# Patient Record
Sex: FEMALE | Race: WHITE | NOT HISPANIC OR LATINO | Employment: OTHER | URBAN - METROPOLITAN AREA
[De-identification: names, ages, dates, MRNs, and addresses within clinical notes are randomized per-mention and may not be internally consistent; named-entity substitution may affect disease eponyms.]

---

## 2018-10-11 DIAGNOSIS — Z12.11 SCREENING FOR COLON CANCER: Primary | ICD-10-CM

## 2018-10-23 ENCOUNTER — OUTSIDE FACILITY SERVICE (OUTPATIENT)
Dept: GASTROENTEROLOGY | Facility: CLINIC | Age: 53
End: 2018-10-23

## 2018-10-23 PROCEDURE — G0500 MOD SEDAT ENDO SERVICE >5YRS: HCPCS | Performed by: INTERNAL MEDICINE

## 2018-10-23 PROCEDURE — 43239 EGD BIOPSY SINGLE/MULTIPLE: CPT | Performed by: INTERNAL MEDICINE

## 2018-10-23 PROCEDURE — 45378 DIAGNOSTIC COLONOSCOPY: CPT | Performed by: INTERNAL MEDICINE

## 2021-08-25 ENCOUNTER — OFFICE VISIT (OUTPATIENT)
Dept: FAMILY MEDICINE CLINIC | Age: 56
End: 2021-08-25

## 2021-08-25 ENCOUNTER — LAB (OUTPATIENT)
Dept: LAB | Facility: HOSPITAL | Age: 56
End: 2021-08-25

## 2021-08-25 VITALS
SYSTOLIC BLOOD PRESSURE: 116 MMHG | BODY MASS INDEX: 39.39 KG/M2 | HEIGHT: 65 IN | DIASTOLIC BLOOD PRESSURE: 64 MMHG | WEIGHT: 236.4 LBS | HEART RATE: 84 BPM

## 2021-08-25 DIAGNOSIS — F90.9 ATTENTION DEFICIT HYPERACTIVITY DISORDER (ADHD), UNSPECIFIED ADHD TYPE: ICD-10-CM

## 2021-08-25 DIAGNOSIS — E11.9 TYPE 2 DIABETES MELLITUS WITHOUT COMPLICATION, WITHOUT LONG-TERM CURRENT USE OF INSULIN (HCC): ICD-10-CM

## 2021-08-25 DIAGNOSIS — F51.01 PRIMARY INSOMNIA: ICD-10-CM

## 2021-08-25 DIAGNOSIS — F43.10 PTSD (POST-TRAUMATIC STRESS DISORDER): ICD-10-CM

## 2021-08-25 DIAGNOSIS — G47.33 OSA (OBSTRUCTIVE SLEEP APNEA): Primary | ICD-10-CM

## 2021-08-25 DIAGNOSIS — L03.90 CELLULITIS, UNSPECIFIED CELLULITIS SITE: ICD-10-CM

## 2021-08-25 DIAGNOSIS — I10 ESSENTIAL HYPERTENSION: ICD-10-CM

## 2021-08-25 DIAGNOSIS — E78.5 HYPERLIPIDEMIA, UNSPECIFIED HYPERLIPIDEMIA TYPE: ICD-10-CM

## 2021-08-25 DIAGNOSIS — F41.9 ANXIETY: ICD-10-CM

## 2021-08-25 LAB
ALBUMIN SERPL-MCNC: 4.8 G/DL (ref 3.5–5.2)
ALBUMIN/GLOB SERPL: 1.5 G/DL
ALP SERPL-CCNC: 87 U/L (ref 39–117)
ALT SERPL W P-5'-P-CCNC: 53 U/L (ref 1–33)
ANION GAP SERPL CALCULATED.3IONS-SCNC: 11.6 MMOL/L (ref 5–15)
AST SERPL-CCNC: 48 U/L (ref 1–32)
BASOPHILS # BLD AUTO: 0.12 10*3/MM3 (ref 0–0.2)
BASOPHILS NFR BLD AUTO: 1.2 % (ref 0–1.5)
BILIRUB SERPL-MCNC: <0.2 MG/DL (ref 0–1.2)
BUN SERPL-MCNC: 24 MG/DL (ref 6–20)
BUN/CREAT SERPL: 22.2 (ref 7–25)
CALCIUM SPEC-SCNC: 9.5 MG/DL (ref 8.6–10.5)
CHLORIDE SERPL-SCNC: 98 MMOL/L (ref 98–107)
CHOLEST SERPL-MCNC: 130 MG/DL (ref 0–200)
CO2 SERPL-SCNC: 26.4 MMOL/L (ref 22–29)
CREAT SERPL-MCNC: 1.08 MG/DL (ref 0.57–1)
DEPRECATED RDW RBC AUTO: 41.3 FL (ref 37–54)
EOSINOPHIL # BLD AUTO: 0.24 10*3/MM3 (ref 0–0.4)
EOSINOPHIL NFR BLD AUTO: 2.4 % (ref 0.3–6.2)
ERYTHROCYTE [DISTWIDTH] IN BLOOD BY AUTOMATED COUNT: 14.2 % (ref 12.3–15.4)
GFR SERPL CREATININE-BSD FRML MDRD: 53 ML/MIN/1.73
GLOBULIN UR ELPH-MCNC: 3.2 GM/DL
GLUCOSE SERPL-MCNC: 105 MG/DL (ref 65–99)
HBA1C MFR BLD: 6.2 % (ref 4.8–5.6)
HCT VFR BLD AUTO: 43 % (ref 34–46.6)
HDLC SERPL-MCNC: 37 MG/DL (ref 40–60)
HGB BLD-MCNC: 13.7 G/DL (ref 12–15.9)
IMM GRANULOCYTES # BLD AUTO: 0.11 10*3/MM3 (ref 0–0.05)
IMM GRANULOCYTES NFR BLD AUTO: 1.1 % (ref 0–0.5)
LDLC SERPL CALC-MCNC: 46 MG/DL (ref 0–100)
LDLC/HDLC SERPL: 0.83 {RATIO}
LYMPHOCYTES # BLD AUTO: 2.91 10*3/MM3 (ref 0.7–3.1)
LYMPHOCYTES NFR BLD AUTO: 29.2 % (ref 19.6–45.3)
MCH RBC QN AUTO: 25.9 PG (ref 26.6–33)
MCHC RBC AUTO-ENTMCNC: 31.9 G/DL (ref 31.5–35.7)
MCV RBC AUTO: 81.3 FL (ref 79–97)
MONOCYTES # BLD AUTO: 0.47 10*3/MM3 (ref 0.1–0.9)
MONOCYTES NFR BLD AUTO: 4.7 % (ref 5–12)
NEUTROPHILS NFR BLD AUTO: 6.12 10*3/MM3 (ref 1.7–7)
NEUTROPHILS NFR BLD AUTO: 61.4 % (ref 42.7–76)
NRBC BLD AUTO-RTO: 0 /100 WBC (ref 0–0.2)
PLATELET # BLD AUTO: 322 10*3/MM3 (ref 140–450)
PMV BLD AUTO: 12.5 FL (ref 6–12)
POTASSIUM SERPL-SCNC: 4.4 MMOL/L (ref 3.5–5.2)
PROT SERPL-MCNC: 8 G/DL (ref 6–8.5)
RBC # BLD AUTO: 5.29 10*6/MM3 (ref 3.77–5.28)
SODIUM SERPL-SCNC: 136 MMOL/L (ref 136–145)
TRIGL SERPL-MCNC: 311 MG/DL (ref 0–150)
TSH SERPL DL<=0.05 MIU/L-ACNC: 1.6 UIU/ML (ref 0.27–4.2)
VLDLC SERPL-MCNC: 47 MG/DL (ref 5–40)
WBC # BLD AUTO: 9.97 10*3/MM3 (ref 3.4–10.8)

## 2021-08-25 PROCEDURE — 99204 OFFICE O/P NEW MOD 45 MIN: CPT | Performed by: FAMILY MEDICINE

## 2021-08-25 PROCEDURE — 80061 LIPID PANEL: CPT

## 2021-08-25 PROCEDURE — 84443 ASSAY THYROID STIM HORMONE: CPT

## 2021-08-25 PROCEDURE — 83036 HEMOGLOBIN GLYCOSYLATED A1C: CPT

## 2021-08-25 PROCEDURE — 85025 COMPLETE CBC W/AUTO DIFF WBC: CPT

## 2021-08-25 PROCEDURE — 80053 COMPREHEN METABOLIC PANEL: CPT

## 2021-08-25 PROCEDURE — 36415 COLL VENOUS BLD VENIPUNCTURE: CPT

## 2021-08-25 RX ORDER — LEVOCETIRIZINE DIHYDROCHLORIDE 5 MG/1
5 TABLET, FILM COATED ORAL DAILY
COMMUNITY
Start: 2021-06-01 | End: 2021-09-07 | Stop reason: SDUPTHER

## 2021-08-25 RX ORDER — FENOFIBRATE 134 MG/1
134 CAPSULE ORAL DAILY
COMMUNITY
Start: 2021-06-01 | End: 2021-09-07 | Stop reason: SDUPTHER

## 2021-08-25 RX ORDER — CEPHALEXIN 500 MG/1
CAPSULE ORAL
Status: ON HOLD | COMMUNITY
Start: 2021-08-24 | End: 2021-10-13

## 2021-08-25 RX ORDER — FUROSEMIDE 40 MG/1
40 TABLET ORAL DAILY
COMMUNITY
Start: 2021-05-24 | End: 2021-09-07 | Stop reason: SDUPTHER

## 2021-08-25 RX ORDER — PANTOPRAZOLE SODIUM 40 MG/1
40 TABLET, DELAYED RELEASE ORAL DAILY
COMMUNITY
Start: 2021-05-25 | End: 2021-09-07 | Stop reason: SDUPTHER

## 2021-08-25 RX ORDER — TEMAZEPAM 30 MG/1
1 CAPSULE ORAL NIGHTLY
COMMUNITY
Start: 2021-08-24

## 2021-08-25 RX ORDER — ONDANSETRON 4 MG/1
4 TABLET, FILM COATED ORAL EVERY 8 HOURS PRN
COMMUNITY
Start: 2021-08-24 | End: 2021-10-22

## 2021-08-25 RX ORDER — DEXTROAMPHETAMINE SACCHARATE, AMPHETAMINE ASPARTATE, DEXTROAMPHETAMINE SULFATE AND AMPHETAMINE SULFATE 7.5; 7.5; 7.5; 7.5 MG/1; MG/1; MG/1; MG/1
1 TABLET ORAL 2 TIMES DAILY
COMMUNITY
Start: 2021-08-23

## 2021-08-25 RX ORDER — GABAPENTIN 600 MG/1
1 TABLET ORAL 3 TIMES DAILY
COMMUNITY
Start: 2021-08-23

## 2021-08-25 RX ORDER — POTASSIUM CHLORIDE 750 MG/1
10 CAPSULE, EXTENDED RELEASE ORAL DAILY
COMMUNITY
Start: 2021-05-24 | End: 2021-08-25 | Stop reason: SDUPTHER

## 2021-08-25 RX ORDER — VILAZODONE HYDROCHLORIDE 40 MG/1
1 TABLET ORAL DAILY
COMMUNITY
Start: 2021-08-23 | End: 2022-06-28 | Stop reason: HOSPADM

## 2021-08-25 RX ORDER — SULFAMETHOXAZOLE AND TRIMETHOPRIM 800; 160 MG/1; MG/1
TABLET ORAL 2 TIMES DAILY
Status: ON HOLD | COMMUNITY
Start: 2021-08-24 | End: 2021-10-13

## 2021-08-25 RX ORDER — TRAZODONE HYDROCHLORIDE 50 MG/1
1 TABLET ORAL NIGHTLY PRN
COMMUNITY
Start: 2021-08-23

## 2021-08-25 RX ORDER — CLONAZEPAM 1 MG/1
1 TABLET ORAL 3 TIMES DAILY
COMMUNITY
Start: 2021-08-24

## 2021-08-25 RX ORDER — VALACYCLOVIR HYDROCHLORIDE 1 G/1
TABLET, FILM COATED ORAL
Status: ON HOLD | COMMUNITY
Start: 2021-07-26 | End: 2021-10-13

## 2021-08-25 RX ORDER — POTASSIUM CHLORIDE 750 MG/1
10 CAPSULE, EXTENDED RELEASE ORAL DAILY
Qty: 90 CAPSULE | Refills: 1 | Status: SHIPPED | OUTPATIENT
Start: 2021-08-25 | End: 2022-02-17 | Stop reason: SDUPTHER

## 2021-09-07 RX ORDER — PANTOPRAZOLE SODIUM 40 MG/1
40 TABLET, DELAYED RELEASE ORAL DAILY
Qty: 90 TABLET | Refills: 1 | Status: SHIPPED | OUTPATIENT
Start: 2021-09-07 | End: 2022-02-17 | Stop reason: SDUPTHER

## 2021-09-07 RX ORDER — LEVOCETIRIZINE DIHYDROCHLORIDE 5 MG/1
2.5 TABLET, FILM COATED ORAL DAILY
Qty: 90 TABLET | Refills: 1 | Status: SHIPPED | OUTPATIENT
Start: 2021-09-07 | End: 2022-02-17 | Stop reason: SDUPTHER

## 2021-09-07 RX ORDER — FUROSEMIDE 40 MG/1
40 TABLET ORAL DAILY
Qty: 90 TABLET | Refills: 0 | Status: SHIPPED | OUTPATIENT
Start: 2021-09-07 | End: 2021-12-09

## 2021-09-07 RX ORDER — FENOFIBRATE 134 MG/1
134 CAPSULE ORAL DAILY
Qty: 90 CAPSULE | Refills: 1 | Status: SHIPPED | OUTPATIENT
Start: 2021-09-07 | End: 2022-02-17 | Stop reason: SDUPTHER

## 2021-09-07 NOTE — TELEPHONE ENCOUNTER
Caller: Bc Travisselena Giraldo    Relationship: Self    Best call back number: [phone]     Medication needed:   Requested Prescriptions     Pending Prescriptions Disp Refills   • furosemide (LASIX) 40 MG tablet       Sig: Take 1 tablet by mouth Daily.   • metoprolol tartrate (LOPRESSOR) 25 MG tablet       Sig: Take 1 tablet by mouth 2 (Two) Times a Day.   • pantoprazole (PROTONIX) 40 MG EC tablet       Sig: Take 1 tablet by mouth Daily.   • fenofibrate micronized (LOFIBRA) 134 MG capsule       Sig: Take 1 capsule by mouth Daily.   • levocetirizine (XYZAL) 5 MG tablet       Sig: Take 1 tablet by mouth Daily.       When do you need the refill by: ASAP    What additional details did the patient provide when requesting the medication: PATIENT STATES SHE IS COMPLETELY OUT OF THESE MEDICATIONS    Does the patient have less than a 3 day supply:  [x] Yes  [] No    What is the patient's preferred pharmacy: [id number] - [place], [place] - [address] AT [facility] [address] [facility] - [phone] [facility] [phone] FX

## 2021-09-13 ENCOUNTER — TELEPHONE (OUTPATIENT)
Dept: FAMILY MEDICINE CLINIC | Age: 56
End: 2021-09-13

## 2021-09-13 NOTE — TELEPHONE ENCOUNTER
Caller: Travis Yancey    Relationship: Self    Best call back number: [phone]    What is the best time to reach you: ANYTIME    Who are you requesting to speak with (clinical staff, provider,  specific staff member): NURSE/DR. JOY     What was the call regarding: PATIENT IS WANTING TO MAKE SURE THAT DR. JOY HAS ORDERED A REFERRAL FOR THE SLEEP STUDY THAT SHE NEEDS DONE.     Do you require a callback: YES            Warm/Dry

## 2021-09-21 ENCOUNTER — TELEPHONE (OUTPATIENT)
Dept: FAMILY MEDICINE CLINIC | Age: 56
End: 2021-09-21

## 2021-09-21 NOTE — TELEPHONE ENCOUNTER
HUB TO READ    Pt wants to switch care to dr. Harmon her and her dad. She states that her sister is a pt with him and wants to transfer care from [facility] to [facility].

## 2021-09-21 NOTE — TELEPHONE ENCOUNTER
I would be willing to see the patient.  However, she should know that I do not routinely prescribe medications like clonazepam or temazepam.  If she is receiving those from this practice she would likely be referred to psychiatry to assess their ongoing needed use.  Thanks.

## 2021-09-30 ENCOUNTER — TELEPHONE (OUTPATIENT)
Dept: FAMILY MEDICINE CLINIC | Age: 56
End: 2021-09-30

## 2021-09-30 NOTE — TELEPHONE ENCOUNTER
Caller: Travis Yancey    Relationship to patient: Self    Best call back number: [phone] (H)    Patient is needing: PATIENT RETURNING OFFICE PHONE CALL

## 2021-10-05 ENCOUNTER — OFFICE VISIT (OUTPATIENT)
Dept: FAMILY MEDICINE CLINIC | Age: 56
End: 2021-10-05

## 2021-10-05 VITALS — DIASTOLIC BLOOD PRESSURE: 84 MMHG | OXYGEN SATURATION: 98 % | SYSTOLIC BLOOD PRESSURE: 126 MMHG | HEART RATE: 122 BPM

## 2021-10-05 DIAGNOSIS — R07.9 CHEST PAIN, UNSPECIFIED TYPE: Primary | ICD-10-CM

## 2021-10-05 PROCEDURE — 99214 OFFICE O/P EST MOD 30 MIN: CPT | Performed by: FAMILY MEDICINE

## 2021-10-05 NOTE — PROGRESS NOTES
Travis Yancey presents to [facility].    Chief Complaint:  Chest pain    Subjective       History of Present Illness:  Travis presented to the office with acute chest pain.  She says that she has been having some shortness of breath at night for the last bit.  She was scheduled for Medicare wellness exam, and I have not seen the patient previously.  She does give some history of cardiac disease indicating that she has narrow coronary arteries.  At any rate, on presentation to the office, she said she was having chest pain with radiating pain to the neck and left arm.  She was short of breath.  She described the pain as being a pressure like someone was sitting on her chest.      Review of Systems:  Review of Systems   Constitutional: Negative for chills and fever.   HENT: Negative for congestion and sore throat.    Respiratory: Positive for chest tightness and shortness of breath. Negative for cough.    Cardiovascular: Positive for chest pain. Negative for palpitations.   Gastrointestinal: Negative for abdominal pain, nausea and vomiting.   Musculoskeletal: Positive for neck pain.        Objective   Medical History:  Past Medical History:   • ADHD (attention deficit hyperactivity disorder)   • Anxiety   • Depression   • Diabetes mellitus (CMS/HCC)   • Hypertension     Past Surgical History:   • APPENDECTOMY   • CERVICAL FUSION    c5-6   •  SECTION   • CHOLECYSTECTOMY   • DISCECTOMY POSTERIOR THORACIC TRANSPEDICULAR APPROACH   • PARTIAL HIP ARTHROPLASTY      No family history on file.  Social History     Tobacco Use   • Smoking status: Former Smoker     Types: Cigarettes     Quit date: 2019     Years since quittin.1   • Smokeless tobacco: Never Used   • Tobacco comment: Intermittent from age 22 on-- 2-3 pack/ week   Substance Use Topics   • Alcohol use: Not Currently       Health Maintenance Due   Topic Date Due   • URINE MICROALBUMIN  Never done   • Pneumococcal  Vaccine 0-64 (1 of 2 - PPSV23) Never done   • Hepatitis B (1 of 3 - Risk 3-dose series) Never done   • TDAP/TD VACCINES (1 - Tdap) Never done   • ZOSTER VACCINE (1 of 2) Never done   • HEPATITIS C SCREENING  Never done   • ANNUAL WELLNESS VISIT  Never done   • DIABETIC FOOT EXAM  Never done   • PAP SMEAR  Never done   • DIABETIC EYE EXAM  Never done   • MAMMOGRAM  04/09/2020   • INFLUENZA VACCINE  09/01/2021        Immunization History   Administered Date(s) Administered   • COVID-19 (OOTU) 06/15/2021   • Influenza, Unspecified 11/18/2020       No Known Allergies     Medications:  Current Outpatient Medications on File Prior to Visit   Medication Sig   • amphetamine-dextroamphetamine (ADDERALL) 30 MG tablet Take 1 tablet by mouth 2 (two) times a day.   • cephalexin (KEFLEX) 500 MG capsule    • clonazePAM (KlonoPIN) 1 MG tablet Take 1 tablet by mouth 3 (Three) Times a Day.   • fenofibrate micronized (LOFIBRA) 134 MG capsule Take 1 capsule by mouth Daily.   • furosemide (LASIX) 40 MG tablet Take 1 tablet by mouth Daily.   • gabapentin (NEURONTIN) 600 MG tablet Take 1 tablet by mouth 3 (Three) Times a Day.   • levocetirizine (XYZAL) 5 MG tablet Take 0.5 tablets by mouth Daily.   • metFORMIN (GLUCOPHAGE) 500 MG tablet Take 2 tablets by mouth 2 (two) times a day.   • metoprolol tartrate (LOPRESSOR) 25 MG tablet Take 1 tablet by mouth 2 (Two) Times a Day.   • ondansetron (ZOFRAN) 4 MG tablet    • pantoprazole (PROTONIX) 40 MG EC tablet Take 1 tablet by mouth Daily.   • potassium chloride (MICRO-K) 10 MEQ CR capsule Take 1 capsule by mouth Daily.   • sulfamethoxazole-trimethoprim (BACTRIM DS,SEPTRA DS) 800-160 MG per tablet 2 (two) times a day.   • temazepam (RESTORIL) 30 MG capsule Take 1 capsule by mouth Every Night.   • traZODone (DESYREL) 50 MG tablet Take 1 tablet by mouth At Night As Needed for Sleep.   • valACYclovir (VALTREX) 1000 MG tablet TAKE 1 TABLET BY MOUTH THREE TIMES DAILY FOR 5 DAYS   • Viibryd 40 MG  tablet tablet Take 1 tablet by mouth Daily.     No current facility-administered medications on file prior to visit.       Vital Signs:   /84 (BP Location: Right arm, Patient Position: Sitting)   Pulse (!) 122   SpO2 98% Comment: RA      Physical Exam:  Physical Exam  Constitutional:       Appearance: She is obese. She is not toxic-appearing.   HENT:      Head: Normocephalic and atraumatic.   Eyes:      Extraocular Movements: Extraocular movements intact.      Pupils: Pupils are equal, round, and reactive to light.   Cardiovascular:      Rate and Rhythm: Regular rhythm. Tachycardia present.      Heart sounds: No murmur heard.     Pulmonary:      Effort: Pulmonary effort is normal. No respiratory distress.      Breath sounds: Normal breath sounds.   Abdominal:      Palpations: There is no mass.   Musculoskeletal:      Cervical back: Neck supple.   Lymphadenopathy:      Cervical: No cervical adenopathy.   Psychiatric:      Comments: anxious         Result Review      The following data was reviewed by Jeferson Harmon MD on 10/05/2021.  Lab Results   Component Value Date    WBC 9.97 08/25/2021    HGB 13.7 08/25/2021    HCT 43.0 08/25/2021    MCV 81.3 08/25/2021     08/25/2021     Lab Results   Component Value Date    GLUCOSE 105 (H) 08/25/2021    BUN 24 (H) 08/25/2021    CREATININE 1.08 (H) 08/25/2021    EGFRIFNONA 53 (L) 08/25/2021    BCR 22.2 08/25/2021    K 4.4 08/25/2021    CO2 26.4 08/25/2021    CALCIUM 9.5 08/25/2021    ALBUMIN 4.80 08/25/2021    AST 48 (H) 08/25/2021    ALT 53 (H) 08/25/2021     Lab Results   Component Value Date    CHOL 130 08/25/2021    TRIG 311 (H) 08/25/2021    HDL 37 (L) 08/25/2021    LDL 46 08/25/2021     Lab Results   Component Value Date    TSH 1.600 08/25/2021     Lab Results   Component Value Date    HGBA1C 6.20 (H) 08/25/2021            Assessment and Plan:   As above, Travis presented with acute complaint.  We did not move forward with Medicare wellness exam.   After assessing her briefly, we contacted EMS to come.  She was given supplemental oxygen at 2 L/min and chewed 324 mg of aspirin.  It was my intention to have her moved to the examination bed, but she became lightheaded upon standing.  Therefore, we did not give aspirin.  She left the office awake and alert and without obvious distress.  History was given to EMS, and I spoke to the staff at [facility].  Nursing also informed Travis's father of the transfer to the emergency room.       There are no diagnoses linked to this encounter.      Follow Up   No follow-ups on file.  Patient was given instructions and counseling regarding her condition or for health maintenance advice. Please see specific information pulled into the AVS if appropriate.

## 2021-10-06 ENCOUNTER — READMISSION MANAGEMENT (OUTPATIENT)
Dept: CALL CENTER | Facility: HOSPITAL | Age: 56
End: 2021-10-06

## 2021-10-06 ENCOUNTER — TELEPHONE (OUTPATIENT)
Dept: FAMILY MEDICINE CLINIC | Age: 56
End: 2021-10-06

## 2021-10-06 NOTE — TELEPHONE ENCOUNTER
Caller: Travis Yancey    Relationship to patient: Self    Best call back number: [phone]    Chief complaint: CHEST PAIN    Type of visit: HOSPITAL FOLLOW UP    Requested date: WITHIN 7 DAYS    Additional notes: PATIENT IS GETTING DISCHARGED FROM [facility] 10/6/21 FOR CHEST PAIN. THERE IS NO AVAILABILITY WITH PCP WITHIN 7 DAYS PER HUB PROTOCOL. ATTEMPTED TO WARM TRANSFER. PLEASE CALL PATIENT TO SCHEDULE.

## 2021-10-07 ENCOUNTER — TRANSITIONAL CARE MANAGEMENT TELEPHONE ENCOUNTER (OUTPATIENT)
Dept: CALL CENTER | Facility: HOSPITAL | Age: 56
End: 2021-10-07

## 2021-10-07 NOTE — OUTREACH NOTE
Prep Survey      Responses   [facility] facility patient discharged from?  Non-BH   Is LACE score < 7 ?  Non-BH Discharge   Emergency Room discharge w/ pulse ox?  No   Eligibility  [facility]   Date of Admission  10/05/21   Date of Discharge  10/06/21   Discharge diagnosis  Acute Chest Pain, EMS called from PCP office   Does the patient have one of the following disease processes/diagnoses(primary or secondary)?  Non-BH Discharge   Prep survey completed?  Yes          Siomara Stewart RN

## 2021-10-07 NOTE — OUTREACH NOTE
Call Center TCM Note      Responses   [facility] patient discharged from?  Non-   Does the patient have one of the following disease processes/diagnoses(primary or secondary)?  Non- Discharge   TCM attempt successful?  Yes   Call start time  1157   Call end time  1203   Discharge diagnosis  Acute Chest Pain, EMS called from PCP office   Does the patient have all medications ordered at discharge?  Yes   Is the patient taking all medications as directed (includes completed medication regime)?  Yes   Does the patient have a primary care provider?   Yes   Does the patient have an appointment with their PCP within 7 days of discharge?  Greater than 7 days   Comments regarding PCP  appt with Dr. Harmon on 10/15   Nursing Interventions  Verified appointment date/time/provider   Has the patient kept scheduled appointments due by today?  N/A   Psychosocial issues?  No   Did the patient receive a copy of their discharge instructions?  Yes   What is the patient's perception of their health status since discharge?  Same   Is the patient/caregiver able to teach back the hierarchy of who to call/visit for symptoms/problems? PCP, Specialist, Home health nurse, Urgent Care, ED, 911  Yes   TCM call completed?  Yes   Wrap up additional comments  Says she is still having the chest pains, advised her if pains get worse to seek care at a different hospital because she states she will not go back to [facility], confirmed appt with Dr. Harmon for 10/15.          Magali Andujar RN    10/7/2021, 12:03 EDT

## 2021-10-13 ENCOUNTER — APPOINTMENT (OUTPATIENT)
Dept: GENERAL RADIOLOGY | Facility: HOSPITAL | Age: 56
End: 2021-10-13

## 2021-10-13 ENCOUNTER — APPOINTMENT (OUTPATIENT)
Dept: CARDIOLOGY | Facility: HOSPITAL | Age: 56
End: 2021-10-13

## 2021-10-13 ENCOUNTER — HOSPITAL ENCOUNTER (OUTPATIENT)
Facility: HOSPITAL | Age: 56
LOS: 1 days | Discharge: HOME | End: 2021-10-17
Attending: EMERGENCY MEDICINE | Admitting: STUDENT IN AN ORGANIZED HEALTH CARE EDUCATION/TRAINING PROGRAM

## 2021-10-13 DIAGNOSIS — R07.2 PRECORDIAL CHEST PAIN: Primary | ICD-10-CM

## 2021-10-13 DIAGNOSIS — E66.2 OBESITY HYPOVENTILATION SYNDROME (HCC): ICD-10-CM

## 2021-10-13 DIAGNOSIS — R09.02 HYPOXIA: ICD-10-CM

## 2021-10-13 DIAGNOSIS — R06.09 DYSPNEA ON EXERTION: ICD-10-CM

## 2021-10-13 DIAGNOSIS — J96.01 ACUTE RESPIRATORY FAILURE WITH HYPOXIA (HCC): ICD-10-CM

## 2021-10-13 DIAGNOSIS — M75.42 SHOULDER IMPINGEMENT SYNDROME, LEFT: ICD-10-CM

## 2021-10-13 DIAGNOSIS — G47.33 OSA (OBSTRUCTIVE SLEEP APNEA): ICD-10-CM

## 2021-10-13 PROBLEM — J42 CHRONIC BRONCHITIS: Status: ACTIVE | Noted: 2021-10-13

## 2021-10-13 PROBLEM — K21.9 ESOPHAGEAL REFLUX: Status: ACTIVE | Noted: 2021-10-13

## 2021-10-13 PROBLEM — I95.1 ORTHOSTATIC HYPOTENSION: Status: ACTIVE | Noted: 2017-01-24

## 2021-10-13 PROBLEM — E78.5 HYPERLIPIDEMIA: Status: ACTIVE | Noted: 2020-08-05

## 2021-10-13 PROBLEM — M79.7 FIBROMYALGIA: Status: ACTIVE | Noted: 2021-10-13

## 2021-10-13 PROBLEM — R15.9 BOWEL AND BLADDER INCONTINENCE: Status: ACTIVE | Noted: 2017-01-24

## 2021-10-13 PROBLEM — G43.909 MIGRAINE: Status: ACTIVE | Noted: 2021-10-13

## 2021-10-13 PROBLEM — F32.A DEPRESSION: Status: ACTIVE | Noted: 2021-10-13

## 2021-10-13 PROBLEM — R32 BOWEL AND BLADDER INCONTINENCE: Status: ACTIVE | Noted: 2017-01-24

## 2021-10-13 PROBLEM — D64.9 ANEMIA: Status: ACTIVE | Noted: 2021-10-13

## 2021-10-13 PROBLEM — F31.9 BIPOLAR DISORDER: Status: ACTIVE | Noted: 2021-10-13

## 2021-10-13 PROBLEM — R07.9 CHEST PAIN: Status: ACTIVE | Noted: 2021-10-13

## 2021-10-13 LAB
ALBUMIN SERPL-MCNC: 4.7 G/DL (ref 3.5–5.2)
ALBUMIN/GLOB SERPL: 1.6 G/DL
ALP SERPL-CCNC: 61 U/L (ref 39–117)
ALT SERPL W P-5'-P-CCNC: 44 U/L (ref 1–33)
ANION GAP SERPL CALCULATED.3IONS-SCNC: 12.5 MMOL/L (ref 5–15)
AST SERPL-CCNC: 47 U/L (ref 1–32)
BASOPHILS # BLD AUTO: 0.11 10*3/MM3 (ref 0–0.2)
BASOPHILS NFR BLD AUTO: 1.2 % (ref 0–1.5)
BH CV ECHO MEAS - ACS: 2.1 CM
BH CV ECHO MEAS - AO MAX PG (FULL): 2.7 MMHG
BH CV ECHO MEAS - AO MAX PG: 8 MMHG
BH CV ECHO MEAS - AO MEAN PG (FULL): 2 MMHG
BH CV ECHO MEAS - AO MEAN PG: 5 MMHG
BH CV ECHO MEAS - AO ROOT AREA (BSA CORRECTED): 1.2
BH CV ECHO MEAS - AO ROOT AREA: 4.9 CM^2
BH CV ECHO MEAS - AO ROOT DIAM: 2.5 CM
BH CV ECHO MEAS - AO V2 MAX: 141 CM/SEC
BH CV ECHO MEAS - AO V2 MEAN: 100 CM/SEC
BH CV ECHO MEAS - AO V2 VTI: 21 CM
BH CV ECHO MEAS - AVA(I,A): 2.7 CM^2
BH CV ECHO MEAS - AVA(I,D): 2.7 CM^2
BH CV ECHO MEAS - AVA(V,A): 2.3 CM^2
BH CV ECHO MEAS - AVA(V,D): 2.3 CM^2
BH CV ECHO MEAS - BSA(HAYCOCK): 2.3 M^2
BH CV ECHO MEAS - BSA: 2.1 M^2
BH CV ECHO MEAS - BZI_BMI: 39.9 KILOGRAMS/M^2
BH CV ECHO MEAS - BZI_METRIC_HEIGHT: 165.1 CM
BH CV ECHO MEAS - BZI_METRIC_WEIGHT: 108.9 KG
BH CV ECHO MEAS - EDV(CUBED): 64 ML
BH CV ECHO MEAS - EDV(MOD-SP2): 44 ML
BH CV ECHO MEAS - EDV(MOD-SP4): 43 ML
BH CV ECHO MEAS - EDV(TEICH): 70 ML
BH CV ECHO MEAS - EF(CUBED): 57.8 %
BH CV ECHO MEAS - EF(MOD-BP): 60.2 %
BH CV ECHO MEAS - EF(MOD-SP2): 61.4 %
BH CV ECHO MEAS - EF(MOD-SP4): 53.5 %
BH CV ECHO MEAS - EF(TEICH): 50 %
BH CV ECHO MEAS - ESV(CUBED): 27 ML
BH CV ECHO MEAS - ESV(MOD-SP2): 17 ML
BH CV ECHO MEAS - ESV(MOD-SP4): 20 ML
BH CV ECHO MEAS - ESV(TEICH): 35 ML
BH CV ECHO MEAS - FS: 25 %
BH CV ECHO MEAS - IVS/LVPW: 1.2
BH CV ECHO MEAS - IVSD: 1.3 CM
BH CV ECHO MEAS - LA DIMENSION: 3 CM
BH CV ECHO MEAS - LA/AO: 1.2
BH CV ECHO MEAS - LV DIASTOLIC VOL/BSA (35-75): 20.1 ML/M^2
BH CV ECHO MEAS - LV MASS(C)D: 165.5 GRAMS
BH CV ECHO MEAS - LV MASS(C)DI: 77.4 GRAMS/M^2
BH CV ECHO MEAS - LV MAX PG: 5.3 MMHG
BH CV ECHO MEAS - LV MEAN PG: 3 MMHG
BH CV ECHO MEAS - LV SYSTOLIC VOL/BSA (12-30): 9.4 ML/M^2
BH CV ECHO MEAS - LV V1 MAX: 115 CM/SEC
BH CV ECHO MEAS - LV V1 MEAN: 83.8 CM/SEC
BH CV ECHO MEAS - LV V1 VTI: 19.7 CM
BH CV ECHO MEAS - LVIDD: 4 CM
BH CV ECHO MEAS - LVIDS: 3 CM
BH CV ECHO MEAS - LVLD AP2: 6.9 CM
BH CV ECHO MEAS - LVLD AP4: 7.7 CM
BH CV ECHO MEAS - LVLS AP2: 6.3 CM
BH CV ECHO MEAS - LVLS AP4: 6.3 CM
BH CV ECHO MEAS - LVOT AREA (M): 2.8 CM^2
BH CV ECHO MEAS - LVOT AREA: 2.8 CM^2
BH CV ECHO MEAS - LVOT DIAM: 1.9 CM
BH CV ECHO MEAS - LVPWD: 1.1 CM
BH CV ECHO MEAS - MED PEAK E' VEL: 8.2 CM/SEC
BH CV ECHO MEAS - MV A MAX VEL: 114 CM/SEC
BH CV ECHO MEAS - MV DEC SLOPE: 670 CM/SEC^2
BH CV ECHO MEAS - MV DEC TIME: 0.19 SEC
BH CV ECHO MEAS - MV E MAX VEL: 66.8 CM/SEC
BH CV ECHO MEAS - MV E/A: 0.59
BH CV ECHO MEAS - MV MAX PG: 5.1 MMHG
BH CV ECHO MEAS - MV MEAN PG: 2 MMHG
BH CV ECHO MEAS - MV P1/2T MAX VEL: 111 CM/SEC
BH CV ECHO MEAS - MV P1/2T: 48.5 MSEC
BH CV ECHO MEAS - MV V2 MAX: 113 CM/SEC
BH CV ECHO MEAS - MV V2 MEAN: 69.8 CM/SEC
BH CV ECHO MEAS - MV V2 VTI: 20.9 CM
BH CV ECHO MEAS - MVA P1/2T LCG: 2 CM^2
BH CV ECHO MEAS - MVA(P1/2T): 4.5 CM^2
BH CV ECHO MEAS - MVA(VTI): 2.7 CM^2
BH CV ECHO MEAS - PA ACC TIME: 0.12 SEC
BH CV ECHO MEAS - PA MAX PG (FULL): 1.6 MMHG
BH CV ECHO MEAS - PA MAX PG: 4.2 MMHG
BH CV ECHO MEAS - PA PR(ACCEL): 23.2 MMHG
BH CV ECHO MEAS - PA V2 MAX: 103 CM/SEC
BH CV ECHO MEAS - PVA(V,A): 4.9 CM^2
BH CV ECHO MEAS - PVA(V,D): 4.9 CM^2
BH CV ECHO MEAS - QP/QS: 1.6
BH CV ECHO MEAS - RV MAX PG: 2.7 MMHG
BH CV ECHO MEAS - RV MEAN PG: 1 MMHG
BH CV ECHO MEAS - RV V1 MAX: 81.6 CM/SEC
BH CV ECHO MEAS - RV V1 MEAN: 51.9 CM/SEC
BH CV ECHO MEAS - RV V1 VTI: 14.5 CM
BH CV ECHO MEAS - RVOT AREA: 6.2 CM^2
BH CV ECHO MEAS - RVOT DIAM: 2.8 CM
BH CV ECHO MEAS - SI(AO): 48.2 ML/M^2
BH CV ECHO MEAS - SI(CUBED): 17.3 ML/M^2
BH CV ECHO MEAS - SI(LVOT): 26.1 ML/M^2
BH CV ECHO MEAS - SI(MOD-SP2): 12.6 ML/M^2
BH CV ECHO MEAS - SI(MOD-SP4): 10.8 ML/M^2
BH CV ECHO MEAS - SI(TEICH): 16.4 ML/M^2
BH CV ECHO MEAS - SV(AO): 103.1 ML
BH CV ECHO MEAS - SV(CUBED): 37 ML
BH CV ECHO MEAS - SV(LVOT): 55.9 ML
BH CV ECHO MEAS - SV(MOD-SP2): 27 ML
BH CV ECHO MEAS - SV(MOD-SP4): 23 ML
BH CV ECHO MEAS - SV(RVOT): 89.3 ML
BH CV ECHO MEAS - SV(TEICH): 35 ML
BILIRUB SERPL-MCNC: 0.2 MG/DL (ref 0–1.2)
BUN SERPL-MCNC: 26 MG/DL (ref 6–20)
BUN/CREAT SERPL: 22 (ref 7–25)
CALCIUM SPEC-SCNC: 9.5 MG/DL (ref 8.6–10.5)
CHLORIDE SERPL-SCNC: 102 MMOL/L (ref 98–107)
CO2 SERPL-SCNC: 25.5 MMOL/L (ref 22–29)
CREAT SERPL-MCNC: 1.18 MG/DL (ref 0.57–1)
CRP SERPL-MCNC: 1.16 MG/DL (ref 0–0.5)
D DIMER PPP FEU-MCNC: <0.27 MCGFEU/ML (ref 0–0.49)
DEPRECATED RDW RBC AUTO: 47.1 FL (ref 37–54)
EOSINOPHIL # BLD AUTO: 0.21 10*3/MM3 (ref 0–0.4)
EOSINOPHIL NFR BLD AUTO: 2.2 % (ref 0.3–6.2)
ERYTHROCYTE [DISTWIDTH] IN BLOOD BY AUTOMATED COUNT: 15.4 % (ref 12.3–15.4)
ERYTHROCYTE [SEDIMENTATION RATE] IN BLOOD: 35 MM/HR (ref 0–30)
GFR SERPL CREATININE-BSD FRML MDRD: 48 ML/MIN/1.73
GLOBULIN UR ELPH-MCNC: 2.9 GM/DL
GLUCOSE BLDC GLUCOMTR-MCNC: 103 MG/DL (ref 70–130)
GLUCOSE BLDC GLUCOMTR-MCNC: 112 MG/DL (ref 70–130)
GLUCOSE SERPL-MCNC: 119 MG/DL (ref 65–99)
HCT VFR BLD AUTO: 37.1 % (ref 34–46.6)
HCT VFR BLDA CALC: 35 % (ref 38–51)
HCT VFR BLDA CALC: 36 % (ref 38–51)
HGB BLD-MCNC: 11.9 G/DL (ref 12–15.9)
HGB BLDA-MCNC: 11.9 G/DL (ref 12–17)
HGB BLDA-MCNC: 12.2 G/DL (ref 12–17)
IMM GRANULOCYTES # BLD AUTO: 0.14 10*3/MM3 (ref 0–0.05)
IMM GRANULOCYTES NFR BLD AUTO: 1.5 % (ref 0–0.5)
LV EF 2D ECHO EST: 60 %
LYMPHOCYTES # BLD AUTO: 2.33 10*3/MM3 (ref 0.7–3.1)
LYMPHOCYTES NFR BLD AUTO: 24.7 % (ref 19.6–45.3)
MCH RBC QN AUTO: 26.6 PG (ref 26.6–33)
MCHC RBC AUTO-ENTMCNC: 32.1 G/DL (ref 31.5–35.7)
MCV RBC AUTO: 82.8 FL (ref 79–97)
MONOCYTES # BLD AUTO: 0.53 10*3/MM3 (ref 0.1–0.9)
MONOCYTES NFR BLD AUTO: 5.6 % (ref 5–12)
NEUTROPHILS NFR BLD AUTO: 6.12 10*3/MM3 (ref 1.7–7)
NEUTROPHILS NFR BLD AUTO: 64.8 % (ref 42.7–76)
NRBC BLD AUTO-RTO: 0 /100 WBC (ref 0–0.2)
NT-PROBNP SERPL-MCNC: 32.4 PG/ML (ref 0–900)
PLATELET # BLD AUTO: 239 10*3/MM3 (ref 140–450)
PMV BLD AUTO: 12.2 FL (ref 6–12)
POTASSIUM SERPL-SCNC: 4.3 MMOL/L (ref 3.5–5.2)
PROT SERPL-MCNC: 7.6 G/DL (ref 6–8.5)
QT INTERVAL: 333 MS
QT INTERVAL: 356 MS
RBC # BLD AUTO: 4.48 10*6/MM3 (ref 3.77–5.28)
SAO2 % BLDA: 67 % (ref 95–98)
SAO2 % BLDA: 97 % (ref 95–98)
SARS-COV-2 RNA RESP QL NAA+PROBE: NOT DETECTED
SINUS: 2.5 CM
SODIUM SERPL-SCNC: 140 MMOL/L (ref 136–145)
STJ: 2.6 CM
TROPONIN T SERPL-MCNC: <0.01 NG/ML (ref 0–0.03)
WBC # BLD AUTO: 9.44 10*3/MM3 (ref 3.4–10.8)

## 2021-10-13 PROCEDURE — 93005 ELECTROCARDIOGRAM TRACING: CPT | Performed by: NURSE PRACTITIONER

## 2021-10-13 PROCEDURE — 25010000002 HEPARIN (PORCINE) PER 1000 UNITS: Performed by: INTERNAL MEDICINE

## 2021-10-13 PROCEDURE — 93005 ELECTROCARDIOGRAM TRACING: CPT

## 2021-10-13 PROCEDURE — 25010000002 PERFLUTREN (DEFINITY) 8.476 MG IN SODIUM CHLORIDE (PF) 0.9 % 10 ML INJECTION: Performed by: NURSE PRACTITIONER

## 2021-10-13 PROCEDURE — 99284 EMERGENCY DEPT VISIT MOD MDM: CPT

## 2021-10-13 PROCEDURE — 85025 COMPLETE CBC W/AUTO DIFF WBC: CPT | Performed by: EMERGENCY MEDICINE

## 2021-10-13 PROCEDURE — 94660 CPAP INITIATION&MGMT: CPT

## 2021-10-13 PROCEDURE — 99204 OFFICE O/P NEW MOD 45 MIN: CPT | Performed by: INTERNAL MEDICINE

## 2021-10-13 PROCEDURE — 96374 THER/PROPH/DIAG INJ IV PUSH: CPT

## 2021-10-13 PROCEDURE — C1894 INTRO/SHEATH, NON-LASER: HCPCS | Performed by: INTERNAL MEDICINE

## 2021-10-13 PROCEDURE — 93460 R&L HRT ART/VENTRICLE ANGIO: CPT | Performed by: INTERNAL MEDICINE

## 2021-10-13 PROCEDURE — 0 IOPAMIDOL PER 1 ML: Performed by: INTERNAL MEDICINE

## 2021-10-13 PROCEDURE — 83880 ASSAY OF NATRIURETIC PEPTIDE: CPT | Performed by: EMERGENCY MEDICINE

## 2021-10-13 PROCEDURE — 25010000002 MORPHINE PER 10 MG: Performed by: NURSE PRACTITIONER

## 2021-10-13 PROCEDURE — U0003 INFECTIOUS AGENT DETECTION BY NUCLEIC ACID (DNA OR RNA); SEVERE ACUTE RESPIRATORY SYNDROME CORONAVIRUS 2 (SARS-COV-2) (CORONAVIRUS DISEASE [COVID-19]), AMPLIFIED PROBE TECHNIQUE, MAKING USE OF HIGH THROUGHPUT TECHNOLOGIES AS DESCRIBED BY CMS-2020-01-R: HCPCS | Performed by: EMERGENCY MEDICINE

## 2021-10-13 PROCEDURE — 71045 X-RAY EXAM CHEST 1 VIEW: CPT

## 2021-10-13 PROCEDURE — 96375 TX/PRO/DX INJ NEW DRUG ADDON: CPT

## 2021-10-13 PROCEDURE — C9803 HOPD COVID-19 SPEC COLLECT: HCPCS

## 2021-10-13 PROCEDURE — 93010 ELECTROCARDIOGRAM REPORT: CPT | Performed by: INTERNAL MEDICINE

## 2021-10-13 PROCEDURE — 25010000002 ONDANSETRON PER 1 MG: Performed by: EMERGENCY MEDICINE

## 2021-10-13 PROCEDURE — 93306 TTE W/DOPPLER COMPLETE: CPT | Performed by: INTERNAL MEDICINE

## 2021-10-13 PROCEDURE — 85379 FIBRIN DEGRADATION QUANT: CPT | Performed by: INTERNAL MEDICINE

## 2021-10-13 PROCEDURE — C1769 GUIDE WIRE: HCPCS | Performed by: INTERNAL MEDICINE

## 2021-10-13 PROCEDURE — 84484 ASSAY OF TROPONIN QUANT: CPT | Performed by: NURSE PRACTITIONER

## 2021-10-13 PROCEDURE — 25010000002 MORPHINE PER 10 MG: Performed by: EMERGENCY MEDICINE

## 2021-10-13 PROCEDURE — 96376 TX/PRO/DX INJ SAME DRUG ADON: CPT

## 2021-10-13 PROCEDURE — 25010000002 FENTANYL CITRATE (PF) 50 MCG/ML SOLUTION: Performed by: INTERNAL MEDICINE

## 2021-10-13 PROCEDURE — 85652 RBC SED RATE AUTOMATED: CPT | Performed by: INTERNAL MEDICINE

## 2021-10-13 PROCEDURE — 80053 COMPREHEN METABOLIC PANEL: CPT | Performed by: EMERGENCY MEDICINE

## 2021-10-13 PROCEDURE — 86140 C-REACTIVE PROTEIN: CPT | Performed by: INTERNAL MEDICINE

## 2021-10-13 PROCEDURE — 25010000002 MIDAZOLAM PER 1 MG: Performed by: INTERNAL MEDICINE

## 2021-10-13 PROCEDURE — 85014 HEMATOCRIT: CPT

## 2021-10-13 PROCEDURE — 25010000002 ONDANSETRON PER 1 MG: Performed by: NURSE PRACTITIONER

## 2021-10-13 PROCEDURE — 84484 ASSAY OF TROPONIN QUANT: CPT | Performed by: EMERGENCY MEDICINE

## 2021-10-13 PROCEDURE — 82962 GLUCOSE BLOOD TEST: CPT

## 2021-10-13 PROCEDURE — 94664 DEMO&/EVAL PT USE INHALER: CPT

## 2021-10-13 PROCEDURE — 85018 HEMOGLOBIN: CPT

## 2021-10-13 PROCEDURE — 94799 UNLISTED PULMONARY SVC/PX: CPT

## 2021-10-13 PROCEDURE — 93306 TTE W/DOPPLER COMPLETE: CPT

## 2021-10-13 RX ORDER — CLONAZEPAM 1 MG/1
1 TABLET ORAL 3 TIMES DAILY
Status: DISCONTINUED | OUTPATIENT
Start: 2021-10-13 | End: 2021-10-17 | Stop reason: HOSPADM

## 2021-10-13 RX ORDER — TRAZODONE HYDROCHLORIDE 50 MG/1
50 TABLET ORAL NIGHTLY PRN
Status: DISCONTINUED | OUTPATIENT
Start: 2021-10-13 | End: 2021-10-17 | Stop reason: HOSPADM

## 2021-10-13 RX ORDER — FENOFIBRATE 145 MG/1
145 TABLET, COATED ORAL DAILY
Status: DISCONTINUED | OUTPATIENT
Start: 2021-10-13 | End: 2021-10-14 | Stop reason: SDUPTHER

## 2021-10-13 RX ORDER — NICOTINE POLACRILEX 4 MG
15 LOZENGE BUCCAL
Status: DISCONTINUED | OUTPATIENT
Start: 2021-10-13 | End: 2021-10-17 | Stop reason: HOSPADM

## 2021-10-13 RX ORDER — SODIUM CHLORIDE 9 MG/ML
250 INJECTION, SOLUTION INTRAVENOUS ONCE AS NEEDED
Status: DISCONTINUED | OUTPATIENT
Start: 2021-10-13 | End: 2021-10-17 | Stop reason: HOSPADM

## 2021-10-13 RX ORDER — ONDANSETRON 4 MG/1
4 TABLET, FILM COATED ORAL EVERY 6 HOURS PRN
Status: ACTIVE | OUTPATIENT
Start: 2021-10-13 | End: 2021-10-15

## 2021-10-13 RX ORDER — ACETAMINOPHEN 160 MG/5ML
650 SOLUTION ORAL EVERY 4 HOURS PRN
Status: DISCONTINUED | OUTPATIENT
Start: 2021-10-13 | End: 2021-10-17 | Stop reason: HOSPADM

## 2021-10-13 RX ORDER — CALCIUM CARBONATE 200(500)MG
2 TABLET,CHEWABLE ORAL 2 TIMES DAILY PRN
Status: DISCONTINUED | OUTPATIENT
Start: 2021-10-13 | End: 2021-10-17 | Stop reason: HOSPADM

## 2021-10-13 RX ORDER — MORPHINE SULFATE 2 MG/ML
1 INJECTION, SOLUTION INTRAMUSCULAR; INTRAVENOUS EVERY 4 HOURS PRN
Status: DISCONTINUED | OUTPATIENT
Start: 2021-10-13 | End: 2021-10-17 | Stop reason: HOSPADM

## 2021-10-13 RX ORDER — MORPHINE SULFATE 2 MG/ML
4 INJECTION, SOLUTION INTRAMUSCULAR; INTRAVENOUS EVERY 4 HOURS PRN
Status: DISCONTINUED | OUTPATIENT
Start: 2021-10-13 | End: 2021-10-17 | Stop reason: HOSPADM

## 2021-10-13 RX ORDER — GABAPENTIN 300 MG/1
600 CAPSULE ORAL 3 TIMES DAILY
Status: DISCONTINUED | OUTPATIENT
Start: 2021-10-13 | End: 2021-10-13

## 2021-10-13 RX ORDER — LIDOCAINE HYDROCHLORIDE 20 MG/ML
INJECTION, SOLUTION INFILTRATION; PERINEURAL AS NEEDED
Status: DISCONTINUED | OUTPATIENT
Start: 2021-10-13 | End: 2021-10-13 | Stop reason: HOSPADM

## 2021-10-13 RX ORDER — SODIUM CHLORIDE 9 MG/ML
75 INJECTION, SOLUTION INTRAVENOUS CONTINUOUS
Status: DISCONTINUED | OUTPATIENT
Start: 2021-10-13 | End: 2021-10-13

## 2021-10-13 RX ORDER — POLYETHYLENE GLYCOL 3350 17 G/17G
17 POWDER, FOR SOLUTION ORAL DAILY PRN
Status: DISCONTINUED | OUTPATIENT
Start: 2021-10-13 | End: 2021-10-17 | Stop reason: HOSPADM

## 2021-10-13 RX ORDER — GABAPENTIN 300 MG/1
600 CAPSULE ORAL EVERY 8 HOURS SCHEDULED
Status: DISCONTINUED | OUTPATIENT
Start: 2021-10-14 | End: 2021-10-17 | Stop reason: HOSPADM

## 2021-10-13 RX ORDER — FAMOTIDINE 20 MG/1
20 TABLET, FILM COATED ORAL
Status: DISCONTINUED | OUTPATIENT
Start: 2021-10-14 | End: 2021-10-17

## 2021-10-13 RX ORDER — PANTOPRAZOLE SODIUM 40 MG/1
40 TABLET, DELAYED RELEASE ORAL DAILY
Status: DISCONTINUED | OUTPATIENT
Start: 2021-10-13 | End: 2021-10-13

## 2021-10-13 RX ORDER — ASPIRIN 325 MG
325 TABLET ORAL ONCE
Status: COMPLETED | OUTPATIENT
Start: 2021-10-13 | End: 2021-10-13

## 2021-10-13 RX ORDER — SODIUM CHLORIDE 0.9 % (FLUSH) 0.9 %
10 SYRINGE (ML) INJECTION EVERY 12 HOURS SCHEDULED
Status: DISCONTINUED | OUTPATIENT
Start: 2021-10-13 | End: 2021-10-17 | Stop reason: HOSPADM

## 2021-10-13 RX ORDER — TEMAZEPAM 15 MG/1
30 CAPSULE ORAL NIGHTLY
Status: DISCONTINUED | OUTPATIENT
Start: 2021-10-13 | End: 2021-10-17 | Stop reason: HOSPADM

## 2021-10-13 RX ORDER — NALOXONE HCL 0.4 MG/ML
0.4 VIAL (ML) INJECTION
Status: DISCONTINUED | OUTPATIENT
Start: 2021-10-13 | End: 2021-10-17 | Stop reason: HOSPADM

## 2021-10-13 RX ORDER — ATORVASTATIN CALCIUM 20 MG/1
20 TABLET, FILM COATED ORAL NIGHTLY
Status: DISCONTINUED | OUTPATIENT
Start: 2021-10-14 | End: 2021-10-14

## 2021-10-13 RX ORDER — OXYCODONE AND ACETAMINOPHEN 7.5; 325 MG/1; MG/1
1 TABLET ORAL ONCE AS NEEDED
Status: COMPLETED | OUTPATIENT
Start: 2021-10-13 | End: 2021-10-13

## 2021-10-13 RX ORDER — NITROGLYCERIN 0.4 MG/1
0.4 TABLET SUBLINGUAL
Status: DISCONTINUED | OUTPATIENT
Start: 2021-10-13 | End: 2021-10-17 | Stop reason: HOSPADM

## 2021-10-13 RX ORDER — FLUTICASONE PROPIONATE 50 MCG
2 SPRAY, SUSPENSION (ML) NASAL DAILY
Status: ON HOLD | COMMUNITY
End: 2021-10-14 | Stop reason: SDUPTHER

## 2021-10-13 RX ORDER — FENTANYL CITRATE 50 UG/ML
INJECTION, SOLUTION INTRAMUSCULAR; INTRAVENOUS AS NEEDED
Status: DISCONTINUED | OUTPATIENT
Start: 2021-10-13 | End: 2021-10-13 | Stop reason: HOSPADM

## 2021-10-13 RX ORDER — BISACODYL 5 MG/1
5 TABLET, DELAYED RELEASE ORAL DAILY PRN
Status: DISCONTINUED | OUTPATIENT
Start: 2021-10-13 | End: 2021-10-17 | Stop reason: HOSPADM

## 2021-10-13 RX ORDER — INSULIN LISPRO 100 [IU]/ML
0-9 INJECTION, SOLUTION INTRAVENOUS; SUBCUTANEOUS
Status: DISCONTINUED | OUTPATIENT
Start: 2021-10-14 | End: 2021-10-17 | Stop reason: HOSPADM

## 2021-10-13 RX ORDER — UREA 10 %
1 LOTION (ML) TOPICAL NIGHTLY PRN
Status: DISCONTINUED | OUTPATIENT
Start: 2021-10-13 | End: 2021-10-17 | Stop reason: HOSPADM

## 2021-10-13 RX ORDER — HYDROCODONE BITARTRATE AND ACETAMINOPHEN 5; 325 MG/1; MG/1
1 TABLET ORAL EVERY 4 HOURS PRN
Status: DISCONTINUED | OUTPATIENT
Start: 2021-10-13 | End: 2021-10-17 | Stop reason: HOSPADM

## 2021-10-13 RX ORDER — SODIUM CHLORIDE 0.9 % (FLUSH) 0.9 %
10 SYRINGE (ML) INJECTION AS NEEDED
Status: DISCONTINUED | OUTPATIENT
Start: 2021-10-13 | End: 2021-10-17 | Stop reason: HOSPADM

## 2021-10-13 RX ORDER — MORPHINE SULFATE 2 MG/ML
2 INJECTION, SOLUTION INTRAMUSCULAR; INTRAVENOUS ONCE
Status: COMPLETED | OUTPATIENT
Start: 2021-10-13 | End: 2021-10-13

## 2021-10-13 RX ORDER — CETIRIZINE HYDROCHLORIDE 10 MG/1
10 TABLET ORAL DAILY
Refills: 1 | Status: DISCONTINUED | OUTPATIENT
Start: 2021-10-13 | End: 2021-10-17 | Stop reason: HOSPADM

## 2021-10-13 RX ORDER — BISACODYL 10 MG
10 SUPPOSITORY, RECTAL RECTAL DAILY PRN
Status: DISCONTINUED | OUTPATIENT
Start: 2021-10-13 | End: 2021-10-17 | Stop reason: HOSPADM

## 2021-10-13 RX ORDER — ACETAMINOPHEN 650 MG/1
650 SUPPOSITORY RECTAL EVERY 4 HOURS PRN
Status: DISCONTINUED | OUTPATIENT
Start: 2021-10-13 | End: 2021-10-17 | Stop reason: HOSPADM

## 2021-10-13 RX ORDER — DEXTROSE MONOHYDRATE 25 G/50ML
25 INJECTION, SOLUTION INTRAVENOUS
Status: DISCONTINUED | OUTPATIENT
Start: 2021-10-13 | End: 2021-10-17 | Stop reason: HOSPADM

## 2021-10-13 RX ORDER — POTASSIUM CHLORIDE 750 MG/1
10 TABLET, FILM COATED, EXTENDED RELEASE ORAL DAILY
Status: DISCONTINUED | OUTPATIENT
Start: 2021-10-13 | End: 2021-10-17 | Stop reason: HOSPADM

## 2021-10-13 RX ORDER — SODIUM CHLORIDE 9 MG/ML
125 INJECTION, SOLUTION INTRAVENOUS CONTINUOUS
Status: DISCONTINUED | OUTPATIENT
Start: 2021-10-14 | End: 2021-10-13

## 2021-10-13 RX ORDER — MIDAZOLAM HYDROCHLORIDE 1 MG/ML
INJECTION INTRAMUSCULAR; INTRAVENOUS AS NEEDED
Status: DISCONTINUED | OUTPATIENT
Start: 2021-10-13 | End: 2021-10-13 | Stop reason: HOSPADM

## 2021-10-13 RX ORDER — ONDANSETRON 2 MG/ML
4 INJECTION INTRAMUSCULAR; INTRAVENOUS EVERY 6 HOURS PRN
Status: DISCONTINUED | OUTPATIENT
Start: 2021-10-13 | End: 2021-10-17 | Stop reason: HOSPADM

## 2021-10-13 RX ORDER — ONDANSETRON 2 MG/ML
4 INJECTION INTRAMUSCULAR; INTRAVENOUS ONCE
Status: COMPLETED | OUTPATIENT
Start: 2021-10-13 | End: 2021-10-13

## 2021-10-13 RX ORDER — FUROSEMIDE 40 MG/1
40 TABLET ORAL DAILY
Status: DISCONTINUED | OUTPATIENT
Start: 2021-10-13 | End: 2021-10-13

## 2021-10-13 RX ORDER — VILAZODONE HYDROCHLORIDE 40 MG/1
40 TABLET ORAL DAILY
Status: DISCONTINUED | OUTPATIENT
Start: 2021-10-14 | End: 2021-10-17 | Stop reason: HOSPADM

## 2021-10-13 RX ORDER — ASPIRIN 81 MG/1
81 TABLET ORAL DAILY
Status: DISCONTINUED | OUTPATIENT
Start: 2021-10-14 | End: 2021-10-14

## 2021-10-13 RX ORDER — ACETAMINOPHEN 325 MG/1
650 TABLET ORAL EVERY 4 HOURS PRN
Status: DISCONTINUED | OUTPATIENT
Start: 2021-10-13 | End: 2021-10-17 | Stop reason: HOSPADM

## 2021-10-13 RX ORDER — FUROSEMIDE 40 MG/1
40 TABLET ORAL DAILY
Status: DISCONTINUED | OUTPATIENT
Start: 2021-10-14 | End: 2021-10-17 | Stop reason: HOSPADM

## 2021-10-13 RX ORDER — AMOXICILLIN 250 MG
2 CAPSULE ORAL 2 TIMES DAILY
Status: DISCONTINUED | OUTPATIENT
Start: 2021-10-13 | End: 2021-10-17 | Stop reason: HOSPADM

## 2021-10-13 RX ADMIN — PERFLUTREN 3 ML: 6.52 INJECTION, SUSPENSION INTRAVENOUS at 10:51

## 2021-10-13 RX ADMIN — MORPHINE SULFATE 2 MG: 2 INJECTION, SOLUTION INTRAMUSCULAR; INTRAVENOUS at 06:15

## 2021-10-13 RX ADMIN — SODIUM CHLORIDE, PRESERVATIVE FREE 10 ML: 5 INJECTION INTRAVENOUS at 12:14

## 2021-10-13 RX ADMIN — HYDROCODONE BITARTRATE AND ACETAMINOPHEN 1 TABLET: 5; 325 TABLET ORAL at 20:03

## 2021-10-13 RX ADMIN — NITROGLYCERIN 0.4 MG: 0.4 TABLET SUBLINGUAL at 05:49

## 2021-10-13 RX ADMIN — ASPIRIN 325 MG: 325 TABLET ORAL at 01:13

## 2021-10-13 RX ADMIN — NITROGLYCERIN 0.4 MG: 0.4 TABLET SUBLINGUAL at 05:42

## 2021-10-13 RX ADMIN — GABAPENTIN 600 MG: 300 CAPSULE ORAL at 20:03

## 2021-10-13 RX ADMIN — TEMAZEPAM 30 MG: 15 CAPSULE ORAL at 20:02

## 2021-10-13 RX ADMIN — CLONAZEPAM 1 MG: 1 TABLET ORAL at 20:03

## 2021-10-13 RX ADMIN — MORPHINE SULFATE 4 MG: 2 INJECTION, SOLUTION INTRAMUSCULAR; INTRAVENOUS at 10:13

## 2021-10-13 RX ADMIN — OXYCODONE AND ACETAMINOPHEN 1 TABLET: 7.5; 325 TABLET ORAL at 14:41

## 2021-10-13 RX ADMIN — SODIUM CHLORIDE, PRESERVATIVE FREE 10 ML: 5 INJECTION INTRAVENOUS at 20:05

## 2021-10-13 RX ADMIN — METOPROLOL TARTRATE 25 MG: 25 TABLET, FILM COATED ORAL at 22:53

## 2021-10-13 RX ADMIN — ONDANSETRON 4 MG: 2 INJECTION INTRAMUSCULAR; INTRAVENOUS at 10:14

## 2021-10-13 RX ADMIN — ONDANSETRON 4 MG: 2 INJECTION INTRAMUSCULAR; INTRAVENOUS at 01:12

## 2021-10-13 RX ADMIN — NITROGLYCERIN 0.4 MG: 0.4 TABLET SUBLINGUAL at 09:37

## 2021-10-13 RX ADMIN — NITROGLYCERIN 0.4 MG: 0.4 TABLET SUBLINGUAL at 05:36

## 2021-10-13 RX ADMIN — NITROGLYCERIN 0.4 MG: 0.4 TABLET SUBLINGUAL at 09:20

## 2021-10-13 RX ADMIN — NITROGLYCERIN 0.4 MG: 0.4 TABLET SUBLINGUAL at 09:25

## 2021-10-13 NOTE — DISCHARGE INSTRUCTIONS
[facility]  [address] [place] [address]    Coronary Angiogram (Radial/Ulnar Approach) After Care    Refer to this sheet in the next few weeks. These instructions provide you with information on caring for yourself after your procedure. Your caregiver may also give you more specific instructions. Your treatment has been planned according to current medical practices, but problems sometimes occur. Call your caregiver if you have any problems or questions after your procedure.    Home Care Instructions:  · You may shower the day after the procedure. Remove the bandage (dressing) and gently wash the site with plain soap and water. Gently pat the site dry. You may apply a band aid daily for 2 days if desired.    · Do not apply powder or lotion to the site.  · Do not submerge the affected site in water for 3 to 5 days or until the site is completely healed.   · Do not lift, push or pull anything over 5 pounds for 5 days after your procedure or as directed by your physician.  As a reference, a gallon of milk weighs 8 pounds.   · Inspect the site at least twice daily. You may notice some bruising at the site and it may be tender for 1 to 2 weeks.     · Increase your fluid intake for the next 2 days.    · Keep arm elevated for 24 hours. For the remainder of the day, keep your arm in “Pledge of Allegiance” position when up and about.     · You may drive 24 hours after the procedure unless otherwise instructed by your caregiver.  · Do not operate machinery or power tools for 24 hours.  · A responsible adult should be with you for the first 24 hours after you arrive home. Do not make any important legal decisions or sign legal papers for 24 hours.  Do not drink alcohol for 24 hours.    · Metformin or any medications containing Metformin should not be taken for 48 hours after your procedure.      Call Your Doctor if:   · You have unusual pain at the radial/ulnar (wrist) site.  · You have redness, warmth,  swelling, or pain at the radial/ulnar (wrist) site.  · You have drainage (other than a small amount of blood on the dressing).  · `You have chills or a fever > 101.  · Your arm becomes pale or dark, cool, tingly, or numb.  · You develop chest pain, shortness of breath, feel faint or pass out.    · You have any symptoms of a stroke.  Remember BE FAST  · B-balance. Sudden trouble walking or loss of balance.  · E-eyes.  Sudden changes in how you see or a sudden onset of a very bad headache.   · F-face. Sudden weakness or loss of feeling of the face or facial droop on one side.   · A-arms Sudden weakness or numbness in one arm.  One arm drifts down if they are both held out in front of you.   · S-speech.  Sudden trouble speaking, slurred speech or trouble understanding what are saying.   · T-time  Time to call emergency services.  Write down the symptoms and the time they started.   of loss of feeling in an arm.  This happens suddenly and usually on one side of the body.  You have heavy bleeding from the site, hold pressure on the site for 20 minutes.  If the bleeding stops, apply a fresh bandage and call your cardiologist.  However, if you continue to have bleeding, call 911.         [facility]  [address] [place] [address]      Coronary Angiogram (Femoral Approach) After Care     Refer to this sheet in the next few weeks. These instructions provide you with information on caring for yourself after your procedure. Your health care provider may also give you more specific instructions. Your treatment has been planned according to current medical practices, but problems sometimes occur. Call your health care provider if you have any problems or questions after your procedure.      What to Expect After the Procedure:  After your procedure, it is typical to have the following sensations:  · Minor discomfort or tenderness and a small bump at the catheter insertion site. The bump should usually  decrease in size and tenderness within 1 to 2 weeks.  · Any bruising will usually fade within 2 to 4 weeks.  Home Care Instructions:  · Do not apply powder or lotion to the site.  · Do not take baths, swim, or use a hot tub until your health care provider approves and the site is completely healed.  · Do not bend, squat, or lift anything over 20 lb (9 kg) or as directed by your health care provider. However, we recommend lifting nothing heavier than a gallon of milk.    · You may shower 24 hours after the procedure. Remove the bandage (dressing) and gently wash the site with plain soap and water. Gently pat the site dry. You may apply a band aid daily for 2 days if desired.    · Inspect the site at least twice daily.  · Increase your fluid intake for the next 2 days.    · Limit your activity for the first 48 hours. .    · Avoid strenuous activity for 1 week or as advised by your physician.    · Follow instructions about when you can drive or return to work as directed by your physician.    · Hold direct pressure over the site when you cough, sneeze, laugh or change positions.  Do this for the next 2 days.    · Do not operate machinery or power tools for 24 hours.  · A responsible adult should be with you for the first 24 hours after you arrive home. Do not make any important legal decisions or sign legal papers for 24 hours.  Do not drink alcohol for 24 hours.  · Metformin or any medications containing Metformin should not be taken for 48 hours after your procedure.    Call Your Doctor If:  · You have drainage (other than a small amount of blood on the dressing).  · You have chills or a fever > 101.  · You have redness, warmth, swelling(larger than a walnut), or pain at the insertion site  · .You have heavy bleeding from the site. If this happens, hold pressure on the site and call 911.  · You develop chest pain or shortness of breath, feel faint, or pass out.  · You develop pain, discoloration, coldness, numbness,  tingling, or severe bruising in the leg that held the catheter.  · You develop bleeding from any other place, such as the bowels.  · You have heavy bleeding from the site, hold pressure on the site for 20 minutes.  If the bleeding stops, apply a fresh bandage and call your cardiologist.  However, if you continue to have bleeding, call 911>    · You have any symptoms of a stroke.  Remember BE FAST  · B-balance. Sudden trouble walking or loss of balance.  · E-eyes.  Sudden changes in how you see or a sudden onset of a very bad headache.   · F-face. Sudden weakness or loss of feeling of the face or facial droop on one side.   · A-arms Sudden weakness or numbness in one arm.  One arm drifts down if they are both held out in front of you.   · S-speech.  Sudden trouble speaking, slurred speech or trouble understanding what are saying.   · T-time  Time to call emergency services.  Write down the symptoms and the time they started.   of loss of feeling in an arm.  This happens suddenly and usually on one side of the body.       Make Sure You:  · Understand these instructions.  · Will watch your condition.  · Will get help right away if you are not doing well or get worse.
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
## 2021-10-13 NOTE — H&P
.   [facility]   HISTORY AND PHYSICAL    Patient Name: Travis Yancey  : 1965  MRN: [id number]  Primary Care Physician:  Jeferson Harmon MD  Date of admission: 10/13/2021    Subjective   Subjective     Chief Complaint: Chest pain and dyspnea    HPI:    Travis Yancey is a 55 y.o. female history of diabetes, hypertension, depression, anxiety, and ADHD presented [facility] complaining of chest pain and dyspnea.  States her symptoms are chronic in nature but for the past week she been having some dyspnea with exertion and chest pain.  Patient describes her chest pain as squeezing and pressure that originate in the center of her chest and radiates to her neck, jaw, and shoulders.  Patient reports pain while deep breathing.   patient reports that she was hospitalized last week at [facility] for chest pain and shortness of breath.  States she was discharged the next day and told that her symptoms were due to COPD.  States she had a cardiac cath in  but denies having any stents placed.  Denies headache, dizziness nausea, vomiting, diarrhea, abdominal pain, urinary symptoms, or edema to lower extremities.     Review of Systems   All systems were reviewed and negative except for: Chest pain and dyspnea    Personal History     Past Medical History:   Diagnosis Date   • ADHD (attention deficit hyperactivity disorder)    • Anxiety    • Depression    • Diabetes mellitus (HCC)    • Hypertension        Past Surgical History:   Procedure Laterality Date   • APPENDECTOMY     • CERVICAL FUSION      c5-6   •  SECTION     • CHOLECYSTECTOMY     • DISCECTOMY POSTERIOR THORACIC TRANSPEDICULAR APPROACH     • PARTIAL HIP ARTHROPLASTY Right        Family History: family history is not on file. Otherwise pertinent FHx was reviewed and not pertinent to current issue.    Social History:  reports that she quit smoking about 2 years ago. Her smoking use included cigarettes. She has  never used smokeless tobacco. She reports previous alcohol use. She reports that she does not use drugs.    Home Medications:  amphetamine-dextroamphetamine, cephalexin, clonazePAM, fenofibrate micronized, furosemide, gabapentin, levocetirizine, metFORMIN, metoprolol tartrate, ondansetron, pantoprazole, potassium chloride, sulfamethoxazole-trimethoprim, temazepam, traZODone, valACYclovir, and vilazodone    Allergies:  No Known Allergies    Objective   Objective     Vitals:   Temp:  [97.4 °F (36.3 °C)] 97.4 °F (36.3 °C)  Heart Rate:  [] 114  Resp:  [22] 22  BP: (118-153)/() 153/83  Physical Exam    Constitutional: Awake, alert   Eyes: PERRLA, sclerae anicteric, no conjunctival injection   HENT: NCAT, mucous membranes moist   Neck: Supple, no thyromegaly, no lymphadenopathy, trachea midline   Respiratory: Clear to auscultation bilaterally, nonlabored respirations    Cardiovascular: RRR, no murmurs, rubs, or gallops, palpable pedal pulses bilaterally.  Reproducible chest pain   Gastrointestinal: Positive bowel sounds, soft, obese, nontender, nondistended   Musculoskeletal: No bilateral ankle edema, no clubbing or cyanosis to extremities   Psychiatric: Hyperactive, anxious, cooperative   Neurologic: Oriented x 3, strength symmetric in all extremities, Cranial Nerves grossly intact to confrontation, speech clear   Skin: No rashes     Result Review    Result Review:  I have personally reviewed the results from the time of this admission to 10/13/2021 06:34 EDT and agree with these findings:  [x]  Laboratory  []  Microbiology  [x]  Radiology  [x]  EKG/Telemetry   []  Cardiology/Vascular   []  Pathology  []  Old records  []  Other:  Most notable findings include:   Creatinine 1.18 BUN 26      Assessment/Plan   Assessment / Plan     Brief Patient Summary:  Travis Yancey is a 55 y.o. female who is here for evaluation of chest pain and dyspnea.  Patient has been seen and hospitalized at [facility]  for chest pain dyspnea.      Active Hospital Problems:  Active Hospital Problems    Diagnosis    • Chest pain      Plan:   Chest pain  EKG negative for ischemic changes  Troponin negative at the ER  Chest x-ray negative for infiltration  Repeat troponin negative  Cardiology consult  Sublingual nitroglycerin as needed    Hypertension  Continue metoprolol    ADHD  Continue Adderall     Distolic CHF  Continue Lasix 40 mg  Potassium 10 mEq    Type II diabetes  Metformin 500 mg 2 tabs twice daily  Glucose level for meals and at bedtime          DVT prophylaxis:  Mechanical DVT prophylaxis orders are present.    CODE STATUS:    Level Of Support Discussed With: Patient  Code Status: CPR  Medical Interventions (Level of Support Prior to Arrest): Full    Admission Status:  I believe this patient meets sedation status.    Electronically signed by JUNO Reed, 10/13/21, 6:34 AM EDT.         Dr. Elmore's team has requested patient to be admitted to [facility] hospitalist team for further evaluation of her shortness of breath and chest pain and he plans to proceed with left and right heart cath as per his documentation.   Call placed to [facility] and Dr. Brooke Espinoza graciously accepts to her service.    This serves as a transfer note.

## 2021-10-13 NOTE — ED PROVIDER NOTES
EMERGENCY DEPARTMENT ENCOUNTER    CHIEF COMPLAINT  Chief Complaint: Chest pain/shortness of breath  History given by: Patient  History limited by: None  Room Number:   PMD: Jeferson Harmon MD      HPI:  Pt is a 55 y.o. female who presents complaining of gradual in onset of chest discomfort that has been present and progressively worsening for the past 1 week.  She describes the chest pain as a sharp sensation as well as a squeezing sensation to the mid and anterior portion of her chest.  She states that the pain is moderate in intensity and radiates in through to her back as well as into bilateral upper extremities.  She states that the pain is made worse with exertion and not made better by any known factor thus far.  She states that she has had these symptoms previously and was actually evaluated at [facility] 1 week ago but states that her evaluation was unremarkable and she was discharged home.  She denies a recent cardiac catheterization or stress test.  Associated symptoms include shortness of breath as well as diaphoresis.  She denies nausea and vomiting, abdominal pain, or fevers and chills.  There is been no treatment rendered prior to ED arrival.      PAST MEDICAL HISTORY  Active Ambulatory Problems     Diagnosis Date Noted   • No Active Ambulatory Problems     Resolved Ambulatory Problems     Diagnosis Date Noted   • No Resolved Ambulatory Problems     Past Medical History:   Diagnosis Date   • ADHD (attention deficit hyperactivity disorder)    • Anxiety    • Depression    • Diabetes mellitus (HCC)    • Hypertension        PAST SURGICAL HISTORY  Past Surgical History:   Procedure Laterality Date   • APPENDECTOMY     • CERVICAL FUSION      c5-6   •  SECTION     • CHOLECYSTECTOMY     • DISCECTOMY POSTERIOR THORACIC TRANSPEDICULAR APPROACH     • PARTIAL HIP ARTHROPLASTY Right        FAMILY HISTORY  History reviewed. No pertinent family history.    SOCIAL HISTORY  Social  History     Socioeconomic History   • Marital status:    Tobacco Use   • Smoking status: Former Smoker     Types: Cigarettes     Quit date: 2019     Years since quittin.1   • Smokeless tobacco: Never Used   • Tobacco comment: Intermittent from age 22 on-- 2-3 pack/ week   Substance and Sexual Activity   • Alcohol use: Not Currently   • Drug use: Never       ALLERGIES  Patient has no known allergies.    REVIEW OF SYSTEMS  Review of Systems   Constitutional: Positive for diaphoresis. Negative for fever.   HENT: Negative for sore throat.    Eyes: Negative.    Respiratory: Positive for shortness of breath. Negative for cough.    Cardiovascular: Positive for chest pain.   Gastrointestinal: Negative for abdominal pain, diarrhea and vomiting.   Genitourinary: Negative for dysuria.   Musculoskeletal: Negative for neck pain.   Skin: Negative for rash.   Allergic/Immunologic: Negative.    Neurological: Negative for weakness, numbness and headaches.   Hematological: Negative.    Psychiatric/Behavioral: Negative.    All other systems reviewed and are negative.      PHYSICAL EXAM  ED Triage Vitals   Temp Heart Rate Resp BP SpO2   10/13/21 0005 10/13/21 0004 10/13/21 0004 10/13/21 0006 10/13/21 0004   97.4 °F (36.3 °C) 104 22 123/90 98 %      Temp src Heart Rate Source Patient Position BP Location FiO2 (%)   10/13/21 0005 10/13/21 0004 -- 10/13/21 0006 --   Oral Monitor  Right arm        Physical Exam  Vitals and nursing note reviewed.   Constitutional:       General: She is not in acute distress.  HENT:      Head: Normocephalic and atraumatic.   Eyes:      Pupils: Pupils are equal, round, and reactive to light.   Cardiovascular:      Rate and Rhythm: Normal rate and regular rhythm.      Heart sounds: Normal heart sounds.   Pulmonary:      Effort: Pulmonary effort is normal. No respiratory distress.      Breath sounds: Normal breath sounds.   Abdominal:      Palpations: Abdomen is soft.      Tenderness: There is  no abdominal tenderness. There is no guarding or rebound.   Musculoskeletal:         General: Normal range of motion.      Cervical back: Normal range of motion and neck supple.   Skin:     General: Skin is warm and dry.      Findings: No rash.   Neurological:      Mental Status: She is alert and oriented to person, place, and time.      Sensory: Sensation is intact.   Psychiatric:         Mood and Affect: Mood and affect normal.         LAB RESULTS  Lab Results (last 24 hours)     Procedure Component Value Units Date/Time    CBC & Differential [[id number]]  (Abnormal) Collected: 10/13/21 0107    Specimen: Blood Updated: 10/13/21 0117    Narrative:      The following orders were created for panel order CBC & Differential.  Procedure                               Abnormality         Status                     ---------                               -----------         ------                     CBC Auto Differential[[id number]]        Abnormal            Final result                 Please view results for these tests on the individual orders.    Comprehensive Metabolic Panel [[id number]]  (Abnormal) Collected: 10/13/21 0107    Specimen: Blood Updated: 10/13/21 0147     Glucose 119 mg/dL      BUN 26 mg/dL      Creatinine 1.18 mg/dL      Sodium 140 mmol/L      Potassium 4.3 mmol/L      Comment: Slight hemolysis detected by analyzer. Results may be affected.        Chloride 102 mmol/L      CO2 25.5 mmol/L      Calcium 9.5 mg/dL      Total Protein 7.6 g/dL      Albumin 4.70 g/dL      ALT (SGPT) 44 U/L      AST (SGOT) 47 U/L      Alkaline Phosphatase 61 U/L      Total Bilirubin 0.2 mg/dL      eGFR Non African Amer 48 mL/min/1.73      Globulin 2.9 gm/dL      A/G Ratio 1.6 g/dL      BUN/Creatinine Ratio 22.0     Anion Gap 12.5 mmol/L     Narrative:      GFR Normal >60  Chronic Kidney Disease <60  Kidney Failure <15      BNP [[id number]]  (Normal) Collected: 10/13/21 0107    Specimen: Blood Updated: 10/13/21 0145     proBNP  32.4 pg/mL     Narrative:      Among patients with dyspnea, NT-proBNP is highly sensitive for the detection of acute congestive heart failure. In addition NT-proBNP of <300 pg/ml effectively rules out acute congestive heart failure with 99% negative predictive value.    Results may be falsely decreased if patient taking Biotin.      Troponin [[id number]]  (Normal) Collected: 10/13/21 0107    Specimen: Blood Updated: 10/13/21 0147     Troponin T <0.010 ng/mL     Narrative:      Troponin T Reference Range:  <= 0.03 ng/mL-   Negative for AMI  >0.03 ng/mL-     Abnormal for myocardial necrosis.  Clinicians would have to utilize clinical acumen, EKG, Troponin and serial changes to determine if it is an Acute Myocardial Infarction or myocardial injury due to an underlying chronic condition.       Results may be falsely decreased if patient taking Biotin.      CBC Auto Differential [[id number]]  (Abnormal) Collected: 10/13/21 0107    Specimen: Blood Updated: 10/13/21 0117     WBC 9.44 10*3/mm3      RBC 4.48 10*6/mm3      Hemoglobin 11.9 g/dL      Hematocrit 37.1 %      MCV 82.8 fL      MCH 26.6 pg      MCHC 32.1 g/dL      RDW 15.4 %      RDW-SD 47.1 fl      MPV 12.2 fL      Platelets 239 10*3/mm3      Neutrophil % 64.8 %      Lymphocyte % 24.7 %      Monocyte % 5.6 %      Eosinophil % 2.2 %      Basophil % 1.2 %      Immature Grans % 1.5 %      Neutrophils, Absolute 6.12 10*3/mm3      Lymphocytes, Absolute 2.33 10*3/mm3      Monocytes, Absolute 0.53 10*3/mm3      Eosinophils, Absolute 0.21 10*3/mm3      Basophils, Absolute 0.11 10*3/mm3      Immature Grans, Absolute 0.14 10*3/mm3      nRBC 0.0 /100 WBC     COVID PRE-OP / PRE-PROCEDURE SCREENING ORDER (NO ISOLATION) - Swab, Nasopharynx [[id number]]  (Normal) Collected: 10/13/21 0323    Specimen: Swab from Nasopharynx Updated: 10/13/21 0427    Narrative:      The following orders were created for panel order COVID PRE-OP / PRE-PROCEDURE SCREENING ORDER (NO ISOLATION) - Swab,  Nasopharynx.  Procedure                               Abnormality         Status                     ---------                               -----------         ------                     COVID-19,[facility]...[[id number]]  Normal              Final result                 Please view results for these tests on the individual orders.    COVID-19,[facility] CEPHEID/KYLAH NP SWAB IN TRANSPORT MEDIA 8-12 HR TAT - Swab, Nasopharynx [[id number]]  (Normal) Collected: 10/13/21 0323    Specimen: Swab from Nasopharynx Updated: 10/13/21 0427     COVID19 Not Detected    Narrative:      Fact sheet for providers: https://www.fda.gov/media/043039/download     Fact sheet for patients: https://www.fda.gov/media/432063/download    Troponin [[id number]]  (Normal) Collected: 10/13/21 0529    Specimen: Blood Updated: 10/13/21 0606     Troponin T <0.010 ng/mL     Narrative:      Troponin T Reference Range:  <= 0.03 ng/mL-   Negative for AMI  >0.03 ng/mL-     Abnormal for myocardial necrosis.  Clinicians would have to utilize clinical acumen, EKG, Troponin and serial changes to determine if it is an Acute Myocardial Infarction or myocardial injury due to an underlying chronic condition.       Results may be falsely decreased if patient taking Biotin.            I ordered the above labs and reviewed the results    RADIOLOGY  XR Chest 1 View   Final Result         Electronically signed by Barbra Aj MD on 10-13-21 at 0050           I ordered the above noted radiological studies. Interpreted by radiologist.  Reviewed by me in PACS.       PROCEDURES  Procedures  EKG          EKG time: 0014  Rhythm/Rate: NSR, 96  P waves and TX: nml  QRS, axis: nml, nml   ST and T waves: nml     Interpreted Contemporaneously by me, independently viewed  No previous EKG available for comparison      PROGRESS AND CONSULTS     The patient was wearing a facemask upon entrance into the room and remained in such throughout their visit.  I was wearing  PPE including a facemask, eye protection, as well as gloves at any point entering the room and throughout the visit    0250  On reevaluation, the patient does state that she is feeling somewhat better however continues to complain of some mild chest discomfort with shortness of breath.  I did inform her that the work-up in the emergency room is unremarkable however we would admit her to the observation unit today for monitoring, serial cardiac enzymes, and cardiology evaluation.  The patient is in agreement with the plan and all questions have been answered.    0315  Case discussed with the nurse practitioner in the observation unit who agrees to admit the patient to the unit under the care of Dr. Estes.      MEDICAL DECISION MAKING  Results were reviewed/discussed with the patient and they were also made aware of online access. Pt also made aware that some labs, such as cultures, will not be resulted during ER visit and follow up with PMD is necessary.     MDM  Number of Diagnoses or Management Options     Amount and/or Complexity of Data Reviewed  Clinical lab tests: ordered and reviewed  Tests in the radiology section of CPT®: ordered and reviewed  Tests in the medicine section of CPT®: ordered and reviewed  Review and summarize past medical records: yes (Upon medical records review, the patient was last seen and evaluated on 8/24/2021 for cutaneous abscess)  Discuss the patient with other providers: yes (Nurse practitioner in observation unit who will admit the patient for Dr. Estes)  Independent visualization of images, tracings, or specimens: yes (Unremarkable chest x-ray)           DIAGNOSIS  Final diagnoses:   Precordial chest pain   Dyspnea on exertion       DISPOSITION  ADMISSION    Discussed treatment plan and reason for admission with pt/family and admitting physician.  Pt/family voiced understanding of the plan for admission for further testing/treatment as needed.         Latest Documented Vital  Signs:  As of 07:51 EDT  BP- 124/88 HR- 111 Temp- 97.4 °F (36.3 °C) (Oral) O2 sat- 93%         Jb Rich MD  10/13/21 0757

## 2021-10-13 NOTE — ED TRIAGE NOTES
Pt presents to ED with complaints of increased intermitten SOA and CP x1 week. Pt states she was diagnosed with CHF this past month.

## 2021-10-13 NOTE — CONSULTS
Date of Hospital Visit: 10/13/21  Encounter Provider: Daniel Elmore MD  Place of Service: [facility] CARDIOLOGY  Patient Name: Travis Yancey  :[id number]  [id number]  Referral Provider: Madeleine Estes MD    Chief complaint: Chest pain    History of Present Illness:  This is a 55 year old female with history of anxiety, depression, ADHD, DM, HTN, IBS, spinal stenosis, chronic pain, and fibromyalgia- who came into our ER yesterday for intermittent chest pain and dyspnea. She felt a squeezing sensation in the middle of chest that would radiate into her shoulders, neck, and jaw. She would also feel discomfort with deep inspirations.    She had reported that she was hospitalized last week at [facility] for similar complaints where she stated she had been treated for a COPD exacerbation & CHF, but overall had an unremarkable stay, where she was then sent home.    She reported having a cardiac catheterization in 2020 at [facility] without needing any sort of intervention ( will try to get records). She states that she does not have a cardiologist, and did not follow up with anyone after her catheterization.    Upon reviewing more records- cardiac catheterization report on 20 found normal coronaries, but she did have elevated LVEDP- 32 mmHg, but it was recommended that she needed aggressive risk factor modification with weight loss and diuresis. She also had an echocardiogram on 20 which found EF of 51%..     Per nursing she has not had any more complaints of chest pain after receiving SL x 3, 325 mg of ASA,  2 mg of IV morphine, and 4 mg of zofran. Chest x ray was negative for acute processes.    Negative troponin x 2. Creatine of 1.18. hgb of 11.9 (it was 13.7 on ).     Reviewed her HPI and agree with it.  Is a difficult case she is been having shortness of breath orthopnea PND for the last month it sounds like no real edema no change in her  weight although she is picked up 40 pounds over the last 2 years.  She is also complaining of substernal chest discomfort was going into her left arm with some necessarily with activity but she cannot do much activity she is very hot and does not take much for her to get short of breath.  She does not smoke she did have a heart cath a year ago the only thing about that which she had elevated LVEDP reportedly    XR chest 1 vw on 10/13/21-  FINDINGS:    Lungs:  No significant abnormality.  No consolidation.    Pleural space:  No significant abnormality.  No pneumothorax.    Heart:  No significant abnormality.  No cardiomegaly.    Mediastinum:  No significant abnormality.    Bones joints:  No acute osseous abnormality.     IMPRESSION:         No acute cardiopulmonary process.    Past Medical History:   Diagnosis Date   • ADHD (attention deficit hyperactivity disorder)    • Anxiety    • Depression    • Diabetes mellitus (HCC)    • Hypertension        Past Surgical History:   Procedure Laterality Date   • APPENDECTOMY     • CERVICAL FUSION      c5-6   •  SECTION     • CHOLECYSTECTOMY     • DISCECTOMY POSTERIOR THORACIC TRANSPEDICULAR APPROACH     • PARTIAL HIP ARTHROPLASTY Right        Medications Prior to Admission   Medication Sig Dispense Refill Last Dose   • amphetamine-dextroamphetamine (ADDERALL) 30 MG tablet Take 1 tablet by mouth 2 (two) times a day.      • cephalexin (KEFLEX) 500 MG capsule       • clonazePAM (KlonoPIN) 1 MG tablet Take 1 tablet by mouth 3 (Three) Times a Day.      • fenofibrate micronized (LOFIBRA) 134 MG capsule Take 1 capsule by mouth Daily. 90 capsule 1    • furosemide (LASIX) 40 MG tablet Take 1 tablet by mouth Daily. 90 tablet 0    • gabapentin (NEURONTIN) 600 MG tablet Take 1 tablet by mouth 3 (Three) Times a Day.      • levocetirizine (XYZAL) 5 MG tablet Take 0.5 tablets by mouth Daily. 90 tablet 1    • metFORMIN (GLUCOPHAGE) 500 MG tablet Take 2 tablets by mouth 2 (two) times a  day.      • metoprolol tartrate (LOPRESSOR) 25 MG tablet Take 1 tablet by mouth 2 (Two) Times a Day. 180 tablet 1    • ondansetron (ZOFRAN) 4 MG tablet       • pantoprazole (PROTONIX) 40 MG EC tablet Take 1 tablet by mouth Daily. 90 tablet 1    • potassium chloride (MICRO-K) 10 MEQ CR capsule Take 1 capsule by mouth Daily. 90 capsule 1    • sulfamethoxazole-trimethoprim (BACTRIM DS,SEPTRA DS) 800-160 MG per tablet 2 (two) times a day.      • temazepam (RESTORIL) 30 MG capsule Take 1 capsule by mouth Every Night.      • traZODone (DESYREL) 50 MG tablet Take 1 tablet by mouth At Night As Needed for Sleep.      • valACYclovir (VALTREX) 1000 MG tablet TAKE 1 TABLET BY MOUTH THREE TIMES DAILY FOR 5 DAYS      • Viibryd 40 MG tablet tablet Take 1 tablet by mouth Daily.          Current Meds  Scheduled Meds:sodium chloride, 10 mL, Intravenous, Q12H      Continuous Infusions:   PRN Meds:.•  Morphine  •  nitroglycerin  •  ondansetron  •  [COMPLETED] Insert peripheral IV **AND** sodium chloride  •  sodium chloride    Allergies as of 10/13/2021   • (No Known Allergies)       Social History     Socioeconomic History   • Marital status:    Tobacco Use   • Smoking status: Former Smoker     Types: Cigarettes     Quit date: 2019     Years since quittin.1   • Smokeless tobacco: Never Used   • Tobacco comment: Intermittent from age 22 on-- 2-3 pack/ week   Substance and Sexual Activity   • Alcohol use: Not Currently   • Drug use: Never       History reviewed. No pertinent family history.    REVIEW OF SYSTEMS:   ROS was performed and is negative except as outlined in HPI     REVIEW OF SYSTEMS:   CONSTITUTIONAL: No weight loss, fever, chills, weakness or fatigue.   HEENT: Eyes: No visual loss, blurred vision, double vision or yellow sclerae. Ears, Nose, Throat: No hearing loss, sneezing, congestion, runny nose or sore throat.   SKIN: No rash or itching.     RESPIRATORY: No shortness of breath, hemoptysis, cough or  "sputum.   GASTROINTESTINAL: No anorexia, nausea, vomiting or diarrhea. No abdominal pain, bright red blood per rectum or melena.  GENITOURINARY: No burning on urination, hematuria or increased frequency.  NEUROLOGICAL: No headache, dizziness, syncope, paralysis, ataxia, numbness or tingling in the extremities. No change in bowel or bladder control.   MUSCULOSKELETAL: No muscle, back pain, joint pain or stiffness.   HEMATOLOGIC: No anemia, bleeding or bruising.   LYMPHATICS: No enlarged nodes. No history of splenectomy.   PSYCHIATRIC: No history of depression, anxiety, hallucinations.   ENDOCRINOLOGIC: No reports of sweating, cold or heat intolerance. No polyuria or polydipsia.       Objective:   Temp:  [97.4 °F (36.3 °C)-97.7 °F (36.5 °C)] 97.7 °F (36.5 °C)  Heart Rate:  [] 108  Resp:  [22] 22  BP: (113-159)/() 120/78  Body mass index is 39.27 kg/m².  Flowsheet Rows      First Filed Value   Admission Height 165.1 cm (65\") Documented at 10/13/2021 0053   Admission Weight 107 kg (236 lb 6.4 oz) Documented at 10/13/2021 0053        Vitals:    10/13/21 1009   BP: 120/78   Pulse: 108   Resp: 22   Temp: 97.7 °F (36.5 °C)   SpO2: 96%       Head:    Normocephalic, without obvious abnormality, atraumatic   Eyes:            Lids and lashes normal, conjunctivae and sclerae normal, no   icterus, no pallor   Ears:    Ears appear intact with no abnormalities noted   Throat:   No oral lesions, dentition good   Neck:   No adenopathy, supple, trachea midline, no thyromegaly, no   carotid bruit, no JVD   Lungs:     Breath sounds are equal and clear to auscultation    Heart:    Normal S1 and S2, RRR, No M/G/R   Abdomen:     Normal bowel sounds, no masses, no organomegaly, soft        non-tender, non-distended, no guarding   Extremities:   Moves all extremities well, no edema, no cyanosis, no redness   Pulses:   Pulses palpable and equal bilaterally.    Skin:  Psychiatric:   No bleeding, bruising or rash    Awake, alert and " oriented x 3, normal mood and affect           EKG on 10/13/21 @0526-      EKG on 10/13/21 @ 0014-      I personally viewed and interpreted the patient's EKG/Telemetry data    Assessment:  Active Hospital Problems    Diagnosis  POA   • Chest pain [R07.9]  Yes      Resolved Hospital Problems   No resolved problems to display.       Plan: This is a difficult situation.  She is recently had a cath a year ago that was essentially normal except for elevated LVEDP she comes in here short of breath chest pain troponins are negative proBNP is normal.  She is morbidly obese I think that is playing a big role in what is going on but that should give her chest pain and some of her symptoms sound like heart failure.  Her echo looks pretty good her right side is not enlarged LV function is normal there is no significant valvular disease.  We are in a tough situation noninvasive evaluation for her is going to be difficult challenge from an imaging standpoint and/or from an exercise standpoint I think she is also going to get significantly short of breath.  We may be in a situation where the best option is just to go back and do a left and right heart cath on her.  Check a D-dimer if that is normal then we can move forward with a left and right heart cath    Daniel Elmore MD  10/13/21  11:29 EDT.      Her D-dimer is come back negative and her inflammatory markers are mildly elevated but not specific I feel after long consideration probably we just need to move forward with a left and right heart cath on her we will get that set up today and if that is unrevealing then we will have the pulmonary service see her    Her cath cors are nl and R heart is good.  PCWP is 20 and LVEDP is 20.  Will add diuretic and work on diastolic dysfunction.  Also obesity is playing a roll

## 2021-10-14 LAB
ANION GAP SERPL CALCULATED.3IONS-SCNC: 10.7 MMOL/L (ref 5–15)
BASOPHILS # BLD AUTO: 0.07 10*3/MM3 (ref 0–0.2)
BASOPHILS NFR BLD AUTO: 0.9 % (ref 0–1.5)
BUN SERPL-MCNC: 24 MG/DL (ref 6–20)
BUN/CREAT SERPL: 30.8 (ref 7–25)
CALCIUM SPEC-SCNC: 8.7 MG/DL (ref 8.6–10.5)
CHLORIDE SERPL-SCNC: 101 MMOL/L (ref 98–107)
CO2 SERPL-SCNC: 25.3 MMOL/L (ref 22–29)
CREAT SERPL-MCNC: 0.78 MG/DL (ref 0.57–1)
DEPRECATED RDW RBC AUTO: 44.6 FL (ref 37–54)
EOSINOPHIL # BLD AUTO: 0.16 10*3/MM3 (ref 0–0.4)
EOSINOPHIL NFR BLD AUTO: 2 % (ref 0.3–6.2)
ERYTHROCYTE [DISTWIDTH] IN BLOOD BY AUTOMATED COUNT: 14.9 % (ref 12.3–15.4)
GFR SERPL CREATININE-BSD FRML MDRD: 77 ML/MIN/1.73
GLUCOSE BLDC GLUCOMTR-MCNC: 130 MG/DL (ref 70–130)
GLUCOSE BLDC GLUCOMTR-MCNC: 133 MG/DL (ref 70–130)
GLUCOSE BLDC GLUCOMTR-MCNC: 144 MG/DL (ref 70–130)
GLUCOSE BLDC GLUCOMTR-MCNC: 206 MG/DL (ref 70–130)
GLUCOSE SERPL-MCNC: 132 MG/DL (ref 65–99)
HCT VFR BLD AUTO: 32.5 % (ref 34–46.6)
HGB BLD-MCNC: 10.4 G/DL (ref 12–15.9)
IMM GRANULOCYTES # BLD AUTO: 0.07 10*3/MM3 (ref 0–0.05)
IMM GRANULOCYTES NFR BLD AUTO: 0.9 % (ref 0–0.5)
LYMPHOCYTES # BLD AUTO: 1.58 10*3/MM3 (ref 0.7–3.1)
LYMPHOCYTES NFR BLD AUTO: 19.8 % (ref 19.6–45.3)
MCH RBC QN AUTO: 26.2 PG (ref 26.6–33)
MCHC RBC AUTO-ENTMCNC: 32 G/DL (ref 31.5–35.7)
MCV RBC AUTO: 81.9 FL (ref 79–97)
MONOCYTES # BLD AUTO: 0.55 10*3/MM3 (ref 0.1–0.9)
MONOCYTES NFR BLD AUTO: 6.9 % (ref 5–12)
NEUTROPHILS NFR BLD AUTO: 5.57 10*3/MM3 (ref 1.7–7)
NEUTROPHILS NFR BLD AUTO: 69.5 % (ref 42.7–76)
NRBC BLD AUTO-RTO: 0 /100 WBC (ref 0–0.2)
PLATELET # BLD AUTO: 207 10*3/MM3 (ref 140–450)
PMV BLD AUTO: 11.7 FL (ref 6–12)
POTASSIUM SERPL-SCNC: 4.3 MMOL/L (ref 3.5–5.2)
QT INTERVAL: 351 MS
QT INTERVAL: 370 MS
RBC # BLD AUTO: 3.97 10*6/MM3 (ref 3.77–5.28)
SODIUM SERPL-SCNC: 137 MMOL/L (ref 136–145)
TROPONIN T SERPL-MCNC: <0.01 NG/ML (ref 0–0.03)
WBC # BLD AUTO: 8 10*3/MM3 (ref 3.4–10.8)

## 2021-10-14 PROCEDURE — G0378 HOSPITAL OBSERVATION PER HR: HCPCS

## 2021-10-14 PROCEDURE — 80048 BASIC METABOLIC PNL TOTAL CA: CPT | Performed by: INTERNAL MEDICINE

## 2021-10-14 PROCEDURE — 84484 ASSAY OF TROPONIN QUANT: CPT | Performed by: NURSE PRACTITIONER

## 2021-10-14 PROCEDURE — 94799 UNLISTED PULMONARY SVC/PX: CPT

## 2021-10-14 PROCEDURE — 99213 OFFICE O/P EST LOW 20 MIN: CPT | Performed by: INTERNAL MEDICINE

## 2021-10-14 PROCEDURE — 93005 ELECTROCARDIOGRAM TRACING: CPT | Performed by: INTERNAL MEDICINE

## 2021-10-14 PROCEDURE — 93010 ELECTROCARDIOGRAM REPORT: CPT | Performed by: INTERNAL MEDICINE

## 2021-10-14 PROCEDURE — 82962 GLUCOSE BLOOD TEST: CPT

## 2021-10-14 PROCEDURE — 94660 CPAP INITIATION&MGMT: CPT

## 2021-10-14 PROCEDURE — 25010000002 MORPHINE PER 10 MG: Performed by: INTERNAL MEDICINE

## 2021-10-14 PROCEDURE — 63710000001 INSULIN LISPRO (HUMAN) PER 5 UNITS: Performed by: INTERNAL MEDICINE

## 2021-10-14 PROCEDURE — 90791 PSYCH DIAGNOSTIC EVALUATION: CPT

## 2021-10-14 PROCEDURE — 85025 COMPLETE CBC W/AUTO DIFF WBC: CPT | Performed by: INTERNAL MEDICINE

## 2021-10-14 RX ORDER — SODIUM CHLORIDE 9 MG/ML
75 INJECTION, SOLUTION INTRAVENOUS CONTINUOUS
Status: DISCONTINUED | OUTPATIENT
Start: 2021-10-14 | End: 2021-10-17 | Stop reason: HOSPADM

## 2021-10-14 RX ORDER — FENOFIBRATE 145 MG/1
145 TABLET, COATED ORAL DAILY
Status: DISCONTINUED | OUTPATIENT
Start: 2021-10-15 | End: 2021-10-17 | Stop reason: HOSPADM

## 2021-10-14 RX ADMIN — DOCUSATE SODIUM 50MG AND SENNOSIDES 8.6MG 2 TABLET: 8.6; 5 TABLET, FILM COATED ORAL at 20:36

## 2021-10-14 RX ADMIN — FENOFIBRATE 145 MG: 145 TABLET ORAL at 09:38

## 2021-10-14 RX ADMIN — MORPHINE SULFATE 1 MG: 2 INJECTION, SOLUTION INTRAMUSCULAR; INTRAVENOUS at 20:36

## 2021-10-14 RX ADMIN — NITROGLYCERIN 0.4 MG: 0.4 TABLET SUBLINGUAL at 04:04

## 2021-10-14 RX ADMIN — FUROSEMIDE 40 MG: 40 TABLET ORAL at 09:39

## 2021-10-14 RX ADMIN — GABAPENTIN 600 MG: 300 CAPSULE ORAL at 05:51

## 2021-10-14 RX ADMIN — VILAZODONE HYDROCHLORIDE 40 MG: 40 TABLET ORAL at 09:39

## 2021-10-14 RX ADMIN — MORPHINE SULFATE 1 MG: 2 INJECTION, SOLUTION INTRAMUSCULAR; INTRAVENOUS at 10:26

## 2021-10-14 RX ADMIN — CLONAZEPAM 1 MG: 1 TABLET ORAL at 09:38

## 2021-10-14 RX ADMIN — SODIUM CHLORIDE, PRESERVATIVE FREE 10 ML: 5 INJECTION INTRAVENOUS at 20:36

## 2021-10-14 RX ADMIN — GABAPENTIN 600 MG: 300 CAPSULE ORAL at 22:18

## 2021-10-14 RX ADMIN — CLONAZEPAM 1 MG: 1 TABLET ORAL at 17:02

## 2021-10-14 RX ADMIN — SODIUM CHLORIDE, PRESERVATIVE FREE 10 ML: 5 INJECTION INTRAVENOUS at 09:40

## 2021-10-14 RX ADMIN — POTASSIUM CHLORIDE 10 MEQ: 750 TABLET, EXTENDED RELEASE ORAL at 09:39

## 2021-10-14 RX ADMIN — DOCUSATE SODIUM 50MG AND SENNOSIDES 8.6MG 2 TABLET: 8.6; 5 TABLET, FILM COATED ORAL at 09:38

## 2021-10-14 RX ADMIN — METOPROLOL TARTRATE 25 MG: 25 TABLET, FILM COATED ORAL at 09:38

## 2021-10-14 RX ADMIN — CETIRIZINE HYDROCHLORIDE 10 MG: 10 TABLET ORAL at 09:40

## 2021-10-14 RX ADMIN — HYDROCODONE BITARTRATE AND ACETAMINOPHEN 1 TABLET: 5; 325 TABLET ORAL at 00:20

## 2021-10-14 RX ADMIN — ATORVASTATIN CALCIUM 20 MG: 20 TABLET, FILM COATED ORAL at 00:20

## 2021-10-14 RX ADMIN — METOPROLOL TARTRATE 25 MG: 25 TABLET, FILM COATED ORAL at 20:35

## 2021-10-14 RX ADMIN — INSULIN LISPRO 4 UNITS: 100 INJECTION, SOLUTION INTRAVENOUS; SUBCUTANEOUS at 17:02

## 2021-10-14 RX ADMIN — MORPHINE SULFATE 1 MG: 2 INJECTION, SOLUTION INTRAMUSCULAR; INTRAVENOUS at 14:46

## 2021-10-14 RX ADMIN — CLONAZEPAM 1 MG: 1 TABLET ORAL at 20:35

## 2021-10-14 RX ADMIN — SODIUM CHLORIDE 75 ML/HR: 9 INJECTION, SOLUTION INTRAVENOUS at 15:40

## 2021-10-14 RX ADMIN — MORPHINE SULFATE 1 MG: 2 INJECTION, SOLUTION INTRAMUSCULAR; INTRAVENOUS at 04:43

## 2021-10-14 RX ADMIN — NITROGLYCERIN 0.4 MG: 0.4 TABLET SUBLINGUAL at 04:16

## 2021-10-14 RX ADMIN — ASPIRIN 81 MG: 81 TABLET, COATED ORAL at 09:39

## 2021-10-14 RX ADMIN — GABAPENTIN 600 MG: 300 CAPSULE ORAL at 14:31

## 2021-10-14 RX ADMIN — TEMAZEPAM 30 MG: 15 CAPSULE ORAL at 20:35

## 2021-10-14 NOTE — NURSING NOTE
Pt complaining of left chest pain/shoulder pain. PRN Nitro given x 1 dose with little relief at 0404. Second dose given at 0414. Pain now secluded to left shoulder area. /51. discussed with Lucy FRAIRE. No stroke like symptoms noted. Will obtain EKG, troponin and administer morphine for pain.

## 2021-10-14 NOTE — CASE MANAGEMENT/SOCIAL WORK
Discharge Planning Assessment  [facility]     Patient Name: Travis Yancey  MRN: [id number]  Today's Date: 10/14/2021    Admit Date: 10/13/2021     Discharge Needs Assessment     Row Name 10/14/21 0943       Living Environment    Lives With parent(s)    Current Living Arrangements home/apartment/condo    Potentially Unsafe Housing Conditions other (see comments)  no concerns    Primary Care Provided by self    Provides Primary Care For no one    Family Caregiver if Needed child(chelo), adult    Quality of Family Relationships helpful; involved; supportive    Able to Return to Prior Arrangements yes       Resource/Environmental Concerns    Resource/Environmental Concerns none    Transportation Concerns car, none       Transition Planning    Patient/Family Anticipates Transition to home with family    Patient/Family Anticipated Services at Transition none    Transportation Anticipated family or friend will provide       Discharge Needs Assessment    Readmission Within the Last 30 Days no previous admission in last 30 days    Equipment Currently Used at Home cane, straight; walker, rolling    Concerns to be Addressed no discharge needs identified; denies needs/concerns at this time    Anticipated Changes Related to Illness none    Equipment Needed After Discharge none               Discharge Plan     Row Name 10/14/21 0944       Plan    Plan Home with family; follow for 02 needs    Patient/Family in Agreement with Plan yes    Plan Comments CCP met with patient at bedside. CCP role explained and discharge planning discussed. Face sheet verified and BEJARANO noted. Patient’s PCP is Dr. Harmon. Patient lives with her father, with two steps to the entrance of her single story home. Patient uses a cane for mobility but does have a walker if needed. Patient has been to [facility] for SNF and has had home health through [facility]. Patient does not feel like home health is needed and plans to return home at discharge. Patient states she  needs oxygen at home. CCP discussed patient will need to qualify for home 02 in order for insurance to cover it; patient verbalized understanding and did not have DME preference. CCP left voicemail for [facility] incase 02 is needed at discharge. Will need walking oximetry prior to d/c-RN aware. Patient has transportation home. CCP will follow for 02 needs and assist as needed. Tresa RAMOS              Continued Care and Services - Admitted Since 10/13/2021    Coordination has not been started for this encounter.          Demographic Summary     Row Name 10/14/21 0942       General Information    Admission Type inpatient    Arrived From emergency department    Referral Source admission list    Reason for Consult discharge planning    Preferred Language English     Used During This Interaction no               Functional Status     Row Name 10/14/21 0943       Functional Status    Usual Activity Tolerance good    Current Activity Tolerance good       Functional Status, IADL    Medications assistive equipment    Meal Preparation assistive equipment    Housekeeping assistive equipment    Laundry assistive equipment    Shopping assistive equipment       Mental Status    General Appearance WDL WDL       Mental Status Summary    Recent Changes in Mental Status/Cognitive Functioning no changes               Psychosocial    No documentation.                Abuse/Neglect    No documentation.                Legal    No documentation.                Substance Abuse    No documentation.                Patient Forms    No documentation.                   RACHEL Hopper
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
## 2021-10-14 NOTE — PROGRESS NOTES
"Travis Yancey  1965 55 y.o.  [id number]      Patient Care Team:  Jeferson Harmon MD as PCP - General (Family Medicine)    CC: Obesity, shortness of breath, mild diastolic dysfunction    Interval History: Slept well with CPAP last night      Objective   Vital Signs  Temp:  [97.7 °F (36.5 °C)-98.8 °F (37.1 °C)] 98.5 °F (36.9 °C)  Heart Rate:  [] 96  Resp:  [8-22] 20  BP: ()/(51-93) 112/67    Intake/Output Summary (Last 24 hours) at 10/14/2021 0756  Last data filed at 10/14/2021 0602  Gross per 24 hour   Intake 830 ml   Output --   Net 830 ml     Flowsheet Rows      First Filed Value   Admission Height 165.1 cm (65\") Documented at 10/13/2021 0053   Admission Weight 107 kg (236 lb 6.4 oz) Documented at 10/13/2021 0053          Physical Exam:   General Appearance:    Alert,oriented, in no acute distress   Lungs:     Clear to auscultation,BS are equal    Heart:    Normal S1 and S2, RRR without murmur, gallop or rub   HEENT:    Sclerae are clear, no JVD or adenopathy   Abdomen:     Normal bowel sounds, soft nontender, nondistended, no HSM   Extremities:   Moves all extremities well, no edema, no cyanosis, no             Redness, no rash     Medication Review:      aspirin, 81 mg, Oral, Daily  atorvastatin, 20 mg, Oral, Nightly  cetirizine, 10 mg, Oral, Daily  clonazePAM, 1 mg, Oral, TID  famotidine, 20 mg, Oral, BID AC  fenofibrate, 145 mg, Oral, Daily  furosemide, 40 mg, Oral, Daily  gabapentin, 600 mg, Oral, Q8H  insulin lispro, 0-9 Units, Subcutaneous, TID AC  metoprolol tartrate, 25 mg, Oral, BID  potassium chloride, 10 mEq, Oral, Daily  senna-docusate sodium, 2 tablet, Oral, BID  sodium chloride, 10 mL, Intravenous, Q12H  temazepam, 30 mg, Oral, Nightly  vilazodone, 40 mg, Oral, Daily             I reviewed the patient's new clinical results.  I personally viewed and interpreted the patient's EKG/Telemetry data    Assessment/Plan  Active Hospital Problems    Diagnosis  POA   • Chest pain " [R07.9]  Yes   • Precordial chest pain [R07.2]  Yes   • Bipolar disorder (HCC) [F31.9]  Yes   • Depression [F32.A]  Yes   • Fibromyalgia [M79.7]  Yes   • Esophageal reflux [K21.9]  Yes   • Hyperlipidemia [E78.5]  Yes   • Chronic low back pain [M54.50, G89.29]  Yes   • Anxiety [F41.9]  Yes   • Hypertension [I10]  Yes      Resolved Hospital Problems   No resolved problems to display.       Pretty profound shortness of breath we did a left and right heart cath on her coronaries are normal LV function is normal LVEDP is mildly elevated at 20 and her wedge was about 20 with the rest of her pulmonary pressures were normal.  She may have an element of mild diastolic heart failure she does not have much in the way of blood pressure we have her on a low-dose of tartrate and diuretic will continue with that I think we need to have pulmonary see her get her set up for a sleep study and then she can be discharged today from my standpoint I would like her to see one of my nurse practitioners Malorie or Mary in 1 to 2 weeks and then see me in about 4-6 weeks I do think that weight loss is a big thing for her although it is going to be difficult    Daniel Elmore MD  10/14/21  07:56 EDT

## 2021-10-14 NOTE — PROGRESS NOTES
Name: Travis Yancey ADMIT: 10/13/2021   : 1965  PCP: Jeferson Harmon MD    MRN: [id number] LOS: 1 days   AGE/SEX: 55 y.o. female  ROOM: [facility]     Subjective   Subjective   Slept well with cpap last night. She anxious to get a sleep study/speak with pulmonology. She reports that she slept well with the cpap last night. She is complaining of some pain at her right groin site/right abdomen/panus.      Objective   Objective   Vital Signs  Temp:  [97.7 °F (36.5 °C)-98.8 °F (37.1 °C)] 97.7 °F (36.5 °C)  Heart Rate:  [] 90  Resp:  [8-22] 22  BP: ()/(51-81) 123/64  SpO2:  [92 %-99 %] 99 %  on  Flow (L/min):  [2] 2;   Device (Oxygen Therapy): nasal cannula  Body mass index is 42.17 kg/m².  Physical Exam  Constitutional:       General: She is not in acute distress.     Appearance: She is obese.   Cardiovascular:      Rate and Rhythm: Normal rate and regular rhythm.      Heart sounds: Normal heart sounds.   Pulmonary:      Effort: Pulmonary effort is normal.      Breath sounds: Normal breath sounds.   Abdominal:      General: Bowel sounds are normal. There is no distension.      Palpations: Abdomen is soft. There is no mass.      Tenderness: There is abdominal tenderness. There is no guarding or rebound.      Comments: Some tenderness to palpation under right panus. No swelling, bruising, induration    Genitourinary:     Comments: Cath site is bandaged withg some tenderness but no or bruising or a bruit  Musculoskeletal:         General: No tenderness.      Right lower leg: No edema.      Left lower leg: No edema.   Neurological:      Mental Status: She is alert.   Psychiatric:         Mood and Affect: Mood normal.         Behavior: Behavior normal.         Results Review     I reviewed the patient's new clinical results.  Results from last 7 days   Lab Units 10/14/21  0515 10/13/21  1604 10/13/21  1539 10/13/21  0107   WBC 10*3/mm3 8.00  --   --  9.44   HEMOGLOBIN g/dL 10.4*  --   --  11.9*    HEMOGLOBIN, POC g/dL  --  11.9* 12.2  --    PLATELETS 10*3/mm3 207  --   --  239     Results from last 7 days   Lab Units 10/14/21  0515 10/13/21  0107   SODIUM mmol/L 137 140   POTASSIUM mmol/L 4.3 4.3   CHLORIDE mmol/L 101 102   CO2 mmol/L 25.3 25.5   BUN mg/dL 24* 26*   CREATININE mg/dL 0.78 1.18*   GLUCOSE mg/dL 132* 119*   Estimated Creatinine Clearance: 105.6 mL/min (by C-G formula based on SCr of 0.78 mg/dL).  Results from last 7 days   Lab Units 10/13/21  0107   ALBUMIN g/dL 4.70   BILIRUBIN mg/dL 0.2   ALK PHOS U/L 61   AST (SGOT) U/L 47*   ALT (SGPT) U/L 44*     Results from last 7 days   Lab Units 10/14/21  0515 10/13/21  0107   CALCIUM mg/dL 8.7 9.5   ALBUMIN g/dL  --  4.70       COVID19   Date Value Ref Range Status   10/13/2021 Not Detected Not Detected - Ref. Range Final     Glucose   Date/Time Value Ref Range Status   10/14/2021 1106 133 (H) 70 - 130 mg/dL Final     Comment:     Meter: [id number] : [id number] [place] NA   10/14/2021 0643 144 (H) 70 - 130 mg/dL Final     Comment:     Meter: [id number] : [id number] Garry Elizabeth NA   10/13/2021 1951 112 70 - 130 mg/dL Final     Comment:     Meter: [id number] : [id number] Nima Astorga NA   10/13/2021 1107 103 70 - 130 mg/dL Final     Comment:     Meter: [id number] : [id number] Bo FAIR       Cardiac Catheterization/Vascular Study  1. Normal coronary arteries  2. Normal pulmonary pressures  3. Elevated left ventricular filling pressures  Adult Transthoracic Echo Complete W/ Cont if Necessary Per Protocol  · The study is technically difficult for diagnosis.  · Left ventricular wall thickness is consistent with mild concentric   hypertrophy.  · Estimated left ventricular EF = 60% Left ventricular systolic function   is normal.  · Left ventricular diastolic function was indeterminate.     XR Chest 1 View  Narrative: Patient: DESIRAE MAHONEY  Time Out: 00:50  Exam(s): FILM CXR 1 VIEW     EXAM:    XR Chest, 1 View    CLINICAL  HISTORY:     Reason for exam: soa.    TECHNIQUE:    Frontal view of the chest.    COMPARISON:    No relevant prior studies available.    FINDINGS:    Lungs:  No significant abnormality.  No consolidation.    Pleural space:  No significant abnormality.  No pneumothorax.    Heart:  No significant abnormality.  No cardiomegaly.    Mediastinum:  No significant abnormality.    Bones joints:  No acute osseous abnormality.    IMPRESSION:         No acute cardiopulmonary process.  Impression: Electronically signed by Barbra Aj MD on 10-13-21 at 0050    Scheduled Medications  aspirin, 81 mg, Oral, Daily  atorvastatin, 20 mg, Oral, Nightly  cetirizine, 10 mg, Oral, Daily  clonazePAM, 1 mg, Oral, TID  famotidine, 20 mg, Oral, BID AC  fenofibrate, 145 mg, Oral, Daily  furosemide, 40 mg, Oral, Daily  gabapentin, 600 mg, Oral, Q8H  insulin lispro, 0-9 Units, Subcutaneous, TID AC  metoprolol tartrate, 25 mg, Oral, BID  potassium chloride, 10 mEq, Oral, Daily  senna-docusate sodium, 2 tablet, Oral, BID  sodium chloride, 10 mL, Intravenous, Q12H  temazepam, 30 mg, Oral, Nightly  vilazodone, 40 mg, Oral, Daily    Infusions   Diet  Diet Regular; Consistent Carbohydrate       Assessment/Plan     Active Hospital Problems    Diagnosis  POA   • **Chest pain [R07.9]  Yes   • Precordial chest pain [R07.2]  Yes   • Bipolar disorder (HCC) [F31.9]  Yes   • Depression [F32.A]  Yes   • Fibromyalgia [M79.7]  Yes   • Esophageal reflux [K21.9]  Yes   • Hyperlipidemia [E78.5]  Yes   • Chronic low back pain [M54.50, G89.29]  Yes   • Anxiety [F41.9]  Yes   • Hypertension [I10]  Yes      Resolved Hospital Problems   No resolved problems to display.       55 y.o. female admitted with Chest pain.    Precordial chest pain/ SOA  -cardiac catheterization showed normal coronaries  -chest x-ray was negative  -she is on 1L NC   Groin pain at cath site, right lower panus abdominal pain  -no tenderness, bruising, bruit at cath site  -mild tenderness  "without induration, bruising, mass under right panus  -hemoglobin is 10.4 down from 12 around the time of the catheterization  -discussed with radiology. Will start some IVF for renal protection and check a CT abdomen/pelvis with contrast  -asked RN to alert cardiology  Probable BREE with obesity hypoventilation  -pt having difficulty obtaining outpatient sleep study  -she feels like she is benefiting from CPAP  -pulmonology is consulted.  Fibromyalgia, chronic pain, arthritis pain\"n every joint of her body\"/ Chronic  low back pain  -Patient may benefit from going and seeing an outpatient pain management specialist  -On Neurontin 3 times daily  Hypertension  -Stable, continue medical management  Hyperlipidemia-mostly hypertriglyceridemia  -pt is on fenofibrate micronized (LOFIBRA) 134 as an outpt  Depression/ Bipolar disorder / ADD/ Anxiety  -Patient is on Neurontin, temazepam, trazodone, and Viibryd   -Holding the Adderall  Esophageal reflux  -pepcid  SCDs for DVT prophylaxis.  Full code.  Discussed with patient and nursing staff.  Anticipate discharge home in 1-2 days.       Rudy Wilson MD  [facility]  10/14/21  15:12 EDT    I wore protective equipment throughout this patient encounter including a face mask, gloves and protective eyewear.  Hand hygiene was performed before donning protective equipment and after removal when leaving the room.      "

## 2021-10-14 NOTE — PLAN OF CARE
Goal Outcome Evaluation:              Outcome Summary: VSS. Pt complained of pain most of the day in left shoulder, back, and neck. Treated twice with IV morphine. Pt also complained of abdominal pain around femoral site from heart cath. MD notified. Preapred for CT of abdomen with contrast. IV fluids started for CT. No other compaints, will continue to monitor.

## 2021-10-15 ENCOUNTER — APPOINTMENT (OUTPATIENT)
Dept: CT IMAGING | Facility: HOSPITAL | Age: 56
End: 2021-10-15

## 2021-10-15 ENCOUNTER — APPOINTMENT (OUTPATIENT)
Dept: GENERAL RADIOLOGY | Facility: HOSPITAL | Age: 56
End: 2021-10-15

## 2021-10-15 LAB
ANION GAP SERPL CALCULATED.3IONS-SCNC: 11.5 MMOL/L (ref 5–15)
BASOPHILS # BLD AUTO: 0.06 10*3/MM3 (ref 0–0.2)
BASOPHILS NFR BLD AUTO: 1 % (ref 0–1.5)
BUN SERPL-MCNC: 17 MG/DL (ref 6–20)
BUN/CREAT SERPL: 25.4 (ref 7–25)
CALCIUM SPEC-SCNC: 8.5 MG/DL (ref 8.6–10.5)
CHLORIDE SERPL-SCNC: 102 MMOL/L (ref 98–107)
CO2 SERPL-SCNC: 24.5 MMOL/L (ref 22–29)
CREAT SERPL-MCNC: 0.67 MG/DL (ref 0.57–1)
DEPRECATED RDW RBC AUTO: 42.2 FL (ref 37–54)
EOSINOPHIL # BLD AUTO: 0.14 10*3/MM3 (ref 0–0.4)
EOSINOPHIL NFR BLD AUTO: 2.4 % (ref 0.3–6.2)
ERYTHROCYTE [DISTWIDTH] IN BLOOD BY AUTOMATED COUNT: 14.4 % (ref 12.3–15.4)
GFR SERPL CREATININE-BSD FRML MDRD: 92 ML/MIN/1.73
GLUCOSE BLDC GLUCOMTR-MCNC: 121 MG/DL (ref 70–130)
GLUCOSE BLDC GLUCOMTR-MCNC: 129 MG/DL (ref 70–130)
GLUCOSE BLDC GLUCOMTR-MCNC: 133 MG/DL (ref 70–130)
GLUCOSE BLDC GLUCOMTR-MCNC: 236 MG/DL (ref 70–130)
GLUCOSE SERPL-MCNC: 139 MG/DL (ref 65–99)
HCT VFR BLD AUTO: 30.2 % (ref 34–46.6)
HGB BLD-MCNC: 9.8 G/DL (ref 12–15.9)
IMM GRANULOCYTES # BLD AUTO: 0.07 10*3/MM3 (ref 0–0.05)
IMM GRANULOCYTES NFR BLD AUTO: 1.2 % (ref 0–0.5)
LYMPHOCYTES # BLD AUTO: 1.87 10*3/MM3 (ref 0.7–3.1)
LYMPHOCYTES NFR BLD AUTO: 31.4 % (ref 19.6–45.3)
MCH RBC QN AUTO: 26.1 PG (ref 26.6–33)
MCHC RBC AUTO-ENTMCNC: 32.5 G/DL (ref 31.5–35.7)
MCV RBC AUTO: 80.3 FL (ref 79–97)
MONOCYTES # BLD AUTO: 0.43 10*3/MM3 (ref 0.1–0.9)
MONOCYTES NFR BLD AUTO: 7.2 % (ref 5–12)
NEUTROPHILS NFR BLD AUTO: 3.38 10*3/MM3 (ref 1.7–7)
NEUTROPHILS NFR BLD AUTO: 56.8 % (ref 42.7–76)
NRBC BLD AUTO-RTO: 0 /100 WBC (ref 0–0.2)
PLATELET # BLD AUTO: 190 10*3/MM3 (ref 140–450)
PMV BLD AUTO: 11.6 FL (ref 6–12)
POTASSIUM SERPL-SCNC: 4.7 MMOL/L (ref 3.5–5.2)
RBC # BLD AUTO: 3.76 10*6/MM3 (ref 3.77–5.28)
SODIUM SERPL-SCNC: 138 MMOL/L (ref 136–145)
WBC # BLD AUTO: 5.95 10*3/MM3 (ref 3.4–10.8)

## 2021-10-15 PROCEDURE — 94799 UNLISTED PULMONARY SVC/PX: CPT

## 2021-10-15 PROCEDURE — 99203 OFFICE O/P NEW LOW 30 MIN: CPT | Performed by: NURSE PRACTITIONER

## 2021-10-15 PROCEDURE — 25010000002 MORPHINE PER 10 MG: Performed by: INTERNAL MEDICINE

## 2021-10-15 PROCEDURE — 72020 X-RAY EXAM OF SPINE 1 VIEW: CPT

## 2021-10-15 PROCEDURE — 80048 BASIC METABOLIC PNL TOTAL CA: CPT | Performed by: INTERNAL MEDICINE

## 2021-10-15 PROCEDURE — 25010000002 IOPAMIDOL 61 % SOLUTION: Performed by: STUDENT IN AN ORGANIZED HEALTH CARE EDUCATION/TRAINING PROGRAM

## 2021-10-15 PROCEDURE — G0378 HOSPITAL OBSERVATION PER HR: HCPCS

## 2021-10-15 PROCEDURE — 85025 COMPLETE CBC W/AUTO DIFF WBC: CPT | Performed by: INTERNAL MEDICINE

## 2021-10-15 PROCEDURE — 72040 X-RAY EXAM NECK SPINE 2-3 VW: CPT

## 2021-10-15 PROCEDURE — 94618 PULMONARY STRESS TESTING: CPT

## 2021-10-15 PROCEDURE — 74177 CT ABD & PELVIS W/CONTRAST: CPT

## 2021-10-15 PROCEDURE — 82962 GLUCOSE BLOOD TEST: CPT

## 2021-10-15 PROCEDURE — 73030 X-RAY EXAM OF SHOULDER: CPT

## 2021-10-15 PROCEDURE — 63710000001 INSULIN LISPRO (HUMAN) PER 5 UNITS: Performed by: INTERNAL MEDICINE

## 2021-10-15 RX ORDER — FLUTICASONE PROPIONATE 50 MCG
2 SPRAY, SUSPENSION (ML) NASAL DAILY
Qty: 48 G | Refills: 3 | Status: SHIPPED | OUTPATIENT
Start: 2021-10-15

## 2021-10-15 RX ADMIN — CLONAZEPAM 1 MG: 1 TABLET ORAL at 21:40

## 2021-10-15 RX ADMIN — MORPHINE SULFATE 1 MG: 2 INJECTION, SOLUTION INTRAMUSCULAR; INTRAVENOUS at 21:38

## 2021-10-15 RX ADMIN — DOCUSATE SODIUM 50MG AND SENNOSIDES 8.6MG 2 TABLET: 8.6; 5 TABLET, FILM COATED ORAL at 08:05

## 2021-10-15 RX ADMIN — GABAPENTIN 600 MG: 300 CAPSULE ORAL at 15:32

## 2021-10-15 RX ADMIN — FAMOTIDINE 20 MG: 20 TABLET, FILM COATED ORAL at 16:43

## 2021-10-15 RX ADMIN — SODIUM CHLORIDE, PRESERVATIVE FREE 10 ML: 5 INJECTION INTRAVENOUS at 21:39

## 2021-10-15 RX ADMIN — POTASSIUM CHLORIDE 10 MEQ: 750 TABLET, EXTENDED RELEASE ORAL at 08:06

## 2021-10-15 RX ADMIN — METOPROLOL TARTRATE 25 MG: 25 TABLET, FILM COATED ORAL at 08:06

## 2021-10-15 RX ADMIN — VILAZODONE HYDROCHLORIDE 40 MG: 40 TABLET ORAL at 08:05

## 2021-10-15 RX ADMIN — TEMAZEPAM 30 MG: 15 CAPSULE ORAL at 21:39

## 2021-10-15 RX ADMIN — IOPAMIDOL 100 ML: 612 INJECTION, SOLUTION INTRAVENOUS at 13:44

## 2021-10-15 RX ADMIN — GABAPENTIN 600 MG: 300 CAPSULE ORAL at 05:29

## 2021-10-15 RX ADMIN — METOPROLOL TARTRATE 25 MG: 25 TABLET, FILM COATED ORAL at 21:39

## 2021-10-15 RX ADMIN — SODIUM CHLORIDE 75 ML/HR: 9 INJECTION, SOLUTION INTRAVENOUS at 23:41

## 2021-10-15 RX ADMIN — MORPHINE SULFATE 1 MG: 2 INJECTION, SOLUTION INTRAMUSCULAR; INTRAVENOUS at 15:31

## 2021-10-15 RX ADMIN — HYDROCODONE BITARTRATE AND ACETAMINOPHEN 1 TABLET: 5; 325 TABLET ORAL at 05:29

## 2021-10-15 RX ADMIN — CETIRIZINE HYDROCHLORIDE 10 MG: 10 TABLET ORAL at 08:06

## 2021-10-15 RX ADMIN — CLONAZEPAM 1 MG: 1 TABLET ORAL at 16:43

## 2021-10-15 RX ADMIN — INSULIN LISPRO 4 UNITS: 100 INJECTION, SOLUTION INTRAVENOUS; SUBCUTANEOUS at 12:03

## 2021-10-15 RX ADMIN — FENOFIBRATE 145 MG: 145 TABLET ORAL at 08:05

## 2021-10-15 RX ADMIN — DOCUSATE SODIUM 50MG AND SENNOSIDES 8.6MG 2 TABLET: 8.6; 5 TABLET, FILM COATED ORAL at 21:39

## 2021-10-15 RX ADMIN — FAMOTIDINE 20 MG: 20 TABLET, FILM COATED ORAL at 08:05

## 2021-10-15 RX ADMIN — GABAPENTIN 600 MG: 300 CAPSULE ORAL at 21:39

## 2021-10-15 RX ADMIN — SODIUM CHLORIDE, PRESERVATIVE FREE 10 ML: 5 INJECTION INTRAVENOUS at 08:06

## 2021-10-15 RX ADMIN — CLONAZEPAM 1 MG: 1 TABLET ORAL at 08:05

## 2021-10-15 RX ADMIN — MORPHINE SULFATE 1 MG: 2 INJECTION, SOLUTION INTRAMUSCULAR; INTRAVENOUS at 02:20

## 2021-10-15 RX ADMIN — SODIUM CHLORIDE 75 ML/HR: 9 INJECTION, SOLUTION INTRAVENOUS at 08:05

## 2021-10-15 RX ADMIN — HYDROCODONE BITARTRATE AND ACETAMINOPHEN 1 TABLET: 5; 325 TABLET ORAL at 23:34

## 2021-10-15 RX ADMIN — FUROSEMIDE 40 MG: 40 TABLET ORAL at 08:06

## 2021-10-15 RX ADMIN — MORPHINE SULFATE 1 MG: 2 INJECTION, SOLUTION INTRAMUSCULAR; INTRAVENOUS at 09:55

## 2021-10-15 NOTE — PLAN OF CARE
Goal Outcome Evaluation:           Progress: improving  Outcome Summary: VSS; PRN pain med for R groin and RLQ abd pain, Dr. Wilson is aware, CT is done; Xray for L shoulder, neurosurgery is consulted; walking ox is done, pulm to elvaluate for sleep apnea; continue to monitor.
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
## 2021-10-15 NOTE — CONSULTS
"               Referring Provider: Dr. Wilson  Reason for Consultation: Sleep apnea    Patient Care Team:  Jeferson Harmon MD as PCP - General (Family Medicine)    Chief complaint:   Chest pain    History of present illness:  Subjective   This is a 55-year-old female patient, non-smoker with no prior history of lung disease.  She has chronic pain in her back and takes benzodiazepine for anxiety.    She presented here for chest pain and had negative cardiac work-up.  We are consulted for evaluation of sleep apnea.    Patient reported loud snoring for many years.  Snoring was described as a \"freight train\".  Her snoring is very loud and people could not sleep in the same room or even in a prone close to her.  She stated that her daughter who is a nurse at [employer], has urged her several times to get evaluated for sleep apnea.      She does wake up up to 6 times at night, choking and gasping for breath and also reported waking up to urinate.  She wakes up with a dry mouth and sometimes headache.  She feels sleepy during the day and dozes off frequently.    She stated that she has been gaining weight gradually due to lack of lability secondary to shortness of breath on activities and back pain.  She first gained weight in 2018 when her son committed suicide and she was depressed and stayed at home and gained about 65 pounds in 1 year.    Labs:  Serum bicarb 24; hemoglobin 9.8; WBC normal    Review of Systems  Constitutional: No fever or chills.   ENMT: No sinus congestion  Cardiovascular: No chest pain, palpitation but legs swelling.  Respiratory: She has dyspnea on exertion which he attributed to weight gain.  No cough or sputum production.  Gastrointestinal: No constipation, diarrhea or abdominal pain   Neurology: No headache, weakness, numbness or dizziness.   Musculoskeletal: Pain in back and joints.  Psychiatry: No depression.  Genitourinary: No dysuria or frequent urination  Endo: No weight " changes. No cold or warm intolerance.  Lymphatic: No swollen glands.  Integumentary: No rash.    History  Past Medical History:   Diagnosis Date   • ADHD (attention deficit hyperactivity disorder)    • Anxiety    • Arthritis    • Depression    • Diabetes mellitus (HCC)    • Fibromyalgia    • Hypertension    ,   Past Surgical History:   Procedure Laterality Date   • APPENDECTOMY     • CARDIAC CATHETERIZATION Left 10/13/2021    Procedure: RIGHT AND LEFT HEART CATH;  Surgeon: Jennifer Wilcox MD;  Location:  [facility] CATH INVASIVE LOCATION;  Service: Cardiology;  Laterality: Left;  right and left cardiac catheterization   • CARDIAC CATHETERIZATION N/A 10/13/2021    Procedure: Left ventriculography;  Surgeon: Jennifer Wilcox MD;  Location:  [facility] CATH INVASIVE LOCATION;  Service: Cardiology;  Laterality: N/A;   • CARDIAC CATHETERIZATION N/A 10/13/2021    Procedure: Coronary angiography;  Surgeon: Jennifer Wilcox MD;  Location:  [facility] CATH INVASIVE LOCATION;  Service: Cardiology;  Laterality: N/A;   • CERVICAL FUSION      c5-6   •  SECTION     • CHOLECYSTECTOMY     • DISCECTOMY POSTERIOR THORACIC TRANSPEDICULAR APPROACH     • PARTIAL HIP ARTHROPLASTY Right    , History reviewed. No pertinent family history.,   Social History     Socioeconomic History   • Marital status:    Tobacco Use   • Smoking status: Former Smoker     Types: Cigarettes     Quit date: 2019     Years since quittin.1   • Smokeless tobacco: Never Used   • Tobacco comment: Intermittent from age 22 on-- 2-3 pack/ week   Substance and Sexual Activity   • Alcohol use: Not Currently   • Drug use: Never       ,   Medications Prior to Admission   Medication Sig Dispense Refill Last Dose   • amphetamine-dextroamphetamine (ADDERALL) 30 MG tablet Take 1 tablet by mouth 2 (two) times a day.   10/12/2021 at Unknown time   • clonazePAM (KlonoPIN) 1 MG tablet Take 1 tablet by mouth 3 (Three) Times a Day.   10/12/2021 at Unknown time   •  fenofibrate micronized (LOFIBRA) 134 MG capsule Take 1 capsule by mouth Daily. 90 capsule 1 10/12/2021 at Unknown time   • furosemide (LASIX) 40 MG tablet Take 1 tablet by mouth Daily. 90 tablet 0 10/12/2021 at Unknown time   • gabapentin (NEURONTIN) 600 MG tablet Take 1 tablet by mouth 3 (Three) Times a Day.   10/12/2021 at Unknown time   • levocetirizine (XYZAL) 5 MG tablet Take 0.5 tablets by mouth Daily. 90 tablet 1 10/12/2021 at Unknown time   • metFORMIN (GLUCOPHAGE) 500 MG tablet Take 2 tablets by mouth 2 (two) times a day.   10/12/2021 at Unknown time   • metoprolol tartrate (LOPRESSOR) 25 MG tablet Take 1 tablet by mouth 2 (Two) Times a Day. 180 tablet 1 10/12/2021 at Unknown time   • pantoprazole (PROTONIX) 40 MG EC tablet Take 1 tablet by mouth Daily. 90 tablet 1 10/12/2021 at Unknown time   • potassium chloride (MICRO-K) 10 MEQ CR capsule Take 1 capsule by mouth Daily. (Patient taking differently: Take 10 mEq by mouth Daily. Take with lasix) 90 capsule 1 10/12/2021 at Unknown time   • temazepam (RESTORIL) 30 MG capsule Take 1 capsule by mouth Every Night.   10/11/2021 at Unknown time   • traZODone (DESYREL) 50 MG tablet Take 1 tablet by mouth At Night As Needed for Sleep.   10/11/2021 at Unknown time   • Viibryd 40 MG tablet tablet Take 1 tablet by mouth Daily.   10/12/2021 at Unknown time   • ondansetron (ZOFRAN) 4 MG tablet Take 4 mg by mouth Every 8 (Eight) Hours As Needed for Nausea.   More than a month at Unknown time   , Scheduled Meds:  cetirizine, 10 mg, Oral, Daily  clonazePAM, 1 mg, Oral, TID  famotidine, 20 mg, Oral, BID AC  fenofibrate, 145 mg, Oral, Daily  furosemide, 40 mg, Oral, Daily  gabapentin, 600 mg, Oral, Q8H  insulin lispro, 0-9 Units, Subcutaneous, TID AC  metoprolol tartrate, 25 mg, Oral, BID  potassium chloride, 10 mEq, Oral, Daily  senna-docusate sodium, 2 tablet, Oral, BID  sodium chloride, 10 mL, Intravenous, Q12H  temazepam, 30 mg, Oral, Nightly  vilazodone, 40 mg, Oral,  Daily    , Continuous Infusions:  sodium chloride, 75 mL/hr, Last Rate: 75 mL/hr (10/15/21 1711)     and Allergies:  Patient has no known allergies.    Objective     Vital Signs   Temp:  [98.2 °F (36.8 °C)-98.4 °F (36.9 °C)] 98.4 °F (36.9 °C)  Heart Rate:  [] 100  Resp:  [18-20] 18  BP: (121-134)/(67-74) 134/72    PPE used per hospital policy    Physical Exam:  Constitutional: Not in acute distress.  Eyes: Injected conjunctivae, EOMI. pupils equal reactive to light.  ENMT: Carreon and Mallampati 3. No oral thrush.  Moist tongue  Neck: Large. Trachea midline. No thyromegaly  Heart: RRR, no murmur  Lungs: Equal but diminished air entry throughout.  No wheezing.  Nonlabored breathing..  Non labored breathing.   Abdomen: Obese. Soft. No tenderness or dullness. No HSM.  Extremities: No cyanosis, clubbing or pitting edema.  Warm extremities and well-perfused.  Neuro: Conscious, alert, oriented x3.  Strength 5/5 in arms.  Psych: Appropriate mood and affect.    Integumentary: No rash.  Normal skin turgor  Lymphatic: No palpable cervical or supraclavicular lymph nodes.      Diagnostic imaging:  I personally and independently reviewed the following images:     CXR 10/13/2021: Clear    Assessment     1. Snoring and apnea highly suggestive of obstructive sleep apnea  2. Exertional hypoxemia: Probably secondary to obesity and restrictive lung disease from obesity and hypoventilation.  No parenchymal lung disease on imaging.  3. Morbid obesity, BMI 42    Comorbid conditions:  · Anxiety  · HTN  · DM type II    Recommendations:    Check PSG, split-night study. We discussed the pathophysiology of sleep apnea, testing and therapy which include CPAP and weight loss.  Patient is agreeable with CPAP therapy if needed.    Oxygen 2 L/min on exertion    Counseled for weight loss.  Encouraged to exercise regularly and cut down on carbohydrates.  Discussed that losing weight may decrease the severity of sleep apnea and obviate the  need of CPAP therapy.    Discussed the association between obstructive sleep apnea and cardiovascular disease and diabetes and the beneficial effect of Pap therapy in reducing the risk of major cardiovascular events.    Thank you for the consult.  We will sign off.  We will follow her as outpatient      Rajiv Awan MD  10/15/21  15:43 EDT

## 2021-10-15 NOTE — PLAN OF CARE
Goal Outcome Evaluation:  Plan of Care Reviewed With: patient        Progress: no change  Outcome Summary: VSS. C/o pain in R groin, cx and shoulder - relieved with PRN morphine. CT of abdomen still needed. IVF continued @ 75mL/hr for contrast w/ CT. BiPAP at night. Walking ox still needed. Cardiac cath dsgs C/D/I. No other changes. WCM

## 2021-10-15 NOTE — SIGNIFICANT NOTE
Exercise Oximetry    Patient Name:Travis Yancey   MRN: [id number]   Date: 10/15/21             ROOM AIR BASELINE   SpO2%    96   Heart Rate    95   Blood Pressure      EXERCISE ON ROOM AIR SpO2% EXERCISE ON O2 @   2  LPM SpO2%   1 MINUTE    94 1 MINUTE       2 MINUTES    97 2 MINUTES        3 MINUTES    91 3 MINUTES    4 MINUTES    88 4 MINUTES    5 MINUTES  5 MINUTES     96   6 MINUTES  6 MINUTES     98              Distance Walked   Distance Walked   Dyspnea (Simon Scale)   Dyspnea (Simon Scale)   Fatigue (Simon Scale)   Fatigue (Simon Scale)   SpO2% Post Exercise     98 SpO2% Post Exercise   HR Post Exercise     94 HR Post Exercise   Time to Recovery     2 minutes Time to Recovery     Comments:   Pt began to complain about being tired and dizzy just before sats drop to 88% on room air.  We stopped, pt held on to handrail and I applied 2 liters oxygen therapy. Paused long enough for pt to recover. Asked her if she felt she could make it back to her room because she had broke out in a sweat as well.  Her room was just a minute away and she stated she could.   Recommend home oxygen use of 2 liters while ambulating. At rest pt could remove oxygen.

## 2021-10-15 NOTE — PROGRESS NOTES
Name: Travis Yancey ADMIT: 10/13/2021   : 1965  PCP: Jeferson Harmon MD    MRN: [id number] LOS: 1 days   AGE/SEX: 55 y.o. female  ROOM: [facility]     Subjective   Subjective     Still complaining of right groin pain and right lower abdominal pain. Transport is here to take her for her CT abdomen/pelvis. She is also complaining of left shoulder pain and cervical pain. She thinks that maybe her pain is related to her chronic cervical pain since her cardiac testing has been unremarkable. She has a history of a cervical fusion, and she asks if I can ask neurosurgery to see her since she is no longer established with a neurosurgeon and she doesn't have one where she lives.       Objective   Objective   Vital Signs  Temp:  [97.7 °F (36.5 °C)-98.3 °F (36.8 °C)] 98.3 °F (36.8 °C)  Heart Rate:  [82-90] 82  Resp:  [18-22] 18  BP: (121-129)/(64-74) 129/72  SpO2:  [95 %-98 %] 95 %  on  Flow (L/min):  [2] 2;   Device (Oxygen Therapy): room air  Body mass index is 42.15 kg/m².  Physical Exam  Constitutional:       General: She is not in acute distress.     Appearance: She is obese.   Cardiovascular:      Rate and Rhythm: Normal rate and regular rhythm.      Heart sounds: Normal heart sounds.   Pulmonary:      Effort: Pulmonary effort is normal.      Breath sounds: Normal breath sounds.   Abdominal:      General: Bowel sounds are normal. There is no distension.      Palpations: Abdomen is soft. There is no mass.      Tenderness: There is abdominal tenderness. There is no guarding or rebound.      Comments: Some tenderness to palpation under right panus. No swelling, bruising, induration    Genitourinary:     Comments: Cath site is bandaged with some tenderness but no or bruising or a bruit  Musculoskeletal:         General: No tenderness.      Right lower leg: No edema.      Left lower leg: No edema.   Neurological:      Mental Status: She is alert.   Psychiatric:         Mood and Affect: Mood normal.          Behavior: Behavior normal.         Results Review     I reviewed the patient's new clinical results.  Results from last 7 days   Lab Units 10/15/21  0636 10/14/21  0515 10/13/21  1604 10/13/21  1539 10/13/21  0107   WBC 10*3/mm3 5.95 8.00  --   --  9.44   HEMOGLOBIN g/dL 9.8* 10.4*  --   --  11.9*   HEMOGLOBIN, POC g/dL  --   --  11.9* 12.2  --    PLATELETS 10*3/mm3 190 207  --   --  239     Results from last 7 days   Lab Units 10/15/21  0636 10/14/21  0515 10/13/21  0107   SODIUM mmol/L 138 137 140   POTASSIUM mmol/L 4.7 4.3 4.3   CHLORIDE mmol/L 102 101 102   CO2 mmol/L 24.5 25.3 25.5   BUN mg/dL 17 24* 26*   CREATININE mg/dL 0.67 0.78 1.18*   GLUCOSE mg/dL 139* 132* 119*   Estimated Creatinine Clearance: 122.9 mL/min (by C-G formula based on SCr of 0.67 mg/dL).  Results from last 7 days   Lab Units 10/13/21  0107   ALBUMIN g/dL 4.70   BILIRUBIN mg/dL 0.2   ALK PHOS U/L 61   AST (SGOT) U/L 47*   ALT (SGPT) U/L 44*     Results from last 7 days   Lab Units 10/15/21  0636 10/14/21  0515 10/13/21  0107   CALCIUM mg/dL 8.5* 8.7 9.5   ALBUMIN g/dL  --   --  4.70       COVID19   Date Value Ref Range Status   10/13/2021 Not Detected Not Detected - Ref. Range Final     Glucose   Date/Time Value Ref Range Status   10/15/2021 1112 236 (H) 70 - 130 mg/dL Final     Comment:     Meter: [id number] : [id number] Maríamaria fernanda Quan NA   10/15/2021 0548 121 70 - 130 mg/dL Final     Comment:     Meter: [id number] : [id number] [facility] NA   10/14/2021 2021 130 70 - 130 mg/dL Final     Comment:     Meter: [id number] : [id number] Nima Astorga NA   10/14/2021 1609 206 (H) 70 - 130 mg/dL Final     Comment:     Meter: [id number] : [id number] Tea FAIR   10/14/2021 1106 133 (H) 70 - 130 mg/dL Final     Comment:     Meter: [id number] : [id number] Tea FAIR   10/14/2021 0643 144 (H) 70 - 130 mg/dL Final     Comment:     Meter: [id number] : [id number] Garry Johnson    10/13/2021 1951 112 70 - 130 mg/dL Final      Comment:     Meter: [id number] : [id number] Nima FAIR       Cardiac Catheterization/Vascular Study  1. Normal coronary arteries  2. Normal pulmonary pressures  3. Elevated left ventricular filling pressures    Scheduled Medications  cetirizine, 10 mg, Oral, Daily  clonazePAM, 1 mg, Oral, TID  famotidine, 20 mg, Oral, BID AC  fenofibrate, 145 mg, Oral, Daily  furosemide, 40 mg, Oral, Daily  gabapentin, 600 mg, Oral, Q8H  insulin lispro, 0-9 Units, Subcutaneous, TID AC  metoprolol tartrate, 25 mg, Oral, BID  potassium chloride, 10 mEq, Oral, Daily  senna-docusate sodium, 2 tablet, Oral, BID  sodium chloride, 10 mL, Intravenous, Q12H  temazepam, 30 mg, Oral, Nightly  vilazodone, 40 mg, Oral, Daily    Infusions  sodium chloride, 75 mL/hr, Last Rate: 75 mL/hr (10/15/21 1005)    Diet  Diet Regular; Consistent Carbohydrate       Assessment/Plan     Active Hospital Problems    Diagnosis  POA   • **Chest pain [R07.9]  Yes   • Precordial chest pain [R07.2]  Yes   • Bipolar disorder (HCC) [F31.9]  Yes   • Depression [F32.A]  Yes   • Fibromyalgia [M79.7]  Yes   • Esophageal reflux [K21.9]  Yes   • Hyperlipidemia [E78.5]  Yes   • Chronic low back pain [M54.50, G89.29]  Yes   • Anxiety [F41.9]  Yes   • Hypertension [I10]  Yes      Resolved Hospital Problems   No resolved problems to display.       55 y.o. female admitted with Chest pain.    Chest pain/SOA  -cardiac catheterization showed normal coronaries  -chest x-ray was negative  -she is on RA  Groin pain at cath site, right lower panus abdominal pain  -no tenderness, bruising, bruit at cath site  -mild tenderness without induration, bruising, mass under right panus  -hemoglobin is 9.8 down from 12 around the time of the catheterization  -discussed with radiology. On IVF for renal protection and checking a CT abdomen/pelvis with contrast, which is still pending  Probable BREE with obesity hypoventilation  -pt having difficulty obtaining outpatient sleep study  -she  feels like she is benefiting from CPAP  -pulmonology is consulted.  Fibromyalgia, chronic pain, arthritis pain, chronic  low back pain, cervical pain, left shoulder pain  -Discussed with JUNO Younger. Pt would like to reestablish with CAIT.  -will check left shoulder x-ray. Differ any other potential imaging to CAIT  -On Neurontin 3 times daily  Hypertension  -Stable, continue medical management  Hyperlipidemia-mostly hypertriglyceridemia  -pt is on fenofibrate micronized (LOFIBRA) 134 as an outpt  Depression/ Bipolar disorder/ADD/Anxiety  -Patient is on Neurontin, temazepam, trazodone, and Viibryd   -Holding the Adderall  Esophageal reflux  -pepcid  SCDs for DVT prophylaxis.  Full code.  Discussed with patient, nursing staff and consulting provider.  Anticipate discharge home in 1-2 days.       Rudy Wilson MD  [facility]  10/15/21  13:23 EDT    I wore protective equipment throughout this patient encounter including a face mask, gloves and protective eyewear.  Hand hygiene was performed before donning protective equipment and after removal when leaving the room.

## 2021-10-15 NOTE — DISCHARGE PLACEMENT REQUEST
"Travis Yancey (55 y.o. Female)             Date of Birth Social Security Number Address Home Phone MRN    1965  [address] [facility] [id number] [phone] [id number]    Jew Marital Status             None        Admission Date Admission Type Admitting Provider Attending Provider Department, Room/Bed    10/13/21 Emergency Brooke Espinoza MD Snyder, Perry, MD [address], [address]/1    Discharge Date Discharge Disposition Discharge Destination                         Attending Provider: Rudy Wilson MD    Allergies: No Known Allergies    Isolation: None   Infection: None   Code Status: CPR   Advance Care Planning Activity    Ht: 165.1 cm (65\")   Wt: 115 kg (253 lb 4.8 oz)    Admission Cmt: None   Principal Problem: Chest pain [R07.9]                 Active Insurance as of 10/13/2021     Primary Coverage     Payor Plan Insurance Group Employer/Plan Group    HUMANA MEDICARE REPLACEMENT HUMANA MEDICARE REPLACEMENT [id number]     Payor Plan Address Payor Plan Phone Number Payor Plan Fax Number Effective Dates    [address] [address] [phone]  3/1/2021 - None Entered    [facility] [phone]       Subscriber Name Subscriber Birth Date Member ID       TRAVIS YANCEY 1965 [id number]           Secondary Coverage     Payor Plan Insurance Group Employer/Plan Group    [facility] BY SALAZAR [facility] BY MARTIN [id number]     Payor Plan Address Payor Plan Phone Number Payor Plan Fax Number Effective Dates    [address]   1/1/2021 - None Entered    [facility] [id number]       Subscriber Name Subscriber Birth Date Member ID       TRAVIS AYNCEY 1965 [id number]                 Emergency Contacts      (Rel.) Home Phone Work Phone Mobile Phone    BRITNEY DUTTON (Daughter) [phone] -- [phone]    LISE RAHMAN (Father) [phone] -- [phone]    AKI LOMAX (DAUGHER IN LAW) (Relative) [phone] -- [phone]              "

## 2021-10-16 LAB
ANION GAP SERPL CALCULATED.3IONS-SCNC: 12.7 MMOL/L (ref 5–15)
BASOPHILS # BLD AUTO: 0.09 10*3/MM3 (ref 0–0.2)
BASOPHILS NFR BLD AUTO: 1.2 % (ref 0–1.5)
BUN SERPL-MCNC: 17 MG/DL (ref 6–20)
BUN/CREAT SERPL: 20.2 (ref 7–25)
CALCIUM SPEC-SCNC: 8.5 MG/DL (ref 8.6–10.5)
CHLORIDE SERPL-SCNC: 101 MMOL/L (ref 98–107)
CO2 SERPL-SCNC: 22.3 MMOL/L (ref 22–29)
CREAT SERPL-MCNC: 0.84 MG/DL (ref 0.57–1)
DEPRECATED RDW RBC AUTO: 43.8 FL (ref 37–54)
EOSINOPHIL # BLD AUTO: 0.22 10*3/MM3 (ref 0–0.4)
EOSINOPHIL NFR BLD AUTO: 2.9 % (ref 0.3–6.2)
ERYTHROCYTE [DISTWIDTH] IN BLOOD BY AUTOMATED COUNT: 14.7 % (ref 12.3–15.4)
GFR SERPL CREATININE-BSD FRML MDRD: 71 ML/MIN/1.73
GLUCOSE BLDC GLUCOMTR-MCNC: 157 MG/DL (ref 70–130)
GLUCOSE BLDC GLUCOMTR-MCNC: 158 MG/DL (ref 70–130)
GLUCOSE BLDC GLUCOMTR-MCNC: 185 MG/DL (ref 70–130)
GLUCOSE BLDC GLUCOMTR-MCNC: 195 MG/DL (ref 70–130)
GLUCOSE SERPL-MCNC: 211 MG/DL (ref 65–99)
HCT VFR BLD AUTO: 34.9 % (ref 34–46.6)
HGB BLD-MCNC: 11 G/DL (ref 12–15.9)
IMM GRANULOCYTES # BLD AUTO: 0.14 10*3/MM3 (ref 0–0.05)
IMM GRANULOCYTES NFR BLD AUTO: 1.9 % (ref 0–0.5)
LYMPHOCYTES # BLD AUTO: 2.3 10*3/MM3 (ref 0.7–3.1)
LYMPHOCYTES NFR BLD AUTO: 30.8 % (ref 19.6–45.3)
MCH RBC QN AUTO: 26.3 PG (ref 26.6–33)
MCHC RBC AUTO-ENTMCNC: 31.5 G/DL (ref 31.5–35.7)
MCV RBC AUTO: 83.5 FL (ref 79–97)
MONOCYTES # BLD AUTO: 0.41 10*3/MM3 (ref 0.1–0.9)
MONOCYTES NFR BLD AUTO: 5.5 % (ref 5–12)
NEUTROPHILS NFR BLD AUTO: 4.31 10*3/MM3 (ref 1.7–7)
NEUTROPHILS NFR BLD AUTO: 57.7 % (ref 42.7–76)
NRBC BLD AUTO-RTO: 0 /100 WBC (ref 0–0.2)
PLATELET # BLD AUTO: 228 10*3/MM3 (ref 140–450)
PMV BLD AUTO: 11.8 FL (ref 6–12)
POTASSIUM SERPL-SCNC: 4.7 MMOL/L (ref 3.5–5.2)
RBC # BLD AUTO: 4.18 10*6/MM3 (ref 3.77–5.28)
SODIUM SERPL-SCNC: 136 MMOL/L (ref 136–145)
WBC # BLD AUTO: 7.47 10*3/MM3 (ref 3.4–10.8)

## 2021-10-16 PROCEDURE — 99214 OFFICE O/P EST MOD 30 MIN: CPT | Performed by: NURSE PRACTITIONER

## 2021-10-16 PROCEDURE — 82962 GLUCOSE BLOOD TEST: CPT

## 2021-10-16 PROCEDURE — 80048 BASIC METABOLIC PNL TOTAL CA: CPT | Performed by: INTERNAL MEDICINE

## 2021-10-16 PROCEDURE — G0378 HOSPITAL OBSERVATION PER HR: HCPCS

## 2021-10-16 PROCEDURE — 25010000002 MORPHINE PER 10 MG: Performed by: INTERNAL MEDICINE

## 2021-10-16 PROCEDURE — 63710000001 INSULIN LISPRO (HUMAN) PER 5 UNITS: Performed by: INTERNAL MEDICINE

## 2021-10-16 PROCEDURE — 94799 UNLISTED PULMONARY SVC/PX: CPT

## 2021-10-16 PROCEDURE — 85025 COMPLETE CBC W/AUTO DIFF WBC: CPT | Performed by: INTERNAL MEDICINE

## 2021-10-16 RX ORDER — METHOCARBAMOL 750 MG/1
750 TABLET, FILM COATED ORAL 4 TIMES DAILY
Status: DISCONTINUED | OUTPATIENT
Start: 2021-10-16 | End: 2021-10-17 | Stop reason: HOSPADM

## 2021-10-16 RX ORDER — LIDOCAINE 50 MG/G
1 PATCH TOPICAL
Status: DISCONTINUED | OUTPATIENT
Start: 2021-10-16 | End: 2021-10-17 | Stop reason: HOSPADM

## 2021-10-16 RX ADMIN — DOCUSATE SODIUM 50MG AND SENNOSIDES 8.6MG 2 TABLET: 8.6; 5 TABLET, FILM COATED ORAL at 22:05

## 2021-10-16 RX ADMIN — INSULIN LISPRO 2 UNITS: 100 INJECTION, SOLUTION INTRAVENOUS; SUBCUTANEOUS at 12:04

## 2021-10-16 RX ADMIN — FUROSEMIDE 40 MG: 40 TABLET ORAL at 08:11

## 2021-10-16 RX ADMIN — TEMAZEPAM 30 MG: 15 CAPSULE ORAL at 22:07

## 2021-10-16 RX ADMIN — MORPHINE SULFATE 1 MG: 2 INJECTION, SOLUTION INTRAMUSCULAR; INTRAVENOUS at 01:49

## 2021-10-16 RX ADMIN — MORPHINE SULFATE 1 MG: 2 INJECTION, SOLUTION INTRAMUSCULAR; INTRAVENOUS at 23:54

## 2021-10-16 RX ADMIN — METOPROLOL TARTRATE 25 MG: 25 TABLET, FILM COATED ORAL at 22:06

## 2021-10-16 RX ADMIN — MORPHINE SULFATE 1 MG: 2 INJECTION, SOLUTION INTRAMUSCULAR; INTRAVENOUS at 18:48

## 2021-10-16 RX ADMIN — DICLOFENAC SODIUM 2 G: 10 GEL TOPICAL at 17:18

## 2021-10-16 RX ADMIN — SODIUM CHLORIDE, PRESERVATIVE FREE 10 ML: 5 INJECTION INTRAVENOUS at 08:13

## 2021-10-16 RX ADMIN — POTASSIUM CHLORIDE 10 MEQ: 750 TABLET, EXTENDED RELEASE ORAL at 08:11

## 2021-10-16 RX ADMIN — GABAPENTIN 600 MG: 300 CAPSULE ORAL at 22:07

## 2021-10-16 RX ADMIN — FAMOTIDINE 20 MG: 20 TABLET, FILM COATED ORAL at 17:17

## 2021-10-16 RX ADMIN — METHOCARBAMOL TABLETS 750 MG: 750 TABLET, COATED ORAL at 22:06

## 2021-10-16 RX ADMIN — CETIRIZINE HYDROCHLORIDE 10 MG: 10 TABLET ORAL at 08:11

## 2021-10-16 RX ADMIN — SODIUM CHLORIDE, PRESERVATIVE FREE 10 ML: 5 INJECTION INTRAVENOUS at 22:07

## 2021-10-16 RX ADMIN — HYDROCODONE BITARTRATE AND ACETAMINOPHEN 1 TABLET: 5; 325 TABLET ORAL at 08:12

## 2021-10-16 RX ADMIN — INSULIN LISPRO 2 UNITS: 100 INJECTION, SOLUTION INTRAVENOUS; SUBCUTANEOUS at 17:23

## 2021-10-16 RX ADMIN — METHOCARBAMOL TABLETS 750 MG: 750 TABLET, COATED ORAL at 17:17

## 2021-10-16 RX ADMIN — MORPHINE SULFATE 1 MG: 2 INJECTION, SOLUTION INTRAMUSCULAR; INTRAVENOUS at 06:04

## 2021-10-16 RX ADMIN — GABAPENTIN 600 MG: 300 CAPSULE ORAL at 06:05

## 2021-10-16 RX ADMIN — METOPROLOL TARTRATE 25 MG: 25 TABLET, FILM COATED ORAL at 08:12

## 2021-10-16 RX ADMIN — HYDROCODONE BITARTRATE AND ACETAMINOPHEN 1 TABLET: 5; 325 TABLET ORAL at 14:25

## 2021-10-16 RX ADMIN — VILAZODONE HYDROCHLORIDE 40 MG: 40 TABLET ORAL at 08:11

## 2021-10-16 RX ADMIN — SODIUM CHLORIDE 75 ML/HR: 9 INJECTION, SOLUTION INTRAVENOUS at 17:26

## 2021-10-16 RX ADMIN — HYDROCODONE BITARTRATE AND ACETAMINOPHEN 1 TABLET: 5; 325 TABLET ORAL at 22:05

## 2021-10-16 RX ADMIN — CLONAZEPAM 1 MG: 1 TABLET ORAL at 08:12

## 2021-10-16 RX ADMIN — GABAPENTIN 600 MG: 300 CAPSULE ORAL at 17:18

## 2021-10-16 RX ADMIN — FENOFIBRATE 145 MG: 145 TABLET ORAL at 08:12

## 2021-10-16 RX ADMIN — CLONAZEPAM 1 MG: 1 TABLET ORAL at 22:06

## 2021-10-16 RX ADMIN — INSULIN LISPRO 2 UNITS: 100 INJECTION, SOLUTION INTRAVENOUS; SUBCUTANEOUS at 08:12

## 2021-10-16 RX ADMIN — FAMOTIDINE 20 MG: 20 TABLET, FILM COATED ORAL at 08:11

## 2021-10-16 RX ADMIN — CLONAZEPAM 1 MG: 1 TABLET ORAL at 17:18

## 2021-10-16 RX ADMIN — MORPHINE SULFATE 1 MG: 2 INJECTION, SOLUTION INTRAMUSCULAR; INTRAVENOUS at 12:08

## 2021-10-16 RX ADMIN — DICLOFENAC SODIUM 2 G: 10 GEL TOPICAL at 22:08

## 2021-10-16 NOTE — PROGRESS NOTES
Name: Travis Yancey ADMIT: 10/13/2021   : 1965  PCP: Jeferson Harmon MD    MRN: [id number] LOS: 1 days   AGE/SEX: 55 y.o. female  ROOM: [facility]     Subjective   Subjective     She's doing okay today, but still has a stiff neck and left shoulder pain.        Objective   Objective   Vital Signs  Temp:  [97.8 °F (36.6 °C)-98.7 °F (37.1 °C)] 97.8 °F (36.6 °C)  Heart Rate:  [] 87  Resp:  [16-18] 18  BP: (108-134)/(66-78) 115/66  SpO2:  [96 %-98 %] 97 %  on  Flow (L/min):  [2] 2;   Device (Oxygen Therapy): room air  Body mass index is 42.92 kg/m².  Physical Exam  Constitutional:       General: She is not in acute distress.     Appearance: She is obese.   Cardiovascular:      Rate and Rhythm: Normal rate and regular rhythm.      Heart sounds: Normal heart sounds.   Pulmonary:      Effort: Pulmonary effort is normal.      Breath sounds: Normal breath sounds.   Abdominal:      General: Bowel sounds are normal. There is no distension.      Palpations: Abdomen is soft. There is no mass.      Tenderness: There is no abdominal tenderness. There is no guarding or rebound.   Musculoskeletal:         General: No tenderness.      Right lower leg: No edema.      Left lower leg: No edema.   Neurological:      Mental Status: She is alert.   Psychiatric:         Mood and Affect: Mood normal.         Behavior: Behavior normal.         Results Review     I reviewed the patient's new clinical results.  Results from last 7 days   Lab Units 10/16/21  0908 10/15/21  0636 10/14/21  0515 10/13/21  1604 10/13/21  1539 10/13/21  0107   WBC 10*3/mm3 7.47 5.95 8.00  --   --  9.44   HEMOGLOBIN g/dL 11.0* 9.8* 10.4*  --   --  11.9*   HEMOGLOBIN, POC g/dL  --   --   --  11.9*   < >  --    PLATELETS 10*3/mm3 228 190 207  --   --  239    < > = values in this interval not displayed.     Results from last 7 days   Lab Units 10/16/21  0908 10/15/21  0636 10/14/21  0515 10/13/21  0107   SODIUM mmol/L 136 138 137 140   POTASSIUM  mmol/L 4.7 4.7 4.3 4.3   CHLORIDE mmol/L 101 102 101 102   CO2 mmol/L 22.3 24.5 25.3 25.5   BUN mg/dL 17 17 24* 26*   CREATININE mg/dL 0.84 0.67 0.78 1.18*   GLUCOSE mg/dL 211* 139* 132* 119*   Estimated Creatinine Clearance: 99 mL/min (by C-G formula based on SCr of 0.84 mg/dL).  Results from last 7 days   Lab Units 10/13/21  0107   ALBUMIN g/dL 4.70   BILIRUBIN mg/dL 0.2   ALK PHOS U/L 61   AST (SGOT) U/L 47*   ALT (SGPT) U/L 44*     Results from last 7 days   Lab Units 10/16/21  0908 10/15/21  0636 10/14/21  0515 10/13/21  0107   CALCIUM mg/dL 8.5* 8.5* 8.7 9.5   ALBUMIN g/dL  --   --   --  4.70       COVID19   Date Value Ref Range Status   10/13/2021 Not Detected Not Detected - Ref. Range Final     Glucose   Date/Time Value Ref Range Status   10/16/2021 1122 195 (H) 70 - 130 mg/dL Final     Comment:     Meter: [id number] : [id number] María FAIR   10/16/2021 0629 157 (H) 70 - 130 mg/dL Final     Comment:     Meter: [id number] : [id number] Mita Lion NA   10/15/2021 2059 129 70 - 130 mg/dL Final     Comment:     Meter: [id number] : [id number] Mita Lion NA   10/15/2021 1633 133 (H) 70 - 130 mg/dL Final     Comment:     Meter: [id number] : [id number] María Quan NA   10/15/2021 1112 236 (H) 70 - 130 mg/dL Final     Comment:     Meter: [id number] : [id number] María Quan NA   10/15/2021 0548 121 70 - 130 mg/dL Final     Comment:     Meter: [id number] : [id number] [facility] NA   10/14/2021 2021 130 70 - 130 mg/dL Final     Comment:     Meter: [id number] : [id number] Nima Astorga NA       XR Spine Cervical 2 or 3 View, XR Spine Thoracic 1 View  Narrative: CERVICAL SPINE AND THORACIC SPINE X-RAYS     HISTORY: Neck and back pain.     TECHNIQUE: Two images of the cervical spine and three images of the  thoracic spine are provided.     FINDINGS: There appears to be solid ankylosis across the C5-6 disc  space. There is advanced degenerative disc change at C3-4, C4-5, and  C6-7.  Vertebral height and alignment are normal. The AP view shows  multilevel facet arthropathy.     Thoracic spine images show clips from previous cholecystectomy. There is  degenerative disc change throughout the mid to lower thoracic spine.  Vertebral height and alignment are normal. There is no widening of the  paravertebral stripes.     Impression: Degenerative change in the cervical and the thoracic spine  as described. No subluxation, compression deformity, or x-ray evidence  of tumor is present.     This report was finalized on 10/15/2021 8:38 PM by Dr. Jaxon Mijares M.D.     CT Abdomen Pelvis With Contrast  Narrative: CT ABDOMEN PELVIS W CONTRAST-     CLINICAL HISTORY: Abdomen pain and right groin pain following cardiac  catheterization     TECHNIQUE: Spiral CT images were acquired through the abdomen and pelvis  with IV contrast and were reconstructed in 3 mm thick slices.     Radiation dose reduction techniques were utilized, including automated  exposure control and exposure modulation based on body size.     COMPARISON: None     FINDINGS: There is diffuse slightly diminished attenuation throughout  the liver consistent with mild hepatic steatosis. No focal hepatic  lesions are identified. The gallbladder is absent. There is no bile duct  dilatation. The spleen and pancreas and kidneys and adrenal glands  appear within normal limits. The stomach and small and large bowel are  unremarkable. There is no bowel distention. No abnormal masses or fluid  collections are identified in the abdomen or pelvis. There is minimal  injection of the subcutaneous fat in the right inguinal region  consistent with typical changes following angiography. There is no  evidence of a significant hematoma within the right inguinal region or  in the retroperitoneum. Artifact from a right hip arthroplasty is noted.     Impression: Mild hepatic steatosis. Status post cholecystectomy. Typical  post angiography changes in the right  inguinal region as described.  Otherwise unremarkable CT scan of the abdomen and pelvis.     This report was finalized on 10/15/2021 4:37 PM by Dr. Sundar Amaya M.D.     XR Shoulder 2+ View Left  TWO-VIEW LEFT SHOULDER     HISTORY: Shoulder pain. No recent trauma.     FINDINGS:  There is slight degenerative change at the AC joint and there  also appears to be slight impingement of the superior margin of the  humeral head with the inferior margin of the acromion.     This report was finalized on 10/15/2021 3:49 PM by Dr. Kavon Villegas M.D.     Cardiac Catheterization/Vascular Study  1. Normal coronary arteries  2. Normal pulmonary pressures  3. Elevated left ventricular filling pressures    Scheduled Medications  cetirizine, 10 mg, Oral, Daily  clonazePAM, 1 mg, Oral, TID  famotidine, 20 mg, Oral, BID AC  fenofibrate, 145 mg, Oral, Daily  furosemide, 40 mg, Oral, Daily  gabapentin, 600 mg, Oral, Q8H  insulin lispro, 0-9 Units, Subcutaneous, TID AC  metoprolol tartrate, 25 mg, Oral, BID  potassium chloride, 10 mEq, Oral, Daily  senna-docusate sodium, 2 tablet, Oral, BID  sodium chloride, 10 mL, Intravenous, Q12H  temazepam, 30 mg, Oral, Nightly  vilazodone, 40 mg, Oral, Daily    Infusions  sodium chloride, 75 mL/hr, Last Rate: 75 mL/hr (10/16/21 1025)    Diet  Diet Regular; Consistent Carbohydrate       Assessment/Plan     Active Hospital Problems    Diagnosis  POA   • **Chest pain [R07.9]  Yes   • Precordial chest pain [R07.2]  Yes   • Bipolar disorder (HCC) [F31.9]  Yes   • Depression [F32.A]  Yes   • Fibromyalgia [M79.7]  Yes   • Esophageal reflux [K21.9]  Yes   • Hyperlipidemia [E78.5]  Yes   • Chronic low back pain [M54.50, G89.29]  Yes   • Anxiety [F41.9]  Yes   • Hypertension [I10]  Yes      Resolved Hospital Problems   No resolved problems to display.       55 y.o. female admitted with Chest pain.    Chest pain/SOA  -cardiac catheterization showed normal coronaries  -chest x-ray was negative  -she is on  RA  Groin pain at cath site, right lower panus abdominal pain  -resolved  -ct abdomen/pelvis was negative  Probable BREE with obesity hypoventilation  -pt having difficulty obtaining outpatient sleep study  -pulmonology evaluated  -she will need outpatient follow up with them  Fibromyalgia, chronic pain, arthritis pain, chronic  low back pain, cervical pain, left shoulder pain  -left shoulder x-ray with degenerative changes and slight impingement of the superior margin of the humeral head with the inferior margin of the acromion. Will add ice packs, Voltaren cream, and ask PT to see  -cervical and thoracic x-rays with degenerative changes  -MRI cervical spine pending  -appreciate CAIT assistance  -On Neurontin 3 times daily  Hypertension  -well controlled  Hyperlipidemia-mostly hypertriglyceridemia  -pt is on fenofibrate micronized (LOFIBRA) 134 as an outpt  Depression/ Bipolar disorder/ADD/Anxiety  -Patient is on Neurontin, temazepam, trazodone, and Viibryd   -Holding the Adderall  Esophageal reflux  -pepcid  SCDs for DVT prophylaxis.  Full code.  Discussed with patient.  Anticipate discharge home in 1-2 days.       Rudy Wilson MD  [facility]  10/16/21  11:40 EDT    I wore protective equipment throughout this patient encounter including a face mask, gloves and protective eyewear.  Hand hygiene was performed before donning protective equipment and after removal when leaving the room.

## 2021-10-16 NOTE — PROGRESS NOTES
[facility] PROGRESS NOTE      CC: Neck and left arm pain      Subjective     Interval History: c/o stiff neck and left shoulder pain. No acute events reported overnight.     ROS:  Constitutional: No fever, chills  GI: No nausea, vomiting  MS: + neck pain  Neuro: No numbness, tingling, or weakness,  no balance difficulties  : No difficulty voiding, no incontinence    Objective     Vital signs in last 24 hours:  Temp:  [97.8 °F (36.6 °C)-98.7 °F (37.1 °C)] 97.9 °F (36.6 °C)  Heart Rate:  [81-92] 92  Resp:  [16-18] 18  BP: (108-133)/(66-78) 133/74    Intake/Output this shift:  I/O this shift:  In: 480 [P.O.:480]  Out: -     LABS:  Results from last 7 days   Lab Units 10/16/21  0908 10/15/21  0636 10/14/21  0515   WBC 10*3/mm3 7.47 5.95 8.00   HEMOGLOBIN g/dL 11.0* 9.8* 10.4*   HEMATOCRIT % 34.9 30.2* 32.5*   PLATELETS 10*3/mm3 228 190 207     Results from last 7 days   Lab Units 10/16/21  0908 10/15/21  0636 10/14/21  0515 10/13/21  0107 10/13/21  0107   SODIUM mmol/L 136 138 137   < > 140   POTASSIUM mmol/L 4.7 4.7 4.3   < > 4.3   CHLORIDE mmol/L 101 102 101   < > 102   CO2 mmol/L 22.3 24.5 25.3   < > 25.5   BUN mg/dL 17 17 24*   < > 26*   CREATININE mg/dL 0.84 0.67 0.78   < > 1.18*   CALCIUM mg/dL 8.5* 8.5* 8.7   < > 9.5   BILIRUBIN mg/dL  --   --   --   --  0.2   ALK PHOS U/L  --   --   --   --  61   ALT (SGPT) U/L  --   --   --   --  44*   AST (SGOT) U/L  --   --   --   --  47*   GLUCOSE mg/dL 211* 139* 132*   < > 119*    < > = values in this interval not displayed.         IMAGING STUDIES:  CERVICAL SPINE XR 10/15-  There appears to be solid ankylosis across the C5-6 disc space. There is advanced degenerative disc change at C3-4, C4-5, and C6-7. Vertebral height and alignment are normal. The AP view shows multilevel facet arthropathy.    THORACIC SPINE XR 10/15-  There is degenerative disc change throughout the mid to lower thoracic spine.  Vertebral height and alignment are normal. There is no  widening of the paravertebral stripes.    CERVICAL SPINE MRI- PENDING    I personally viewed and interpreted the patient's clinical data and spine imaging and discussed these results with Dr. Carbajal.    Meds reviewed/changed: Yes      Physical Exam:    General:   Awake, alert, oriented x3. Speech clear with no aphasia.   Back:    Cervical spine pain with palpation     Motor: Normal muscle strength, bulk and tone in upper and lower extremities.  Strong and equal bilaterally.  Sensation: Normal to light touch  Station and Gait:             RN reports ambulating ad gina  Extremities:   Wearing SCDs      Assessment/Plan     ASSESSMENT:      Chest pain    Precordial chest pain    Anxiety    Chronic low back pain    Bipolar disorder (HCC)    Depression    Esophageal reflux    Fibromyalgia    Hyperlipidemia    Hypertension    Ms. Yancey is a 56 y/o patient with a long standing history of neck and back pain that date back to 2002. She has had multiple spine surgeries with Dr. Rodriguez as well as facet and lumbar injections. She initially presented to here with chest pain but has had a negative cardiac work-up.     Cervical and thoracic spine XR are negative for any subluxation or compression deformity. There is advanced degenerative disc change at C3-4, C4-5, and C6-7, however vertebral height and alignment appear to be normal. There is also degenerative disc change throughout the mid to lower thoracic spine, however vertebral height and alignment appear to be normal here as well. Cervical spine MRI is pending. If the patient continues to complain of pain, recommend OP pain management through her PCP to help assist with this. We will add transdermal  lidocaine patches Q12H and 750 mg Robaxin Q6H. She requested to begin seeing us on an OP basis as she thinks she has some lumbar spinal stenosis. Advised her that she can call our office to set up a new patient appointment.     PLAN:   We will await cervical spine MRI- if it is  "stable we are ok with her being discharged once attending feels it's appropriate  Transdermal lidocaine patch Q12H to either posterior neck or left shoulder based on patient complain.  Robaxin 750 mg PO Q6H.              I discussed the patient's findings and my recommendations with patient, nursing staff and Dr. Carbajal       LOS: 1 day       Eufemia Good, APRN  10/16/2021  15:43 EDT    \"Dictated utilizing Dragon dictation\".    "

## 2021-10-16 NOTE — PLAN OF CARE
Problem: Adult Inpatient Plan of Care  Goal: Plan of Care Review  Outcome: Ongoing, Progressing  Flowsheets (Taken 10/16/2021 3559)  Plan of Care Reviewed With: patient  Outcome Summary: VSS. Pt complains of neck and back pain-chronic pain. PRN Norco and Morphine given SEE MAR. Pt ambulating multiple times in the hallway and in room without any issues or complaints. MRI screening completed but due to high volume patients with STAT orders MRI will be completed today. Pt did have XRAY completed. Neurosurgery following. Cath sites to right wrist and groin clean dry intact without redness pain or swelling. Will continue to monitor.   Goal Outcome Evaluation:  Plan of Care Reviewed With: patient           Outcome Summary: VSS. Pt complains of neck and back pain-chronic pain. PRN Norco and Morphine given SEE MAR. Pt ambulating multiple times in the hallway and in room without any issues or complaints. MRI screening completed but due to high volume patients with STAT orders MRI will be completed today. Pt did have XRAY completed. Neurosurgery following. Cath sites to right wrist and groin clean dry intact without redness pain or swelling. Will continue to monitor.

## 2021-10-17 ENCOUNTER — READMISSION MANAGEMENT (OUTPATIENT)
Dept: CALL CENTER | Facility: HOSPITAL | Age: 56
End: 2021-10-17

## 2021-10-17 ENCOUNTER — APPOINTMENT (OUTPATIENT)
Dept: MRI IMAGING | Facility: HOSPITAL | Age: 56
End: 2021-10-17

## 2021-10-17 VITALS
DIASTOLIC BLOOD PRESSURE: 63 MMHG | HEIGHT: 65 IN | WEIGHT: 260.5 LBS | SYSTOLIC BLOOD PRESSURE: 117 MMHG | BODY MASS INDEX: 43.4 KG/M2 | RESPIRATION RATE: 18 BRPM | OXYGEN SATURATION: 95 % | TEMPERATURE: 98.9 F | HEART RATE: 84 BPM

## 2021-10-17 PROBLEM — R07.2 PRECORDIAL CHEST PAIN: Status: RESOLVED | Noted: 2021-10-13 | Resolved: 2021-10-17

## 2021-10-17 PROBLEM — R07.9 CHEST PAIN: Status: RESOLVED | Noted: 2021-10-13 | Resolved: 2021-10-17

## 2021-10-17 LAB
GLUCOSE BLDC GLUCOMTR-MCNC: 138 MG/DL (ref 70–130)
GLUCOSE BLDC GLUCOMTR-MCNC: 151 MG/DL (ref 70–130)
GLUCOSE BLDC GLUCOMTR-MCNC: 177 MG/DL (ref 70–130)

## 2021-10-17 PROCEDURE — G0378 HOSPITAL OBSERVATION PER HR: HCPCS

## 2021-10-17 PROCEDURE — 25010000002 MORPHINE PER 10 MG: Performed by: INTERNAL MEDICINE

## 2021-10-17 PROCEDURE — 72156 MRI NECK SPINE W/O & W/DYE: CPT

## 2021-10-17 PROCEDURE — 82962 GLUCOSE BLOOD TEST: CPT

## 2021-10-17 PROCEDURE — A9577 INJ MULTIHANCE: HCPCS | Performed by: STUDENT IN AN ORGANIZED HEALTH CARE EDUCATION/TRAINING PROGRAM

## 2021-10-17 PROCEDURE — 63710000001 INSULIN LISPRO (HUMAN) PER 5 UNITS: Performed by: INTERNAL MEDICINE

## 2021-10-17 PROCEDURE — 99213 OFFICE O/P EST LOW 20 MIN: CPT | Performed by: NURSE PRACTITIONER

## 2021-10-17 PROCEDURE — 25010000002 ONDANSETRON PER 1 MG: Performed by: INTERNAL MEDICINE

## 2021-10-17 PROCEDURE — 0 GADOBENATE DIMEGLUMINE 529 MG/ML SOLUTION: Performed by: STUDENT IN AN ORGANIZED HEALTH CARE EDUCATION/TRAINING PROGRAM

## 2021-10-17 RX ORDER — FLUTICASONE PROPIONATE 50 MCG
2 SPRAY, SUSPENSION (ML) NASAL DAILY
Status: DISCONTINUED | OUTPATIENT
Start: 2021-10-17 | End: 2021-10-17 | Stop reason: HOSPADM

## 2021-10-17 RX ORDER — PANTOPRAZOLE SODIUM 40 MG/1
40 TABLET, DELAYED RELEASE ORAL DAILY
Status: DISCONTINUED | OUTPATIENT
Start: 2021-10-17 | End: 2021-10-17 | Stop reason: HOSPADM

## 2021-10-17 RX ORDER — METHOCARBAMOL 750 MG/1
750 TABLET, FILM COATED ORAL 4 TIMES DAILY
Qty: 56 TABLET | Refills: 0 | Status: SHIPPED | OUTPATIENT
Start: 2021-10-17 | End: 2021-10-17

## 2021-10-17 RX ORDER — LIDOCAINE 50 MG/G
1 PATCH TOPICAL
Qty: 30 PATCH | Refills: 0 | Status: SHIPPED | OUTPATIENT
Start: 2021-10-18 | End: 2021-10-17

## 2021-10-17 RX ORDER — METHOCARBAMOL 750 MG/1
750 TABLET, FILM COATED ORAL 4 TIMES DAILY
Qty: 56 TABLET | Refills: 0 | Status: SHIPPED | OUTPATIENT
Start: 2021-10-17 | End: 2021-10-31

## 2021-10-17 RX ORDER — LIDOCAINE 50 MG/G
1 PATCH TOPICAL
Qty: 30 PATCH | Refills: 0 | Status: SHIPPED | OUTPATIENT
Start: 2021-10-18 | End: 2021-11-12 | Stop reason: SDUPTHER

## 2021-10-17 RX ADMIN — SODIUM CHLORIDE 75 ML/HR: 9 INJECTION, SOLUTION INTRAVENOUS at 06:53

## 2021-10-17 RX ADMIN — DICLOFENAC SODIUM 2 G: 10 GEL TOPICAL at 09:45

## 2021-10-17 RX ADMIN — MORPHINE SULFATE 4 MG: 2 INJECTION, SOLUTION INTRAMUSCULAR; INTRAVENOUS at 18:13

## 2021-10-17 RX ADMIN — METHOCARBAMOL TABLETS 750 MG: 750 TABLET, COATED ORAL at 12:04

## 2021-10-17 RX ADMIN — LIDOCAINE 1 PATCH: 50 PATCH TOPICAL at 09:42

## 2021-10-17 RX ADMIN — GADOBENATE DIMEGLUMINE 20 ML: 529 INJECTION, SOLUTION INTRAVENOUS at 13:58

## 2021-10-17 RX ADMIN — CLONAZEPAM 1 MG: 1 TABLET ORAL at 16:27

## 2021-10-17 RX ADMIN — POTASSIUM CHLORIDE 10 MEQ: 750 TABLET, EXTENDED RELEASE ORAL at 09:44

## 2021-10-17 RX ADMIN — MORPHINE SULFATE 1 MG: 2 INJECTION, SOLUTION INTRAMUSCULAR; INTRAVENOUS at 09:03

## 2021-10-17 RX ADMIN — FUROSEMIDE 40 MG: 40 TABLET ORAL at 09:43

## 2021-10-17 RX ADMIN — HYDROCODONE BITARTRATE AND ACETAMINOPHEN 1 TABLET: 5; 325 TABLET ORAL at 16:27

## 2021-10-17 RX ADMIN — HYDROCODONE BITARTRATE AND ACETAMINOPHEN 1 TABLET: 5; 325 TABLET ORAL at 11:13

## 2021-10-17 RX ADMIN — INSULIN LISPRO 2 UNITS: 100 INJECTION, SOLUTION INTRAVENOUS; SUBCUTANEOUS at 12:04

## 2021-10-17 RX ADMIN — FENOFIBRATE 145 MG: 145 TABLET ORAL at 09:43

## 2021-10-17 RX ADMIN — METOPROLOL TARTRATE 25 MG: 25 TABLET, FILM COATED ORAL at 09:44

## 2021-10-17 RX ADMIN — HYDROCODONE BITARTRATE AND ACETAMINOPHEN 1 TABLET: 5; 325 TABLET ORAL at 06:53

## 2021-10-17 RX ADMIN — MORPHINE SULFATE 4 MG: 2 INJECTION, SOLUTION INTRAMUSCULAR; INTRAVENOUS at 12:55

## 2021-10-17 RX ADMIN — GABAPENTIN 600 MG: 300 CAPSULE ORAL at 14:30

## 2021-10-17 RX ADMIN — CETIRIZINE HYDROCHLORIDE 10 MG: 10 TABLET ORAL at 09:45

## 2021-10-17 RX ADMIN — CLONAZEPAM 1 MG: 1 TABLET ORAL at 09:42

## 2021-10-17 RX ADMIN — DICLOFENAC SODIUM 2 G: 10 GEL TOPICAL at 16:27

## 2021-10-17 RX ADMIN — ONDANSETRON 4 MG: 2 INJECTION INTRAMUSCULAR; INTRAVENOUS at 17:34

## 2021-10-17 RX ADMIN — MORPHINE SULFATE 1 MG: 2 INJECTION, SOLUTION INTRAMUSCULAR; INTRAVENOUS at 04:01

## 2021-10-17 RX ADMIN — METHOCARBAMOL TABLETS 750 MG: 750 TABLET, COATED ORAL at 08:01

## 2021-10-17 RX ADMIN — PANTOPRAZOLE SODIUM 40 MG: 40 TABLET, DELAYED RELEASE ORAL at 08:43

## 2021-10-17 RX ADMIN — VILAZODONE HYDROCHLORIDE 40 MG: 40 TABLET ORAL at 09:43

## 2021-10-17 RX ADMIN — GABAPENTIN 600 MG: 300 CAPSULE ORAL at 06:53

## 2021-10-17 NOTE — PROGRESS NOTES
Name: Travis Yancey ADMIT: 10/13/2021   : 1965  PCP: Jeferson Harmon MD    MRN: [id number] LOS: 1 days   AGE/SEX: 55 y.o. female  ROOM: [facility]     Subjective   Subjective     Says that lidocaine patches are helping the most with her left shoulder pain and the muscle relaxant is helping with back and neck pain. MRI c spine is still pending.       Objective   Objective   Vital Signs  Temp:  [97.7 °F (36.5 °C)-98.9 °F (37.2 °C)] 98.9 °F (37.2 °C)  Heart Rate:  [84-95] 84  Resp:  [18] 18  BP: (117-141)/(63-95) 117/63  SpO2:  [95 %-97 %] 95 %  on  Flow (L/min):  [2] 2;   Device (Oxygen Therapy): nasal cannula  Body mass index is 43.35 kg/m².  Physical Exam  Constitutional:       General: She is not in acute distress.     Appearance: She is obese.   Cardiovascular:      Rate and Rhythm: Normal rate and regular rhythm.      Heart sounds: Normal heart sounds.   Pulmonary:      Effort: Pulmonary effort is normal.      Breath sounds: Normal breath sounds.   Abdominal:      General: Bowel sounds are normal. There is no distension.      Palpations: Abdomen is soft. There is no mass.      Tenderness: There is no abdominal tenderness. There is no guarding or rebound.   Musculoskeletal:         General: No tenderness.      Right lower leg: No edema.      Left lower leg: No edema.   Neurological:      Mental Status: She is alert.   Psychiatric:         Mood and Affect: Mood normal.         Behavior: Behavior normal.         Results Review     I reviewed the patient's new clinical results.  Results from last 7 days   Lab Units 10/16/21  0908 10/15/21  0636 10/14/21  0515 10/13/21  1604 10/13/21  1539 10/13/21  0107   WBC 10*3/mm3 7.47 5.95 8.00  --   --  9.44   HEMOGLOBIN g/dL 11.0* 9.8* 10.4*  --   --  11.9*   HEMOGLOBIN, POC g/dL  --   --   --  11.9*   < >  --    PLATELETS 10*3/mm3 228 190 207  --   --  239    < > = values in this interval not displayed.     Results from last 7 days   Lab Units  10/16/21  0908 10/15/21  0636 10/14/21  0515 10/13/21  0107   SODIUM mmol/L 136 138 137 140   POTASSIUM mmol/L 4.7 4.7 4.3 4.3   CHLORIDE mmol/L 101 102 101 102   CO2 mmol/L 22.3 24.5 25.3 25.5   BUN mg/dL 17 17 24* 26*   CREATININE mg/dL 0.84 0.67 0.78 1.18*   GLUCOSE mg/dL 211* 139* 132* 119*   Estimated Creatinine Clearance: 99.5 mL/min (by C-G formula based on SCr of 0.84 mg/dL).  Results from last 7 days   Lab Units 10/13/21  0107   ALBUMIN g/dL 4.70   BILIRUBIN mg/dL 0.2   ALK PHOS U/L 61   AST (SGOT) U/L 47*   ALT (SGPT) U/L 44*     Results from last 7 days   Lab Units 10/16/21  0908 10/15/21  0636 10/14/21  0515 10/13/21  0107   CALCIUM mg/dL 8.5* 8.5* 8.7 9.5   ALBUMIN g/dL  --   --   --  4.70       COVID19   Date Value Ref Range Status   10/13/2021 Not Detected Not Detected - Ref. Range Final     Glucose   Date/Time Value Ref Range Status   10/17/2021 1126 177 (H) 70 - 130 mg/dL Final     Comment:     Meter: [id number] : [id number] Jeremie Redd MICHAELLEA   10/17/2021 0635 151 (H) 70 - 130 mg/dL Final     Comment:     Meter: [id number] : [id number] Cruz Nazia NA   10/16/2021 2029 185 (H) 70 - 130 mg/dL Final     Comment:     Meter: [id number] : [id number] Cruz Nazia NA   10/16/2021 1639 158 (H) 70 - 130 mg/dL Final     Comment:     Meter: [id number] : [id number] María Quan NA   10/16/2021 1122 195 (H) 70 - 130 mg/dL Final     Comment:     Meter: [id number] : [id number] María Quan NA   10/16/2021 0629 157 (H) 70 - 130 mg/dL Final     Comment:     Meter: [id number] : [id number] Mita FAIR   10/15/2021 2059 129 70 - 130 mg/dL Final     Comment:     Meter: [id number] : [id number] Mita FAIR       XR Spine Cervical 2 or 3 View, XR Spine Thoracic 1 View  Narrative: CERVICAL SPINE AND THORACIC SPINE X-RAYS     HISTORY: Neck and back pain.     TECHNIQUE: Two images of the cervical spine and three images of the  thoracic spine are provided.     FINDINGS: There appears to be  solid ankylosis across the C5-6 disc  space. There is advanced degenerative disc change at C3-4, C4-5, and  C6-7. Vertebral height and alignment are normal. The AP view shows  multilevel facet arthropathy.     Thoracic spine images show clips from previous cholecystectomy. There is  degenerative disc change throughout the mid to lower thoracic spine.  Vertebral height and alignment are normal. There is no widening of the  paravertebral stripes.     Impression: Degenerative change in the cervical and the thoracic spine  as described. No subluxation, compression deformity, or x-ray evidence  of tumor is present.     This report was finalized on 10/15/2021 8:38 PM by Dr. Jaxon Mijares M.D.     CT Abdomen Pelvis With Contrast  Narrative: CT ABDOMEN PELVIS W CONTRAST-     CLINICAL HISTORY: Abdomen pain and right groin pain following cardiac  catheterization     TECHNIQUE: Spiral CT images were acquired through the abdomen and pelvis  with IV contrast and were reconstructed in 3 mm thick slices.     Radiation dose reduction techniques were utilized, including automated  exposure control and exposure modulation based on body size.     COMPARISON: None     FINDINGS: There is diffuse slightly diminished attenuation throughout  the liver consistent with mild hepatic steatosis. No focal hepatic  lesions are identified. The gallbladder is absent. There is no bile duct  dilatation. The spleen and pancreas and kidneys and adrenal glands  appear within normal limits. The stomach and small and large bowel are  unremarkable. There is no bowel distention. No abnormal masses or fluid  collections are identified in the abdomen or pelvis. There is minimal  injection of the subcutaneous fat in the right inguinal region  consistent with typical changes following angiography. There is no  evidence of a significant hematoma within the right inguinal region or  in the retroperitoneum. Artifact from a right hip arthroplasty is noted.      Impression: Mild hepatic steatosis. Status post cholecystectomy. Typical  post angiography changes in the right inguinal region as described.  Otherwise unremarkable CT scan of the abdomen and pelvis.     This report was finalized on 10/15/2021 4:37 PM by Dr. Sundar Amaya M.D.     XR Shoulder 2+ View Left  TWO-VIEW LEFT SHOULDER     HISTORY: Shoulder pain. No recent trauma.     FINDINGS:  There is slight degenerative change at the AC joint and there  also appears to be slight impingement of the superior margin of the  humeral head with the inferior margin of the acromion.     This report was finalized on 10/15/2021 3:49 PM by Dr. Kavon Villegas M.D.     Cardiac Catheterization/Vascular Study  1. Normal coronary arteries  2. Normal pulmonary pressures  3. Elevated left ventricular filling pressures    Scheduled Medications  cetirizine, 10 mg, Oral, Daily  clonazePAM, 1 mg, Oral, TID  Diclofenac Sodium, 2 g, Topical, TID  fenofibrate, 145 mg, Oral, Daily  fluticasone, 2 spray, Nasal, Daily  furosemide, 40 mg, Oral, Daily  gabapentin, 600 mg, Oral, Q8H  insulin lispro, 0-9 Units, Subcutaneous, TID AC  lidocaine, 1 patch, Transdermal, Q24H  methocarbamol, 750 mg, Oral, 4x Daily  metoprolol tartrate, 25 mg, Oral, BID  pantoprazole, 40 mg, Oral, Daily  potassium chloride, 10 mEq, Oral, Daily  senna-docusate sodium, 2 tablet, Oral, BID  sodium chloride, 10 mL, Intravenous, Q12H  temazepam, 30 mg, Oral, Nightly  vilazodone, 40 mg, Oral, Daily    Infusions  sodium chloride, 75 mL/hr, Last Rate: 75 mL/hr (10/17/21 1115)    Diet  Diet Regular; Consistent Carbohydrate       Assessment/Plan     Active Hospital Problems    Diagnosis  POA   • **Chest pain [R07.9]  Yes   • Precordial chest pain [R07.2]  Yes   • Bipolar disorder (HCC) [F31.9]  Yes   • Depression [F32.A]  Yes   • Fibromyalgia [M79.7]  Yes   • Esophageal reflux [K21.9]  Yes   • Hyperlipidemia [E78.5]  Yes   • Chronic low back pain [M54.50, G89.29]  Yes   •  Anxiety [F41.9]  Yes   • Hypertension [I10]  Yes      Resolved Hospital Problems   No resolved problems to display.       55 y.o. female admitted with Chest pain.    Chest pain  -cardiac catheterization showed normal coronaries  Groin pain at cath site, right lower panus abdominal pain  -resolved  -ct abdomen/pelvis was negative  Probable BREE with obesity hypoventilation  -pt having difficulty obtaining outpatient sleep study  -pulmonology evaluated  -she will need outpatient follow up with them for a sleep study  -apparently qualified for home O2 with walking oximetry, but there is no documentation that I can see of this. I have asked RN to clarify.  Fibromyalgia, chronic pain, arthritis pain, chronic  low back pain, cervical pain, left shoulder pain  -CAIT evaluated  -left shoulder x-ray with degenerative changes and slight impingement of the superior margin of the humeral head with the inferior margin of the acromion.   -cervical and thoracic x-rays with degenerative changes  -MRI cervical spine pending. If stable, then will plan to discharge  -receiving ice packs, Voltaren cream, lidocaine patches, muscle relaxants  -On Neurontin 3 times daily as well  Hypertension  -well controlled  Hyperlipidemia-mostly hypertriglyceridemia  -pt is on fenofibrate micronized (LOFIBRA) 134 as an outpt  Depression/ Bipolar disorder/ADD/Anxiety  -Patient is on Neurontin, temazepam, trazodone, and Viibryd   -Holding the Adderall  Esophageal reflux  -PPI  SCDs for DVT prophylaxis.  Full code.  Discussed with patient.  Anticipate discharge home either this evening or tomorrow if mri is stable      Rudy Wilson MD  [facility]  10/17/21  12:40 EDT    I wore protective equipment throughout this patient encounter including a face mask, gloves and protective eyewear.  Hand hygiene was performed before donning protective equipment and after removal when leaving the room.

## 2021-10-17 NOTE — PROGRESS NOTES
[facility] PROGRESS NOTE      CC: Neck and left arm pain      Subjective     Interval History: Reports neck pain and left shoulder pain is better.  No acute events reported.      ROS:  Constitutional: No fever, chills  GI: No nausea, vomiting  MS: + neck pain  Neuro: No numbness, tingling, or weakness,  no balance difficulties  : No difficulty voiding, no incontinence    Objective     Vital signs in last 24 hours:  Temp:  [97.7 °F (36.5 °C)-98.9 °F (37.2 °C)] 98.9 °F (37.2 °C)  Heart Rate:  [84-95] 84  Resp:  [18] 18  BP: (117-141)/(63-95) 117/63    Intake/Output this shift:  No intake/output data recorded.    LABS:  Results from last 7 days   Lab Units 10/16/21  0908 10/15/21  0636 10/14/21  0515   WBC 10*3/mm3 7.47 5.95 8.00   HEMOGLOBIN g/dL 11.0* 9.8* 10.4*   HEMATOCRIT % 34.9 30.2* 32.5*   PLATELETS 10*3/mm3 228 190 207     Results from last 7 days   Lab Units 10/16/21  0908 10/15/21  0636 10/14/21  0515 10/13/21  0107 10/13/21  0107   SODIUM mmol/L 136 138 137   < > 140   POTASSIUM mmol/L 4.7 4.7 4.3   < > 4.3   CHLORIDE mmol/L 101 102 101   < > 102   CO2 mmol/L 22.3 24.5 25.3   < > 25.5   BUN mg/dL 17 17 24*   < > 26*   CREATININE mg/dL 0.84 0.67 0.78   < > 1.18*   CALCIUM mg/dL 8.5* 8.5* 8.7   < > 9.5   BILIRUBIN mg/dL  --   --   --   --  0.2   ALK PHOS U/L  --   --   --   --  61   ALT (SGPT) U/L  --   --   --   --  44*   AST (SGOT) U/L  --   --   --   --  47*   GLUCOSE mg/dL 211* 139* 132*   < > 119*    < > = values in this interval not displayed.         IMAGING STUDIES:  CERVICAL SPINE XR 10/15-  There appears to be solid ankylosis across the C5-6 disc space. There is advanced degenerative disc change at C3-4, C4-5, and C6-7. Vertebral height and alignment are normal. The AP view shows multilevel facet arthropathy.    THORACIC SPINE XR 10/15-  There is degenerative disc change throughout the mid to lower thoracic spine.  Vertebral height and alignment are normal. There is no widening of the  paravertebral stripes.    CERVICAL SPINE MRI 10/17-  Articulations of the skull base with C1 and C1 with C2 appear normal. At C2-3, the discs appear normal and the canal and foramina are patent.    At C3-4, there is facet arthropathy on the right. The discs and the left facet appear normal. The canal and foramina are patent.    At C4-5, there is disc narrowing with a broad posterior disc bulge and hypertrophic facet change. Ligamentum flavum thickening is observed as well. The combination creates moderate canal stenosis, narrowing the canal to about 8 mm greatest AP diameter at the midline. There is absence of CSF signal around the cord and mild cord remodeling but no cord signal abnormality. Foraminal detail is somewhat limited by motion. There appears to be prominent hypertrophic facet change on the left with moderate to severe left foraminal stenosis. The right foramen appears patent.     At C5-6, there is interbody fusion. The canal and foramina are widely patent.     At C6-7, there is disc narrowing with broad posterior disc protrusion indenting the thecal sac and creating moderate canal stenosis with absence of CSF signal around the cord and some cord remodeling but no cord signal abnormality. The canal measures about 8 mm in greatest AP diameter. The foramina appear patent.    I personally viewed and interpreted the patient's clinical data and spine imaging and discussed these results with Dr. Carbajal.    Meds reviewed/changed: Yes      Physical Exam:    General:   Awake, alert, oriented x3. Speech clear with no aphasia.   Back:    Cervical spine pain with palpation     Motor: Normal muscle strength, bulk and tone in upper and lower extremities.  Strong and equal bilaterally.  Sensation: Normal to light touch  Station and Gait:             RN reports ambulating ad gina  Extremities:   Wearing SCDs      Assessment/Plan     ASSESSMENT:      Chest pain    Precordial chest pain    Anxiety    Chronic low back  "pain    Bipolar disorder (HCC)    Depression    Esophageal reflux    Fibromyalgia    Hyperlipidemia    Hypertension    Ms. Yancey is a 56 y/o patient with a long standing history of neck and back pain that date back to 2002. She has had multiple spine surgeries with Dr. Rodriguez as well as facet and lumbar injections. She initially presented to here with chest pain but has had a negative cardiac work-up.     Cervical and thoracic spine XR are negative for any subluxation or compression deformity. There is advanced degenerative disc change at C3-4, C4-5, and C6-7, however vertebral height and alignment appear to be normal. There is also degenerative disc change throughout the mid to lower thoracic spine, however vertebral height and alignment appear to be normal here as well.     Cervical spine MRI reveals disc narrowing with broad posterior disc protrusion indenting the thecal sac and creating moderate canal stenosis at C6-7 however there is no cord signal abnormality and the foramina appear patent.      At this point, I I feel that she would certainly benefit from following up with pain management for further injections.  If she is not current with a pain management physician, her PCP can refer her and she can follow-up with us in the office once she has had an injection.  I would recommend continuing the transdermal lidocaine patches in the Robaxin on discharge as it seems that these have been helping her quite a bit.    At this point, we are okay for the patient be discharged home and will follow up with her on an outpatient basis.      PLAN:   Recommend continuing transdermal lidocaine patch Q12H to either posterior neck or left shoulder and Robaxin 750 mg PO Q6H along with her prescription pain medication.      I discussed the patient's findings and my recommendations with patient, nursing staff and Dr. Carbajal       LOS: 1 day       Eufemia Good, JUNO  10/17/2021  16:23 EDT    \"Dictated utilizing Dragon " "dictation\".    "

## 2021-10-17 NOTE — PLAN OF CARE
Goal Outcome Evaluation:            Pt meets criteria for discharge, being discharged home, will be providing discharge materials

## 2021-10-17 NOTE — DISCHARGE PLACEMENT REQUEST
"Travis Yancey (55 y.o. Female)             Date of Birth Social Security Number Address [address] Phone MRN    1965  [address] [facility] [address] [phone] [id number]    [employer] Mar[employer] Status             None        Admission Date Admission Type Admitting Provider Attending Provider Department, Room/Bed    10/13/21 Emergency Brooke Espinoza MD Snyder, Perry, MD [address], [address]1    Discharge Date Discharge Disposition Discharge Destination           Home or Self Care              Attending Provider: Rudy Wilson MD    Allergies: No Known Allergies    Isolation: None   Infection: None   Code Status: CPR   Advance Care Planning Activity    Ht: 165.1 cm (65\")   Wt: 118 kg (260 lb 8 oz)    Admission Cmt: None   Principal Problem: Chest pain [R07.9]                 Active Insurance as of 10/13/2021     Primary Coverage     Payor Plan Insurance Group Employer/Plan Group    HUMANA MEDICARE REPLACEMENT HUMANA MEDICARE REPLACEMENT [id number]     Payor Plan Address Payor Plan Phone Number Payor Plan Fax Number Effective Dates    [address] [address] [phone]  3/1/2021 - None Entered    [facility] [id number]       Subscriber Name Subscriber Birth Date Member ID       TRAVIS YANCEY 1965 [id number]           Secondary Coverage     Payor Plan Insurance Group Employer/Plan Group    [facility] [id number]     Payor Plan Address Payor Plan Phone Number Payor Plan Fax Number Effective Dates    [address]   1/1/2021 - None Entered    [facility] [id number]       Subscriber Name Subscriber Birth Date Member ID       TRAVIS YANCEY 1965 [id number]                 Emergency Contacts      (Rel.) Home Phone Work Phone Mobile Phone    BRITNEY DUTTON (Daughter) [phone] -- [phone]    LACEYLISE (Father) [phone] -- [phone]    AKI LOMAX (DAUGHER IN LAW) (Relative) [phone] -- " [phone]

## 2021-10-17 NOTE — SIGNIFICANT NOTE
10/17/21 0745   OTHER   Discipline physical therapist   Rehab Time/Intention   Session Not Performed other (see comments)  (Pt ambulating in halls and room per nursing notes[facility] will sign off.)   Pt to follow up with outpatient PT for L shoulder impingement syndrome if problem continues.

## 2021-10-17 NOTE — OUTREACH NOTE
Prep Survey      Responses   [facility] patient discharged from? [facility]   Is LACE score < 7 ? Yes   Emergency Room discharge w/ pulse ox? No   Eligibility [facility]   Date of Admission 10/13/21   Date of Discharge 10/17/21   Discharge Disposition Home or Self Care   Discharge diagnosis Chest pain    Does the patient have one of the following disease processes/diagnoses(primary or secondary)? Other   Does the patient have Home health ordered? No   Is there a DME ordered? Yes   What DME was ordered? [facility]    Prep survey completed? Yes          Estefany Izaguirre RN
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
## 2021-10-17 NOTE — PLAN OF CARE
Goal Outcome Evaluation:  Plan of Care Reviewed With: patient        Progress: no change  Outcome Summary: VSS. Pt has been taking PO norco and morphine but has mentioned with the voltaren ointment and alternating pain medication pt can tell a huge difference with pain control. Lidoderm patches to begin today. Pt has ambulated the halls through the night and wearing 02 2L NC as needed and with sleep with continuous pulse oximetry. Pt qualifies for home 02 per RT oximetry. No d/c plans at this time. MRI to be completed and neuro sx following. Will continue to monitor.

## 2021-10-17 NOTE — PLAN OF CARE
Goal Outcome Evaluation:           Progress: improving  Outcome Summary: VSS; PRN meds for pain, more meds added per neurosurgery and attending; ambulated in room; continue to monitor.

## 2021-10-18 ENCOUNTER — TELEPHONE (OUTPATIENT)
Dept: NEUROSURGERY | Facility: CLINIC | Age: 56
End: 2021-10-18

## 2021-10-18 ENCOUNTER — TRANSITIONAL CARE MANAGEMENT TELEPHONE ENCOUNTER (OUTPATIENT)
Dept: CALL CENTER | Facility: HOSPITAL | Age: 56
End: 2021-10-18

## 2021-10-18 ENCOUNTER — OFFICE VISIT (OUTPATIENT)
Dept: FAMILY MEDICINE CLINIC | Age: 56
End: 2021-10-18

## 2021-10-18 VITALS
OXYGEN SATURATION: 99 % | DIASTOLIC BLOOD PRESSURE: 51 MMHG | HEIGHT: 65 IN | WEIGHT: 254.8 LBS | SYSTOLIC BLOOD PRESSURE: 108 MMHG | HEART RATE: 84 BPM | BODY MASS INDEX: 42.45 KG/M2

## 2021-10-18 DIAGNOSIS — Z12.31 ENCOUNTER FOR SCREENING MAMMOGRAM FOR MALIGNANT NEOPLASM OF BREAST: ICD-10-CM

## 2021-10-18 DIAGNOSIS — Z01.419 WELL WOMAN EXAM WITH ROUTINE GYNECOLOGICAL EXAM: Primary | ICD-10-CM

## 2021-10-18 DIAGNOSIS — Z78.0 POSTMENOPAUSAL: ICD-10-CM

## 2021-10-18 DIAGNOSIS — Z11.59 SCREENING FOR VIRAL DISEASE: ICD-10-CM

## 2021-10-18 DIAGNOSIS — E11.69 TYPE 2 DIABETES MELLITUS WITH OTHER SPECIFIED COMPLICATION, WITHOUT LONG-TERM CURRENT USE OF INSULIN (HCC): ICD-10-CM

## 2021-10-18 DIAGNOSIS — N30.00 ACUTE CYSTITIS WITHOUT HEMATURIA: ICD-10-CM

## 2021-10-18 LAB
BILIRUB BLD-MCNC: NEGATIVE MG/DL
CLARITY, POC: CLEAR
COLOR UR: YELLOW
EXPIRATION DATE: ABNORMAL
GLUCOSE UR STRIP-MCNC: NEGATIVE MG/DL
KETONES UR QL: NEGATIVE
LEUKOCYTE EST, POC: ABNORMAL
Lab: ABNORMAL
NITRITE UR-MCNC: POSITIVE MG/ML
PH UR: 5 [PH] (ref 5–8)
PROT UR STRIP-MCNC: NEGATIVE MG/DL
RBC # UR STRIP: NEGATIVE /UL
SP GR UR: 1.02 (ref 1–1.03)
UROBILINOGEN UR QL: NORMAL

## 2021-10-18 PROCEDURE — G0123 SCREEN CERV/VAG THIN LAYER: HCPCS | Performed by: NURSE PRACTITIONER

## 2021-10-18 PROCEDURE — 82043 UR ALBUMIN QUANTITATIVE: CPT | Performed by: NURSE PRACTITIONER

## 2021-10-18 PROCEDURE — 87077 CULTURE AEROBIC IDENTIFY: CPT | Performed by: NURSE PRACTITIONER

## 2021-10-18 PROCEDURE — 99213 OFFICE O/P EST LOW 20 MIN: CPT | Performed by: NURSE PRACTITIONER

## 2021-10-18 PROCEDURE — 81003 URINALYSIS AUTO W/O SCOPE: CPT | Performed by: NURSE PRACTITIONER

## 2021-10-18 PROCEDURE — 87086 URINE CULTURE/COLONY COUNT: CPT | Performed by: NURSE PRACTITIONER

## 2021-10-18 PROCEDURE — 87186 SC STD MICRODIL/AGAR DIL: CPT | Performed by: NURSE PRACTITIONER

## 2021-10-18 PROCEDURE — 99396 PREV VISIT EST AGE 40-64: CPT | Performed by: NURSE PRACTITIONER

## 2021-10-18 RX ORDER — IBUPROFEN 600 MG/1
TABLET ORAL
COMMUNITY
Start: 2021-10-08 | End: 2022-02-17 | Stop reason: SDUPTHER

## 2021-10-18 RX ORDER — DILTIAZEM HYDROCHLORIDE 60 MG/1
TABLET, FILM COATED ORAL
COMMUNITY
Start: 2021-10-08 | End: 2021-12-09

## 2021-10-18 RX ORDER — CETIRIZINE HYDROCHLORIDE 10 MG/1
10 TABLET ORAL DAILY
COMMUNITY
End: 2022-02-17 | Stop reason: SDUPTHER

## 2021-10-18 RX ORDER — NITROFURANTOIN 25; 75 MG/1; MG/1
100 CAPSULE ORAL 2 TIMES DAILY
Qty: 20 CAPSULE | Refills: 0 | Status: SHIPPED | OUTPATIENT
Start: 2021-10-18 | End: 2022-02-17 | Stop reason: SDUPTHER

## 2021-10-18 NOTE — TELEPHONE ENCOUNTER
Caller: DESIRAE MAHONEY    Relationship to patient: SELF    Best call back number: [phone]    Patient is needing: PATIENT WAS SEEN IN THE ED ON 10/15/21 AND CONSULTED WITH JUNO ROGEL. PER DISCHARGE SUMMARY PATIENT IS TO FOLLOW UP WITH DR. ODONNELL WITHIN 3-4 WEEKS. HUB UNABLE TO SCHEDULE IN THAT TIME FRAME, PLEASE REVIEW AND ADVISE. MRI CERVICAL SPINE 10/17/21 @  [facility].
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
## 2021-10-18 NOTE — CASE MANAGEMENT/SOCIAL WORK
Case Management Discharge Note      Final Note: Home with 02 through [facility]-confirmed with [facility]. Tresa Esquivel CSW         Selected Continued Care - Discharged on 10/17/2021 Admission date: 10/13/2021 - Discharge disposition: Home or Self Care    Destination    No services have been selected for the patient.              Durable Medical Equipment    No services have been selected for the patient.              Dialysis/Infusion    No services have been selected for the patient.              Home Medical Care    No services have been selected for the patient.              Therapy    No services have been selected for the patient.              Community Resources    No services have been selected for the patient.              Community & DME    No services have been selected for the patient.                       Final Discharge Disposition Code: 01 - home or self-care

## 2021-10-18 NOTE — PROGRESS NOTES
"Chief Complaint  Gynecologic Exam    Subjective          Travis Yancey presents to [facility]  Gynecologic Exam  The patient's primary symptoms include pelvic pain. The patient's pertinent negatives include no genital itching. This is a recurrent problem. The current episode started more than 1 month ago. The problem occurs intermittently. The problem has been waxing and waning. The pain is moderate. The problem affects both sides. She is not pregnant. Associated symptoms include abdominal pain, back pain, dysuria, frequency and urgency. Pertinent negatives include no fever or flank pain. The symptoms are aggravated by urinating. She has tried NSAIDs for the symptoms. The treatment provided no relief. She is not sexually active. She uses abstinence and tubal ligation for contraception. She is postmenopausal.       Objective   Vital Signs:   /51 (BP Location: Right arm, Patient Position: Sitting, Cuff Size: Large Adult)   Pulse 84   Ht 165.1 cm (65\")   Wt 116 kg (254 lb 12.8 oz)   SpO2 99%   BMI 42.40 kg/m²     Physical Exam  HENT:      Head: Normocephalic.   Cardiovascular:      Rate and Rhythm: Normal rate and regular rhythm.      Pulses: Normal pulses.      Heart sounds: Normal heart sounds.   Pulmonary:      Effort: Pulmonary effort is normal. No respiratory distress.      Breath sounds: Normal breath sounds. No stridor. No wheezing, rhonchi or rales.   Chest:   Breasts:      Right: Normal. No inverted nipple or nipple discharge.      Left: Normal. No inverted nipple or nipple discharge.       Abdominal:      General: Abdomen is flat. Bowel sounds are normal.   Genitourinary:     Exam position: Supine.      Pubic Area: No rash.       Vagina: Tenderness present.      Cervix: Normal.      Rectum: Guaiac result negative.   Skin:     General: Skin is warm and dry.   Neurological:      Mental Status: She is alert and oriented to person, place, and time.      Sensory: " Sensation is intact.      Coordination: Coordination is intact. Romberg sign negative.      Gait: Gait is intact.   Psychiatric:         Attention and Perception: Attention normal.         Mood and Affect: Mood normal. Affect is tearful.         Speech: Speech normal.         Behavior: Behavior normal. Behavior is cooperative.         Thought Content: Thought content normal.         Cognition and Memory: Cognition and memory normal.         Judgment: Judgment normal.        Result Review :                 Assessment and Plan    Diagnoses and all orders for this visit:    1. Well woman exam with routine gynecological exam (Primary)  -     POCT urinalysis dipstick, automated  -     PAP, Liquid Based ([facility] Only); Future  -     PAP, Liquid Based ([facility] Only)    2. Acute cystitis without hematuria  -     nitrofurantoin, macrocrystal-monohydrate, (Macrobid) 100 MG capsule; Take 1 capsule by mouth 2 (Two) Times a Day.  Dispense: 20 capsule; Refill: 0  -     Urine Culture - Urine, Urine, Clean Catch; Future  -     Urine Culture - Urine, Urine, Clean Catch    3. Type 2 diabetes mellitus with other specified complication, without long-term current use of insulin (HCC)  -     MicroAlbumin, Urine, Random - Urine, Clean Catch; Future  -     MicroAlbumin, Urine, Random - Urine, Clean Catch    4. Postmenopausal    5. Screening for viral disease  -     Hepatitis C antibody; Future    6. Encounter for screening mammogram for malignant neoplasm of breast  -     Mammo Screening Bilateral With CAD        Follow Up   No follow-ups on file.  Patient was given instructions and counseling regarding her condition or for health maintenance advice. Please see specific information pulled into the AVS if appropriate.

## 2021-10-18 NOTE — TELEPHONE ENCOUNTER
----- Message from JUNO Melendez sent at 10/17/2021  4:31 PM EDT -----  Regarding: New patient appt  Patient admitted to the ED for chest pain but on the day of discharge she began to complain of neck pain and left shoulder pain.  She has a history of back and neck pain since 2002.  She has had multiple back and neck surgeries with Dr. Rodriguez along with facet injections and LESI's.  I ordered lidocaine patches and muscle relaxers for her in the hospital and encouraged her to get a referral to pain management through her PCP and she could follow-up with us after her injections.    Cervical spine MRI showed some disc bulge and ligamentum flavum thickening at C4-5 which causes some moderate stenosis and some canal stenosis at C6-7 because of a disc protrusion and facet joint arthritis at C3-4    Schedule a new patient appointment for approximately 6 weeks to 8 weeks

## 2021-10-19 LAB — ALBUMIN UR-MCNC: <1.2 MG/DL

## 2021-10-21 ENCOUNTER — TRANSCRIBE ORDERS (OUTPATIENT)
Dept: FAMILY MEDICINE CLINIC | Age: 56
End: 2021-10-21

## 2021-10-21 DIAGNOSIS — Z12.31 SCREENING MAMMOGRAM, ENCOUNTER FOR: Primary | ICD-10-CM

## 2021-10-21 LAB
BACTERIA SPEC AEROBE CULT: ABNORMAL
BACTERIA SPEC AEROBE CULT: ABNORMAL

## 2021-10-21 RX ORDER — LEVOFLOXACIN 250 MG/1
250 TABLET ORAL DAILY
Qty: 3 TABLET | Refills: 0 | Status: SHIPPED | OUTPATIENT
Start: 2021-10-21 | End: 2022-02-17 | Stop reason: SDUPTHER

## 2021-10-22 ENCOUNTER — TELEPHONE (OUTPATIENT)
Dept: FAMILY MEDICINE CLINIC | Age: 56
End: 2021-10-22

## 2021-10-22 DIAGNOSIS — B37.9 ANTIBIOTIC-INDUCED YEAST INFECTION: ICD-10-CM

## 2021-10-22 DIAGNOSIS — R11.0 NAUSEA: Primary | ICD-10-CM

## 2021-10-22 DIAGNOSIS — T36.95XA ANTIBIOTIC-INDUCED YEAST INFECTION: ICD-10-CM

## 2021-10-22 LAB
CYTOLOGIST CVX/VAG CYTO: NORMAL
CYTOLOGY CVX/VAG DOC CYTO: NORMAL
DX ICD CODE: NORMAL
HIV 1 & 2 AB SER-IMP: NORMAL
OTHER STN SPEC: NORMAL
STAT OF ADQ CVX/VAG CYTO-IMP: NORMAL

## 2021-10-22 RX ORDER — ONDANSETRON 4 MG/1
4 TABLET, ORALLY DISINTEGRATING ORAL EVERY 8 HOURS PRN
Qty: 15 TABLET | Refills: 1 | Status: SHIPPED | OUTPATIENT
Start: 2021-10-22 | End: 2022-01-18

## 2021-10-22 RX ORDER — FLUCONAZOLE 150 MG/1
150 TABLET ORAL ONCE
Qty: 1 TABLET | Refills: 0 | Status: SHIPPED | OUTPATIENT
Start: 2021-10-22 | End: 2021-10-22

## 2021-10-22 NOTE — TELEPHONE ENCOUNTER
Caller: Travis Yancey    Relationship: Self    Best call back number: [phone]    What medication are you requesting: WOULD LIKE A MEDICATION FOR NAUSEA FROM THE ANTIBIOTICS AND WOULD LIKE A MEDICATION FOR YEAST INFECTION FROM THE ANTIBIOTICS  What are your current symptoms: NAUSEA      Have you had these symptoms before:    [x] Yes  [] No    Have you been treated for these symptoms before:   [x] Yes  [] No    If a prescription is needed, what is your preferred pharmacy and phone number: [id number] [place] [id number] AT [facility] OF [address] &  - [phone]  - [phone] FX  [phone]     Additional notes:PATIENT SAID SHE HAS BEEN EATING WITH HER ANTIBIOTICS AND IS STILL NAUSEOUS. SHE SAID SHE OFTEN GETS YEAST INFECTIONS WHEN TAKING ANTIBIOTICS, TOO.

## 2021-10-25 ENCOUNTER — TELEPHONE (OUTPATIENT)
Dept: FAMILY MEDICINE CLINIC | Age: 56
End: 2021-10-25

## 2021-10-25 NOTE — TELEPHONE ENCOUNTER
Caller: Travis Yancey    Relationship: Self    Best call back number: [phone]    What is the best time to reach you: ANY    Who are you requesting to speak with (clinical staff, provider,  specific staff member): CLINICAL    What was the call regarding: PATIENT WOULD LIKE TO WITHDRAW REFERRAL FOR SLEEP STUDY MADE BY DR JOY. SHE WAS SEEN INPATIENT AT [facility] AND WAS GIVEN REFERRALS FOR SEVERAL SPECIALITIES, INCLUDING SLEEP STUDY AT [facility]. DR. JOY REFERRED HER TO [facility]. PLEASE CALL WHEN CANCELLED    Do you require a callback: YES

## 2021-10-26 ENCOUNTER — OFFICE VISIT (OUTPATIENT)
Dept: CARDIOLOGY | Facility: CLINIC | Age: 56
End: 2021-10-26

## 2021-10-26 ENCOUNTER — OFFICE VISIT (OUTPATIENT)
Dept: FAMILY MEDICINE CLINIC | Age: 56
End: 2021-10-26

## 2021-10-26 VITALS
BODY MASS INDEX: 42.19 KG/M2 | OXYGEN SATURATION: 98 % | SYSTOLIC BLOOD PRESSURE: 128 MMHG | TEMPERATURE: 98.8 F | HEIGHT: 65 IN | HEART RATE: 108 BPM | DIASTOLIC BLOOD PRESSURE: 78 MMHG | WEIGHT: 253.2 LBS

## 2021-10-26 VITALS
SYSTOLIC BLOOD PRESSURE: 100 MMHG | DIASTOLIC BLOOD PRESSURE: 70 MMHG | BODY MASS INDEX: 39.65 KG/M2 | HEART RATE: 102 BPM | HEIGHT: 65 IN | WEIGHT: 238 LBS

## 2021-10-26 DIAGNOSIS — M48.02 CERVICAL SPINAL STENOSIS: ICD-10-CM

## 2021-10-26 DIAGNOSIS — E66.2 OBESITY HYPOVENTILATION SYNDROME (HCC): ICD-10-CM

## 2021-10-26 DIAGNOSIS — G47.33 OBSTRUCTIVE SLEEP APNEA: ICD-10-CM

## 2021-10-26 DIAGNOSIS — F41.9 ANXIETY: ICD-10-CM

## 2021-10-26 DIAGNOSIS — E78.2 MIXED HYPERLIPIDEMIA: ICD-10-CM

## 2021-10-26 DIAGNOSIS — R07.9 CHEST PAIN, UNSPECIFIED TYPE: Primary | ICD-10-CM

## 2021-10-26 DIAGNOSIS — I50.32 CHRONIC DIASTOLIC (CONGESTIVE) HEART FAILURE (HCC): ICD-10-CM

## 2021-10-26 DIAGNOSIS — Z23 ENCOUNTER FOR IMMUNIZATION: ICD-10-CM

## 2021-10-26 DIAGNOSIS — E66.01 MORBID OBESITY WITH BMI OF 40.0-44.9, ADULT (HCC): ICD-10-CM

## 2021-10-26 DIAGNOSIS — E11.42 TYPE 2 DIABETES MELLITUS WITH DIABETIC POLYNEUROPATHY, WITHOUT LONG-TERM CURRENT USE OF INSULIN (HCC): ICD-10-CM

## 2021-10-26 DIAGNOSIS — I10 PRIMARY HYPERTENSION: Primary | ICD-10-CM

## 2021-10-26 DIAGNOSIS — I10 ESSENTIAL HYPERTENSION: ICD-10-CM

## 2021-10-26 PROBLEM — N17.9 AKI (ACUTE KIDNEY INJURY): Status: ACTIVE | Noted: 2017-01-24

## 2021-10-26 PROBLEM — R55 SYNCOPE AND COLLAPSE: Status: ACTIVE | Noted: 2017-01-24

## 2021-10-26 PROCEDURE — 99214 OFFICE O/P EST MOD 30 MIN: CPT | Performed by: NURSE PRACTITIONER

## 2021-10-26 PROCEDURE — 93000 ELECTROCARDIOGRAM COMPLETE: CPT | Performed by: NURSE PRACTITIONER

## 2021-10-26 PROCEDURE — 90732 PPSV23 VACC 2 YRS+ SUBQ/IM: CPT | Performed by: FAMILY MEDICINE

## 2021-10-26 PROCEDURE — G0009 ADMIN PNEUMOCOCCAL VACCINE: HCPCS | Performed by: FAMILY MEDICINE

## 2021-10-26 PROCEDURE — 1111F DSCHRG MED/CURRENT MED MERGE: CPT | Performed by: FAMILY MEDICINE

## 2021-10-26 PROCEDURE — 99495 TRANSJ CARE MGMT MOD F2F 14D: CPT | Performed by: FAMILY MEDICINE

## 2021-10-26 NOTE — PROGRESS NOTES
Travis Yancey presents to [facility].    Chief Complaint:  Follow up chest pain, multiple issues    TRANSITIONAL CARE MANAGEMENT:   -Travis is in today for follow-up from her recent hospital admission at [facility].   -She was admitted on 10/13/2021 with discharge on 10/17/2021.   -She was cared for by Rudy Wilson MD while inpatient.    -Her history from [facility] has been reviewed.  Her problems and medications have been reconciled.   -Staff from [facility] reached out to Travis within 48 hours of her hospital discharge.    Subjective       History of Present Illness:  Travis is in today for follow-up after her recent hospital stay.  Please see her most recent visit with me.  She ended up being admitted to [facility] overnight.  However, she had ongoing symptoms and she presented to [facility] emergency department for evaluation.  She was admitted and evaluated thoroughly there.  She was kept about 6 days.  She did have a cardiac catheterization that was negative.  Her coronary arteries were resulted as being normal.    Travis also was evaluated by pulmonology while in the hospital.  She was felt to have significant sleep apnea as well as possible obesity hypoventilation.  She was discharged home on supplemental oxygen and continues on that.  She says that she wears oxygen all night.  She does feel better with regard to her breathing since going on oxygen.    In addition, she was noted to have cervical spinal stenosis on MRI.  She will be seeing Dr. Carbajal from neurosurgery for evaluation.      Review of Systems:  Review of Systems   Constitutional: Negative for chills and fever.   Respiratory: Positive for shortness of breath. Negative for cough.    Cardiovascular: Positive for chest pain (hurts sometimes). Negative for palpitations.   Gastrointestinal: Negative for abdominal pain, nausea and vomiting.        Objective   Medical History:  Past Medical  History:   • ADHD (attention deficit hyperactivity disorder)   • Anxiety   • Arthritis   • Depression   • Diabetes mellitus (HCC)   • Fibromyalgia   • Hypertension     Past Surgical History:   • APPENDECTOMY   • CARDIAC CATHETERIZATION    Procedure: RIGHT AND LEFT HEART CATH;  Surgeon: Jennifer Wilcox MD;  Location:  [facility];  Service: Cardiology;  Laterality: Left;  right and left cardiac catheterization   • CARDIAC CATHETERIZATION    Procedure: Left ventriculography;  Surgeon: Jennifer Wilcox MD;  Location:  [facility];  Service: Cardiology;  Laterality: N/A;   • CARDIAC CATHETERIZATION    Procedure: Coronary angiography;  Surgeon: Jnenifer Wilcox MD;  Location:  [facility];  Service: Cardiology;  Laterality: N/A;   • CERVICAL FUSION    c5-6   •  SECTION   • CHOLECYSTECTOMY   • DISCECTOMY POSTERIOR THORACIC TRANSPEDICULAR APPROACH   • PARTIAL HIP ARTHROPLASTY      History reviewed. No pertinent family history.  Social History     Tobacco Use   • Smoking status: Former Smoker     Types: Cigarettes     Quit date: 2019     Years since quittin.1   • Smokeless tobacco: Never Used   • Tobacco comment: Intermitt. starting age 22, 3 packs a wk   Substance Use Topics   • Alcohol use: Not Currently       Health Maintenance Due   Topic Date Due   • Pneumococcal Vaccine 0-64 (1 of 2 - PPSV23) Never done   • TDAP/TD VACCINES (1 - Tdap) Never done   • HEPATITIS C SCREENING  Never done   • DIABETIC EYE EXAM  Never done   • MAMMOGRAM  2020        Immunization History   Administered Date(s) Administered   • COVID-19 (ALEX) 06/15/2021   • Flu Vaccine Split Quad 10/06/2021   • Influenza, Unspecified 2020   • Shingrix 10/06/2021   • flucelvax quad pfs =>4 YRS 2020       No Known Allergies     Medications:  Current Outpatient Medications on File Prior to Visit   Medication Sig   • amphetamine-dextroamphetamine (ADDERALL) 30 MG tablet Take  1 tablet by mouth 2 (two) times a day.   • cetirizine (zyrTEC) 10 MG tablet Take 10 mg by mouth Daily.   • clonazePAM (KlonoPIN) 1 MG tablet Take 1 tablet by mouth 3 (Three) Times a Day.   • Diclofenac Sodium (VOLTAREN) 1 % gel gel Apply 2 g topically to the appropriate area as directed 3 (Three) Times a Day for 30 days.   • fenofibrate micronized (LOFIBRA) 134 MG capsule Take 1 capsule by mouth Daily.   • fluticasone (FLONASE) 50 MCG/ACT nasal spray 2 sprays into the nostril(s) as directed by provider Daily.   • furosemide (LASIX) 40 MG tablet Take 1 tablet by mouth Daily.   • gabapentin (NEURONTIN) 600 MG tablet Take 1 tablet by mouth 3 (Three) Times a Day.   • ibuprofen (ADVIL,MOTRIN) 600 MG tablet    • levocetirizine (XYZAL) 5 MG tablet Take 0.5 tablets by mouth Daily.   • levoFLOXacin (Levaquin) 250 MG tablet Take 1 tablet by mouth Daily.   • lidocaine (LIDODERM) 5 % Place 1 patch on the skin as directed by provider Daily for 30 days. Remove & Discard patch within 12 hours or as directed by MD   • metFORMIN (GLUCOPHAGE) 500 MG tablet Take 2 tablets by mouth 2 (two) times a day.   • methocarbamol (ROBAXIN) 750 MG tablet Take 1 tablet by mouth 4 (Four) Times a Day for 14 days.   • metoprolol tartrate (LOPRESSOR) 25 MG tablet Take 1 tablet by mouth 2 (Two) Times a Day.   • nitrofurantoin, macrocrystal-monohydrate, (Macrobid) 100 MG capsule Take 1 capsule by mouth 2 (Two) Times a Day.   • ondansetron ODT (Zofran ODT) 4 MG disintegrating tablet Place 1 tablet on the tongue Every 8 (Eight) Hours As Needed for Nausea or Vomiting.   • pantoprazole (PROTONIX) 40 MG EC tablet Take 1 tablet by mouth Daily.   • potassium chloride (MICRO-K) 10 MEQ CR capsule Take 1 capsule by mouth Daily. (Patient taking differently: Take 10 mEq by mouth Daily. Take with lasix)   • Symbicort 80-4.5 MCG/ACT inhaler    • temazepam (RESTORIL) 30 MG capsule Take 1 capsule by mouth Every Night.   • traZODone (DESYREL) 50 MG tablet Take 1 tablet  "by mouth At Night As Needed for Sleep.   • Viibryd 40 MG tablet tablet Take 1 tablet by mouth Daily.     No current facility-administered medications on file prior to visit.       Vital Signs:   Ht 165.1 cm (65\")   Wt 115 kg (253 lb 3.2 oz)   BMI 42.13 kg/m²       Physical Exam:  Physical Exam  Vitals and nursing note reviewed.   Constitutional:       General: She is not in acute distress.     Appearance: She is obese. She is not ill-appearing.   HENT:      Right Ear: Tympanic membrane and ear canal normal.      Left Ear: Tympanic membrane and ear canal normal.      Mouth/Throat:      Mouth: Mucous membranes are moist.      Comments: Pharynx appears normal  Eyes:      Extraocular Movements: Extraocular movements intact.      Pupils: Pupils are equal, round, and reactive to light.   Neck:      Thyroid: No thyromegaly.   Cardiovascular:      Rate and Rhythm: Normal rate and regular rhythm.      Pulses:           Dorsalis pedis pulses are 2+ on the right side and 2+ on the left side.      Heart sounds: No murmur heard.      Pulmonary:      Effort: Pulmonary effort is normal.      Breath sounds: Normal breath sounds.   Abdominal:      General: There is no distension.      Palpations: Abdomen is soft. There is no mass.      Tenderness: There is no abdominal tenderness.   Musculoskeletal:      Cervical back: Normal range of motion.   Feet:      Right foot:      Protective Sensation: 3 sites tested. 3 sites sensed.      Skin integrity: Callus present.      Left foot:      Protective Sensation: 3 sites tested. 3 sites sensed.      Skin integrity: Callus present.      Comments: There is some decreased sensation in the feet.  Skin:     Findings: No lesion or rash.   Neurological:      General: No focal deficit present.      Mental Status: She is oriented to person, place, and time.      Cranial Nerves: No cranial nerve deficit.   Psychiatric:         Mood and Affect: Mood normal.         Result Review      The following " data was reviewed by Jeferson Harmon MD on 10/26/2021.  Lab Results   Component Value Date    WBC 7.47 10/16/2021    HGB 11.0 (L) 10/16/2021    HCT 34.9 10/16/2021    MCV 83.5 10/16/2021     10/16/2021     Lab Results   Component Value Date    GLUCOSE 211 (H) 10/16/2021    BUN 17 10/16/2021    CREATININE 0.84 10/16/2021     10/16/2021    K 4.7 10/16/2021     10/16/2021    CO2 22.3 10/16/2021    CALCIUM 8.5 (L) 10/16/2021    PROTEINTOT 7.6 10/13/2021    ALBUMIN 4.70 10/13/2021    ALT 44 (H) 10/13/2021    AST 47 (H) 10/13/2021    ALKPHOS 61 10/13/2021    BILITOT 0.2 10/13/2021    EGFRIFNONA 71 10/16/2021    GLOB 2.9 10/13/2021    AGRATIO 1.6 10/13/2021    BCR 20.2 10/16/2021    ANIONGAP 12.7 10/16/2021      Lab Results   Component Value Date    CHOL 130 08/25/2021    TRIG 311 (H) 08/25/2021    HDL 37 (L) 08/25/2021    LDL 46 08/25/2021     Lab Results   Component Value Date    TSH 1.600 08/25/2021     Lab Results   Component Value Date    HGBA1C 6.20 (H) 08/25/2021            Assessment and Plan:   Today, we have reviewed her care.  Fortunately, her coronary arteries are in good shape.  She does have several other problems that are being evaluated including presumed sleep apnea, obesity hypoventilation, cervical spinal stenosis.  Her other medical problems seem relatively stable at this time.  Her most recent labs have been reviewed as above.  For now, we will advise no specific changes for her diabetes, cholesterol, blood pressure, and heart related issues.  She does have multiple specialty appointments upcoming.  I have recommended she keep those.  In addition, the problem with obesity is an overarching problem contributing to most of her medical issues.  I think weight loss is a vital component of her care.  I also think with her other health issues that it will be challenging for her to lose weight through typical means.  We will try to make referral for bariatric evaluation.  No other  short-term change is anticipated.       Diagnoses and all orders for this visit:    1. Chest pain, unspecified type (Primary)    2. Obesity hypoventilation syndrome (HCC)  -     Ambulatory Referral to Bariatric Surgery    3. Obstructive sleep apnea  -     Ambulatory Referral to Bariatric Surgery    4. Cervical spinal stenosis    5. Type 2 diabetes mellitus with diabetic polyneuropathy, without long-term current use of insulin (HCC)  -     Ambulatory Referral to Bariatric Surgery    6. Mixed hyperlipidemia    7. Essential hypertension    8. Chronic diastolic (congestive) heart failure (HCC)    9. Encounter for immunization  -     Pneumococcal Polysaccharide Vaccine 23-Valent Greater Than or Equal To 3yo Subcutaneous / IM    10. Morbid obesity with BMI of 40.0-44.9, adult (HCC)  -     Ambulatory Referral to Bariatric Surgery          Follow Up   Return in about 3 months (around 1/26/2022).  Patient was given instructions and counseling regarding her condition or for health maintenance advice. Please see specific information pulled into the AVS if appropriate.

## 2021-10-26 NOTE — PATIENT INSTRUCTIONS
Diabetic Neuropathy  Diabetic neuropathy refers to nerve damage that is caused by diabetes. Over time, people with diabetes can develop nerve damage throughout the body. There are several types of diabetic neuropathy:  · Peripheral neuropathy. This is the most common type of diabetic neuropathy. It damages the nerves that carry signals between the spinal cord and other parts of the body (peripheral nerves). This usually affects nerves in the feet, legs, hands, and arms.  · Autonomic neuropathy. This type causes damage to nerves that control involuntary functions (autonomic nerves). Involuntary functions are functions of the body that you do not control. They include heartbeat, body temperature, blood pressure, urination, digestion, sweating, sexual function, or response to changes in blood glucose.  · Focal neuropathy. This type of nerve damage affects one area of the body, such as an arm, a leg, or the face. The injury may involve one nerve or a small group of nerves. Focal neuropathy can be painful and unpredictable. It occurs most often in older adults with diabetes. This often develops suddenly, but usually improves over time and does not cause long-term problems.  · Proximal neuropathy. This type of nerve damage affects the nerves of the thighs, hips, buttocks, or legs. It causes severe pain, weakness, and muscle death (atrophy), usually in the thigh muscles. It is more common among older men and people who have type 2 diabetes. The length of recovery time may vary.  What are the causes?  Peripheral, autonomic, and focal neuropathies are caused by diabetes that is not well controlled with treatment. The cause of proximal neuropathy is not known, but it may be caused by inflammation related to uncontrolled blood glucose levels.  What are the signs or symptoms?  Peripheral neuropathy  Peripheral neuropathy develops slowly over time. When the nerves of the feet and legs no longer work, you may  experience:  · Burning, stabbing, or aching pain in the legs or feet.  · Pain or cramping in the legs or feet.  · Loss of feeling (numbness) and inability to feel pressure or pain in the feet. This can lead to:  ? Thick calluses or sores on areas of constant pressure.  ? Ulcers.  ? Reduced ability to feel temperature changes.  · Foot deformities.  · Muscle weakness.  · Loss of balance or coordination.  Autonomic neuropathy  The symptoms of autonomic neuropathy vary depending on which nerves are affected. Symptoms may include:  · Problems with digestion, such as:  ? Nausea or vomiting.  ? Poor appetite.  ? Bloating.  ? Diarrhea or constipation.  ? Trouble swallowing.  ? Losing weight without trying to.  · Problems with the heart, blood, and lungs, such as:  ? Dizziness, especially when standing up.  ? Fainting.  ? Shortness of breath.  ? Irregular heartbeat.  · Bladder problems, such as:  ? Trouble starting or stopping urination.  ? Leaking urine.  ? Trouble emptying the bladder.  ? Urinary tract infections (UTIs).  · Problems with other body functions, such as:  ? Sweat. You may sweat too much or too little.  ? Temperature. You might get hot easily. Or, you might feel cold more than usual.  ? Sexual function. Men may not be able to get or maintain an erection. Women may have vaginal dryness and difficulty with arousal.  Focal neuropathy  Symptoms affect only one area of the body. Common symptoms include:  · Numbness.  · Tingling.  · Burning pain.  · Prickling feeling.  · Very sensitive skin.  · Weakness.  · Inability to move (paralysis).  · Muscle twitching.  · Muscles getting smaller (wasting).  · Poor coordination.  · Double or blurred vision.  Proximal neuropathy  · Sudden, severe pain in the hip, thigh, or buttocks. Pain may spread from the back into the legs (sciatica).  · Pain and numbness in the arms and legs.  · Tingling.  · Loss of bladder control or bowel control.  · Weakness and wasting of thigh  muscles.  · Difficulty getting up from a seated position.  · Abdominal swelling.  · Unexplained weight loss.  How is this diagnosed?  Diagnosis varies depending on the type of neuropathy your health care provider suspects.  Peripheral neuropathy  Your health care provider will do a neurologic exam. This exam checks your reflexes, how you move, and what you can feel. You may have other tests, such as:  · Blood tests.  · Tests of the fluid that surrounds the spinal cord (lumbar puncture).  · CT scan.  · MRI.  · Checking the nerves that control muscles (electromyogram, or EMG).  · Checking how quickly signals pass through your nerves (nerve conduction study).  · Checking a small piece of a nerve using a microscope (biopsy).  Autonomic neuropathy  You may have tests, such as:  · Tests to measure your blood pressure and heart rate. You may be secured to an exam table that moves you from a lying position to an upright position (table tilt test).  · Breathing tests to check your lungs.  · Tests to check how food moves through the digestive system (gastric emptying tests).  · Blood, sweat, or urine tests.  · Ultrasound of your bladder.  · Spinal fluid tests.  Focal neuropathy  This condition may be diagnosed with:  · A neurologic exam.  · CT scan.  · MRI.  · EMG.  · Nerve conduction study.  Proximal neuropathy  There is no test to diagnose this type of neuropathy. You may have tests to rule out other possible causes of this type of neuropathy. Tests may include:  · X-rays of your spine and lumbar region.  · Lumbar puncture.  · MRI.  How is this treated?  The goal of treatment is to keep nerve damage from getting worse. Treatment may include:  · Following your diabetes management plan. This will help keep your blood glucose level and your A1C level within your target range. This is the most important treatment.  · Using prescription pain medicine.  Follow these instructions at home:  Diabetes management  Follow your diabetes  management plan as told by your health care provider.  · Check your blood glucose levels.  · Keep your blood glucose in your target range.  · Have your A1C level checked at least two times a year, or as often as told.  · Take over the counter and prescription medicines only as told by your health care provider. This includes insulin and diabetes medicine.    Lifestyle    · Do not use any products that contain nicotine or tobacco, such as cigarettes, e-cigarettes, and chewing tobacco. If you need help quitting, ask your health care provider.  · Be physically active every day. Include strength training and balance exercises.  · Follow a healthy meal plan.  · Work with your health care provider to manage your blood pressure.    General instructions  · Ask your health care provider if the medicine prescribed to you requires you to avoid driving or using machinery.  · Check your skin and feet every day for cuts, bruises, redness, blisters, or sores.  · Keep all follow-up visits. This is important.  Contact a health care provider if:  · You have burning, stabbing, or aching pain in your legs or feet.  · You are unable to feel pressure or pain in your feet.  · You develop problems with digestion, such as:  ? Nausea.  ? Vomiting.  ? Bloating.  ? Constipation.  ? Diarrhea.  ? Abdominal pain.  · You have difficulty with urination, such as:  ? Inability to control when you urinate (incontinence).  ? Inability to completely empty the bladder (retention).  · You feel as if your heart is racing (palpitations).  · You feel dizzy, weak, or faint when you stand up.  Get help right away if:  · You cannot urinate.  · You have sudden weakness or loss of coordination.  · You have trouble speaking.  · You have pain or pressure in your chest.  · You have an irregular heartbeat.  · You have sudden inability to move a part of your body.  These symptoms may represent a serious problem that is an emergency. Do not wait to see if the symptoms  will go away. Get medical help right away. Call your local emergency services (911 in the [place] Do not drive yourself to the hospital.  Summary  · Diabetic neuropathy is nerve damage that is caused by diabetes. It can cause numbness and pain in the arms, legs, digestive tract, heart, and other body systems.  · This condition is treated by keeping your blood glucose level and your A1C level within your target range. This can help prevent neuropathy from getting worse.  · Check your skin and feet every day for cuts, bruises, redness, blisters, or sores.  · Do not use any products that contain nicotine or tobacco, such as cigarettes, e-cigarettes, and chewing tobacco.  This information is not intended to replace advice given to you by your health care provider. Make sure you discuss any questions you have with your health care provider.  Document Revised: 04/29/2021 Document Reviewed: 04/29/2021  [employer] Patient Education © 2021 [employer]

## 2021-10-26 NOTE — PROGRESS NOTES
Date of Office Visit: 10/26/2021  Encounter Provider: JUNO Morrison  Place of Service: [facility] CARDIOLOGY  Patient Name: Travis Yancey  :1965    Chief Complaint   Patient presents with   • Hypertension   :     HPI: Travis Yancey is a 56 y.o. female who presents today for follow-up.  Old records have been obtained and reviewed by me.  She is a patient with a past medical history significant for anxiety, depression, ADHD, diabetes, hypertension, IBS, spinal stenosis, and chronic pain.  On 10/12/2021, she presented to the ED with intermittent chest pain and dyspnea.  Echocardiogram demonstrated normal LV systolic function and no significant valvular abnormalities.  Ultimately, she was taken for a right and left heart catheterization which demonstrated normal coronaries, normal pulmonary pressures, and mildly elevated LV filling pressures.  A pulmonary evaluation was recommended.  Subsequently, she was found to have acute respiratory failure with hypoxia, and pulmonary recommended an outpatient sleep study.  On 10/17/2021, she was stable for discharge.  She is here today for follow-up.   From a cardiac standpoint, things have been going well.  She does still have chest tightness and shortness of breath with exertion.  However, since wearing oxygen at night, she is no longer waking up gasping for air.  She has chronic and intermittent lower extremity edema that is unchanged.  She denies any palpitations, dizziness, or syncope.  She suffers from a lot of anxiety and what sounds to me like posttraumatic stress disorder.  Her son committed suicide in 2018.  Following that, she pretty much did not leave her house for an entire year.  She says this is when she gained all of her weight and became a type II diabetic.  She suffers from chronic pain in her back.  She discovered that the pain in her left shoulder is due to an impinged nerve.    Past Medical History:    Diagnosis Date   • ADHD (attention deficit hyperactivity disorder)    • Anxiety    • Arthritis    • Depression    • Diabetes mellitus (HCC)    • Fibromyalgia    • Hypertension        Past Surgical History:   Procedure Laterality Date   • APPENDECTOMY     • CARDIAC CATHETERIZATION Left 10/13/2021    Procedure: RIGHT AND LEFT HEART CATH;  Surgeon: Jennifer Wilcox MD;  Location:  [facility];  Service: Cardiology;  Laterality: Left;  right and left cardiac catheterization   • CARDIAC CATHETERIZATION N/A 10/13/2021    Procedure: Left ventriculography;  Surgeon: Jennifer Wilcox MD;  Location:  [facility];  Service: Cardiology;  Laterality: N/A;   • CARDIAC CATHETERIZATION N/A 10/13/2021    Procedure: Coronary angiography;  Surgeon: Jennifer Wilcox MD;  Location:  [facility];  Service: Cardiology;  Laterality: N/A;   • CERVICAL FUSION      c5-6   •  SECTION     • CHOLECYSTECTOMY     • DISCECTOMY POSTERIOR THORACIC TRANSPEDICULAR APPROACH     • PARTIAL HIP ARTHROPLASTY Right        Social History     Socioeconomic History   • Marital status:    Tobacco Use   • Smoking status: Former Smoker     Types: Cigarettes     Quit date: 2019     Years since quittin.1   • Smokeless tobacco: Never Used   • Tobacco comment: Intermittent from age 22 on-- 2-3 pack/ week   Vaping Use   • Vaping Use: Never used   Substance and Sexual Activity   • Alcohol use: Not Currently   • Drug use: Never       History reviewed. No pertinent family history.    Review of Systems   Constitutional: Positive for weight gain.   Cardiovascular: Positive for leg swelling. Negative for chest pain, dyspnea on exertion, orthopnea, paroxysmal nocturnal dyspnea and syncope.   Respiratory: Positive for snoring.    Hematologic/Lymphatic: Negative for bleeding problem.   Musculoskeletal: Positive for arthritis, back pain and joint pain. Negative for falls.   Gastrointestinal: Positive  for nausea. Negative for melena.   Neurological: Negative for dizziness and light-headedness.   Psychiatric/Behavioral: Positive for depression. The patient is nervous/anxious.        No Known Allergies      Current Outpatient Medications:   •  amphetamine-dextroamphetamine (ADDERALL) 30 MG tablet, Take 1 tablet by mouth 2 (two) times a day., Disp: , Rfl:   •  cetirizine (zyrTEC) 10 MG tablet, Take 10 mg by mouth Daily., Disp: , Rfl:   •  clonazePAM (KlonoPIN) 1 MG tablet, Take 1 tablet by mouth 3 (Three) Times a Day., Disp: , Rfl:   •  Diclofenac Sodium (VOLTAREN) 1 % gel gel, Apply 2 g topically to the appropriate area as directed 3 (Three) Times a Day for 30 days., Disp: 50 g, Rfl: 0  •  fenofibrate micronized (LOFIBRA) 134 MG capsule, Take 1 capsule by mouth Daily., Disp: 90 capsule, Rfl: 1  •  fluticasone (FLONASE) 50 MCG/ACT nasal spray, 2 sprays into the nostril(s) as directed by provider Daily., Disp: 48 g, Rfl: 3  •  furosemide (LASIX) 40 MG tablet, Take 1 tablet by mouth Daily., Disp: 90 tablet, Rfl: 0  •  gabapentin (NEURONTIN) 600 MG tablet, Take 1 tablet by mouth 3 (Three) Times a Day., Disp: , Rfl:   •  ibuprofen (ADVIL,MOTRIN) 600 MG tablet, , Disp: , Rfl:   •  levocetirizine (XYZAL) 5 MG tablet, Take 0.5 tablets by mouth Daily., Disp: 90 tablet, Rfl: 1  •  levoFLOXacin (Levaquin) 250 MG tablet, Take 1 tablet by mouth Daily., Disp: 3 tablet, Rfl: 0  •  lidocaine (LIDODERM) 5 %, Place 1 patch on the skin as directed by provider Daily for 30 days. Remove & Discard patch within 12 hours or as directed by MD, Disp: 30 patch, Rfl: 0  •  metFORMIN (GLUCOPHAGE) 500 MG tablet, Take 2 tablets by mouth 2 (two) times a day., Disp: , Rfl:   •  methocarbamol (ROBAXIN) 750 MG tablet, Take 1 tablet by mouth 4 (Four) Times a Day for 14 days., Disp: 56 tablet, Rfl: 0  •  metoprolol tartrate (LOPRESSOR) 25 MG tablet, Take 1 tablet by mouth 2 (Two) Times a Day., Disp: 180 tablet, Rfl: 1  •  nitrofurantoin,  "macrocrystal-monohydrate, (Macrobid) 100 MG capsule, Take 1 capsule by mouth 2 (Two) Times a Day., Disp: 20 capsule, Rfl: 0  •  ondansetron ODT (Zofran ODT) 4 MG disintegrating tablet, Place 1 tablet on the tongue Every 8 (Eight) Hours As Needed for Nausea or Vomiting., Disp: 15 tablet, Rfl: 1  •  pantoprazole (PROTONIX) 40 MG EC tablet, Take 1 tablet by mouth Daily., Disp: 90 tablet, Rfl: 1  •  potassium chloride (MICRO-K) 10 MEQ CR capsule, Take 1 capsule by mouth Daily. (Patient taking differently: Take 10 mEq by mouth Daily. Take with lasix), Disp: 90 capsule, Rfl: 1  •  Symbicort 80-4.5 MCG/ACT inhaler, , Disp: , Rfl:   •  temazepam (RESTORIL) 30 MG capsule, Take 1 capsule by mouth Every Night., Disp: , Rfl:   •  traZODone (DESYREL) 50 MG tablet, Take 1 tablet by mouth At Night As Needed for Sleep., Disp: , Rfl:   •  Viibryd 40 MG tablet tablet, Take 1 tablet by mouth Daily., Disp: , Rfl:       Objective:     Vitals:    10/26/21 1002   BP: 100/70   Pulse: 102   Weight: 108 kg (238 lb)   Height: 165.1 cm (65\")     Body mass index is 39.61 kg/m².    PHYSICAL EXAM:    Neck:      Vascular: No JVD.   Pulmonary:      Effort: Pulmonary effort is normal.      Breath sounds: Normal breath sounds.   Cardiovascular:      Normal rate. Regular rhythm.      Murmurs: There is no murmur.      No gallop. No click. No rub.   Pulses:     Intact distal pulses.           ECG 12 Lead    Date/Time: 10/26/2021 10:16 AM  Performed by: Mary Wilson APRN  Authorized by: Mary Wilson APRN   Comparison: compared with previous ECG from 10/14/2021  Similar to previous ECG  Rhythm: sinus tachycardia  Rate: tachycardic  BPM: 102    Clinical impression: normal ECG  Comments: Indication: Hypertension              Assessment:       Diagnosis Plan   1. Primary hypertension  ECG 12 Lead   2. Chronic diastolic (congestive) heart failure (HCC)     3. Obstructive sleep apnea     4. Anxiety       Orders Placed This Encounter "   Procedures   • ECG 12 Lead     This order was created via procedure documentation     Order Specific Question:   Release to patient     Answer:   Immediate          Plan:       1.  Hypertension.  Her blood pressure is actually on the lowish side.  Her heart rate is a little quick as well, she says it has been this way for years.      2.  Chronic diastolic CHF.  She appears euvolemic today.  Recent echocardiogram demonstrated normal LV systolic function.      3.  Obstructive sleep apnea.  She is scheduled for a sleep study soon.  She is currently wearing supplemental oxygen at night.      4.  Anxiety.  She already follows with a psychiatrist.  I recommended she see a therapist as well.      From a cardiac standpoint, she is stable.  She is trying to get her life back together after the tragedy with her son.  I spent a lot of time reassuring her.  I am not going to make any changes today, and she will follow-up with Dr. Elmore on 11/19.      As always, it has been a pleasure to participate in your patient's care.      Sincerely,         JUNO Schaffer

## 2021-11-08 ENCOUNTER — TELEPHONE (OUTPATIENT)
Dept: FAMILY MEDICINE CLINIC | Age: 56
End: 2021-11-08

## 2021-11-08 ENCOUNTER — TRANSCRIBE ORDERS (OUTPATIENT)
Dept: SLEEP MEDICINE | Facility: HOSPITAL | Age: 56
End: 2021-11-08

## 2021-11-08 DIAGNOSIS — R15.9 BOWEL AND BLADDER INCONTINENCE: Primary | ICD-10-CM

## 2021-11-08 DIAGNOSIS — Z01.818 OTHER SPECIFIED PRE-OPERATIVE EXAMINATION: Primary | ICD-10-CM

## 2021-11-08 DIAGNOSIS — R32 BOWEL AND BLADDER INCONTINENCE: Primary | ICD-10-CM

## 2021-11-08 PROCEDURE — 81003 URINALYSIS AUTO W/O SCOPE: CPT | Performed by: NURSE PRACTITIONER

## 2021-11-08 NOTE — TELEPHONE ENCOUNTER
Hub staff attempted to follow warm transfer process and was unsuccessful     Caller: Travis Yancey    Relationship to patient: Self    Best call back number: [phone]    Patient is needing:  THE PATIENT STATED SHE IS SCHEDULED FOR A SLEEP STUDY ON 11/12/2021 AND NEEDS TO HAVE A COVID TEST COMPLETED BY 11/09/2021 AFTERNOON. SHE WOULD LIKE A CALL BACK TO DISCUSS ASAP.

## 2021-11-08 NOTE — TELEPHONE ENCOUNTER
Caller: Travis Yancey    Relationship: Self    Best call back number: [phone]    What is the best time to reach you: ANYTIME    Who are you requesting to speak with (clinical staff, provider,  specific staff member): CLINICAL STAFF/JUNO FARR     What was the call regarding:   PATIENT IS STATING THAT SHE HAS FINISHED ONE ROUND OF ANTIBIOTICS FOR THE UTI AND STILL FEELS LIKE SHE HAS THE UTI.  WANTED TO SEE IF SHE COULD GET ANOTHER ROUND OF ANTIBIOTICS     Do you require a callback: YES

## 2021-11-08 NOTE — TELEPHONE ENCOUNTER
Pt is having a sleep study done on Friday night, pt is needing a PCR test done on Wednesday, please advise.

## 2021-11-08 NOTE — TELEPHONE ENCOUNTER
HUB TO READ    CALLED PT BACK TO GET PRE-OP CLEARANCE COVID SCHEDULED BUT PT WANTED TO SPEAK WITH NURSE FIRST, TX TO NURSE NAN. DID NOT SCHEDULE TEST.

## 2021-11-10 ENCOUNTER — APPOINTMENT (OUTPATIENT)
Dept: LAB | Facility: HOSPITAL | Age: 56
End: 2021-11-10

## 2021-11-10 ENCOUNTER — CLINICAL SUPPORT (OUTPATIENT)
Dept: FAMILY MEDICINE CLINIC | Age: 56
End: 2021-11-10

## 2021-11-10 ENCOUNTER — TELEPHONE (OUTPATIENT)
Dept: FAMILY MEDICINE CLINIC | Age: 56
End: 2021-11-10

## 2021-11-10 DIAGNOSIS — Z01.818 OTHER SPECIFIED PRE-OPERATIVE EXAMINATION: ICD-10-CM

## 2021-11-10 LAB
BILIRUB BLD-MCNC: NEGATIVE MG/DL
CLARITY, POC: CLEAR
COLOR UR: YELLOW
EXPIRATION DATE: ABNORMAL
GLUCOSE UR STRIP-MCNC: NEGATIVE MG/DL
KETONES UR QL: NEGATIVE
LEUKOCYTE EST, POC: ABNORMAL
Lab: 7004
NITRITE UR-MCNC: NEGATIVE MG/ML
PH UR: 5.5 [PH] (ref 5–8)
PROT UR STRIP-MCNC: ABNORMAL MG/DL
RBC # UR STRIP: ABNORMAL /UL
SP GR UR: 1.03 (ref 1–1.03)
UROBILINOGEN UR QL: NORMAL

## 2021-11-10 PROCEDURE — U0004 COV-19 TEST NON-CDC HGH THRU: HCPCS | Performed by: INTERNAL MEDICINE

## 2021-11-10 PROCEDURE — 99211 OFF/OP EST MAY X REQ PHY/QHP: CPT | Performed by: FAMILY MEDICINE

## 2021-11-10 NOTE — TELEPHONE ENCOUNTER
Pt states she was given methocarbamol 750mg and Lidoderm patches 5% upon discharge from hospital recently. She would like to know if you can send in refills, states ins will cover brand name lidoderm patches. With regard to muscle relaxer, she states the methocarbamol doesn't help very much, would like to know if there is a different one you can prescribe. She has taken Flexeril in past and done well with it. Please advise.

## 2021-11-11 ENCOUNTER — TELEPHONE (OUTPATIENT)
Dept: FAMILY MEDICINE CLINIC | Age: 56
End: 2021-11-11

## 2021-11-11 DIAGNOSIS — B37.31 YEAST VAGINITIS: Primary | ICD-10-CM

## 2021-11-11 LAB — SARS-COV-2 RNA NOSE QL NAA+PROBE: NOT DETECTED

## 2021-11-11 RX ORDER — FLUCONAZOLE 150 MG/1
150 TABLET ORAL ONCE
Qty: 1 TABLET | Refills: 0 | Status: SHIPPED | OUTPATIENT
Start: 2021-11-11 | End: 2021-11-11

## 2021-11-11 NOTE — TELEPHONE ENCOUNTER
Pt would like you to review urine results from yesterday. She states she is having itching and burning and urine is dark. She isn't sure if she has UTI or yeast infection. She is requesting something be sent in to pharm. Please advise.

## 2021-11-11 NOTE — PROGRESS NOTES
Urine has improved with antibiotics. Patient having itching and burning, diflucan sent to pharmacy for possible yeast infection, if no improvement, recommend patient come in vaginitis swab.

## 2021-11-12 ENCOUNTER — HOSPITAL ENCOUNTER (OUTPATIENT)
Dept: SLEEP MEDICINE | Facility: HOSPITAL | Age: 56
Discharge: HOME | End: 2021-11-12
Admitting: INTERNAL MEDICINE

## 2021-11-12 DIAGNOSIS — G47.33 OSA (OBSTRUCTIVE SLEEP APNEA): ICD-10-CM

## 2021-11-12 PROCEDURE — 95810 POLYSOM 6/> YRS 4/> PARAM: CPT

## 2021-11-12 RX ORDER — LIDOCAINE 50 MG/G
1 PATCH TOPICAL
Qty: 30 PATCH | Refills: 2 | Status: SHIPPED | OUTPATIENT
Start: 2021-11-12 | End: 2021-12-09 | Stop reason: SDUPTHER

## 2021-11-12 NOTE — TELEPHONE ENCOUNTER
I did send a refill on the lidocaine patches. However, she is on a heavy medication burden. I think it would be unwise to keep her on a muscle relaxer long-term in addition to everything else she takes. It would be best in my opinion not to continue that. Thanks.

## 2021-11-12 NOTE — TELEPHONE ENCOUNTER
Pt inf, states she didn't realize how much the muscle relaxer was helping until she ran out yesterday. She would like you to reconsider, as you are aware how much chronic pain she has. States even if you have to decrease to bid dosing.

## 2021-11-13 RX ORDER — METHOCARBAMOL 750 MG/1
750 TABLET, FILM COATED ORAL 2 TIMES DAILY PRN
Qty: 60 TABLET | Refills: 2 | Status: SHIPPED | OUTPATIENT
Start: 2021-11-13 | End: 2022-02-17

## 2021-11-13 NOTE — TELEPHONE ENCOUNTER
I did go ahead and authorize a refill on methocarbamol.  I would rather use it initially.  Cyclobenzaprine can cause significant sleepiness and other side effects.  We can see how she does with this though.  Thanks.

## 2021-11-16 ENCOUNTER — HOSPITAL ENCOUNTER (OUTPATIENT)
Dept: MAMMOGRAPHY | Facility: HOSPITAL | Age: 56
Discharge: HOME | End: 2021-11-16
Admitting: NURSE PRACTITIONER

## 2021-11-16 DIAGNOSIS — Z12.31 SCREENING MAMMOGRAM, ENCOUNTER FOR: ICD-10-CM

## 2021-11-16 PROCEDURE — 77063 BREAST TOMOSYNTHESIS BI: CPT

## 2021-11-16 PROCEDURE — 77067 SCR MAMMO BI INCL CAD: CPT

## 2021-11-17 DIAGNOSIS — G47.33 OSA (OBSTRUCTIVE SLEEP APNEA): Primary | ICD-10-CM

## 2021-11-22 ENCOUNTER — TELEPHONE (OUTPATIENT)
Dept: SLEEP MEDICINE | Facility: HOSPITAL | Age: 56
End: 2021-11-22

## 2021-11-22 NOTE — TELEPHONE ENCOUNTER
LV informing patient of sleep study results, sending orders to [facility] unless patient requests otherwise. Follow up needed

## 2021-11-24 ENCOUNTER — APPOINTMENT (OUTPATIENT)
Dept: MAMMOGRAPHY | Facility: HOSPITAL | Age: 56
End: 2021-11-24

## 2021-12-06 ENCOUNTER — TELEPHONE (OUTPATIENT)
Dept: FAMILY MEDICINE CLINIC | Age: 56
End: 2021-12-06

## 2021-12-06 ENCOUNTER — LAB (OUTPATIENT)
Dept: LAB | Facility: HOSPITAL | Age: 56
End: 2021-12-06

## 2021-12-06 DIAGNOSIS — Z11.59 SCREENING FOR VIRAL DISEASE: ICD-10-CM

## 2021-12-06 LAB — HCV AB SER DONR QL: NORMAL

## 2021-12-06 PROCEDURE — 86803 HEPATITIS C AB TEST: CPT

## 2021-12-06 PROCEDURE — 36415 COLL VENOUS BLD VENIPUNCTURE: CPT

## 2021-12-06 NOTE — TELEPHONE ENCOUNTER
Janessa ordered blood work for patient above and she wanted to let ya'll know that she had the Hep C vaccine so if it is positive

## 2021-12-09 RX ORDER — LIDOCAINE 50 MG/G
1 PATCH TOPICAL
Qty: 30 PATCH | Refills: 11 | Status: SHIPPED | OUTPATIENT
Start: 2021-12-09

## 2021-12-09 RX ORDER — DILTIAZEM HYDROCHLORIDE 60 MG/1
2 TABLET, FILM COATED ORAL 2 TIMES DAILY
Qty: 10.2 G | Refills: 11 | Status: SHIPPED | OUTPATIENT
Start: 2021-12-09 | End: 2022-04-15

## 2021-12-09 RX ORDER — FUROSEMIDE 40 MG/1
40 TABLET ORAL DAILY
Qty: 90 TABLET | Refills: 3 | Status: SHIPPED | OUTPATIENT
Start: 2021-12-09 | End: 2022-02-17 | Stop reason: SDUPTHER

## 2021-12-09 NOTE — TELEPHONE ENCOUNTER
Caller: Bc Travisselena Giraldo    Relationship: Self    Best call back number: [phone]    Requested Prescriptions:   Requested Prescriptions     Pending Prescriptions Disp Refills   • Symbicort 80-4.5 MCG/ACT inhaler [Pharmacy Med Name: SYMBICORT 80/4.5MCG (120  ORAL INH)] 10.2 g 0     Sig: INHALE 2 PUFFS BY MOUTH TWICE DAILY   • furosemide (LASIX) 40 MG tablet [Pharmacy Med Name: FUROSEMIDE 40MG TABLETS] 90 tablet 0     Sig: TAKE 1 TABLET BY MOUTH DAILY   • metFORMIN (GLUCOPHAGE) 500 MG tablet [Pharmacy Med Name: METFORMIN 500MG TABLETS] 360 tablet 0     Sig: TAKE 1 TABLET BY MOUTH TWICE DAILY FOR DIABETES. INCREASE TO 2 TABLETS TWICE DAILY AFTER 2 WEEKS   • lidocaine (LIDODERM) 5 % 30 patch 2     Sig: Place 1 patch on the skin as directed by provider Daily for 30 days. Remove & Discard patch within 12 hours or as directed by MD        Pharmacy where request should be sent: [id number] - [place], [place] - [address] AT [facility] [address] - [phone] [facility] [phone] FX     Additional details provided by patient: THE PATIENT STATED HER lidocaine (LIDODERM) 5 % NEED TO BE NAME BRAND. HER INSURANCE WILL ONLY COVER NAME BRAND. SHE WOULD LIKE YADIEL TO SEND THESE TO PCP AND SENT TO THE PHARMACY TODAY. SHE WOULD LIKE A CALL BACK FROM YADIEL AS WELL    Does the patient have less than a 3 day supply:  [x] Yes  [] No    Molly Carson Rep   12/09/21 11:53 EST

## 2021-12-16 ENCOUNTER — CLINICAL SUPPORT (OUTPATIENT)
Dept: FAMILY MEDICINE CLINIC | Age: 56
End: 2021-12-16

## 2021-12-16 PROCEDURE — 91300 COVID-19 (PFIZER): CPT | Performed by: FAMILY MEDICINE

## 2021-12-16 PROCEDURE — 0002A COVID-19 (PFIZER): CPT | Performed by: FAMILY MEDICINE

## 2022-01-05 ENCOUNTER — TELEPHONE (OUTPATIENT)
Dept: FAMILY MEDICINE CLINIC | Age: 57
End: 2022-01-05

## 2022-01-05 RX ORDER — VALACYCLOVIR HYDROCHLORIDE 1 G/1
2000 TABLET, FILM COATED ORAL 2 TIMES DAILY
Qty: 4 TABLET | Refills: 2 | Status: SHIPPED | OUTPATIENT
Start: 2022-01-05 | End: 2022-02-25 | Stop reason: SDUPTHER

## 2022-01-05 NOTE — TELEPHONE ENCOUNTER
I have sent a prescription for generic Valtrex for her to [facility]. It is a 1 day treatment course recommended. Thanks.

## 2022-01-05 NOTE — TELEPHONE ENCOUNTER
Caller: Travis Yancey    Relationship: Self    Best call back number: [phone]    What medication are you requesting: VALACYCLOVIR     What are your current symptoms: COLD SORES BREAKING OUT ON PATIENT'S FACE AND MOUTH    How long have you been experiencing symptoms: 2-3 DAYS     Have you had these symptoms before:    [x] Yes  [] No    Have you been treated for these symptoms before:   [x] Yes  [] No    If a prescription is needed, what is your preferred pharmacy and phone number: [id number] - [place], [place] - [address] AT [facility] [address]  - [phone]  - [phone] FX     Additional notes:

## 2022-01-15 DIAGNOSIS — R11.0 NAUSEA: ICD-10-CM

## 2022-01-18 RX ORDER — ONDANSETRON 4 MG/1
TABLET, ORALLY DISINTEGRATING ORAL
Qty: 15 TABLET | Refills: 1 | Status: SHIPPED | OUTPATIENT
Start: 2022-01-18 | End: 2022-03-17

## 2022-02-17 ENCOUNTER — OFFICE VISIT (OUTPATIENT)
Dept: FAMILY MEDICINE CLINIC | Age: 57
End: 2022-02-17

## 2022-02-17 ENCOUNTER — LAB (OUTPATIENT)
Dept: LAB | Facility: HOSPITAL | Age: 57
End: 2022-02-17

## 2022-02-17 VITALS
BODY MASS INDEX: 41.42 KG/M2 | HEIGHT: 65 IN | WEIGHT: 248.6 LBS | DIASTOLIC BLOOD PRESSURE: 75 MMHG | HEART RATE: 86 BPM | TEMPERATURE: 98.8 F | SYSTOLIC BLOOD PRESSURE: 119 MMHG

## 2022-02-17 DIAGNOSIS — I10 ESSENTIAL HYPERTENSION: ICD-10-CM

## 2022-02-17 DIAGNOSIS — E66.01 MORBID OBESITY WITH BMI OF 40.0-44.9, ADULT: ICD-10-CM

## 2022-02-17 DIAGNOSIS — E78.2 MIXED HYPERLIPIDEMIA: ICD-10-CM

## 2022-02-17 DIAGNOSIS — E11.42 TYPE 2 DIABETES MELLITUS WITH DIABETIC POLYNEUROPATHY, WITHOUT LONG-TERM CURRENT USE OF INSULIN: ICD-10-CM

## 2022-02-17 DIAGNOSIS — Z79.899 OTHER LONG TERM (CURRENT) DRUG THERAPY: ICD-10-CM

## 2022-02-17 DIAGNOSIS — I50.32 CHRONIC DIASTOLIC (CONGESTIVE) HEART FAILURE: ICD-10-CM

## 2022-02-17 DIAGNOSIS — E11.42 TYPE 2 DIABETES MELLITUS WITH DIABETIC POLYNEUROPATHY, WITHOUT LONG-TERM CURRENT USE OF INSULIN: Primary | ICD-10-CM

## 2022-02-17 LAB
ALBUMIN SERPL-MCNC: 5 G/DL (ref 3.5–5.2)
ALBUMIN UR-MCNC: <1.2 MG/DL
ALBUMIN/GLOB SERPL: 1.7 G/DL
ALP SERPL-CCNC: 65 U/L (ref 39–117)
ALT SERPL W P-5'-P-CCNC: 62 U/L (ref 1–33)
ANION GAP SERPL CALCULATED.3IONS-SCNC: 9.3 MMOL/L (ref 5–15)
AST SERPL-CCNC: 56 U/L (ref 1–32)
BACTERIA UR QL AUTO: ABNORMAL /HPF
BILIRUB SERPL-MCNC: 0.2 MG/DL (ref 0–1.2)
BILIRUB UR QL STRIP: NEGATIVE
BUN SERPL-MCNC: 25 MG/DL (ref 6–20)
BUN/CREAT SERPL: 31.6 (ref 7–25)
CALCIUM SPEC-SCNC: 9.6 MG/DL (ref 8.6–10.5)
CHLORIDE SERPL-SCNC: 102 MMOL/L (ref 98–107)
CLARITY UR: CLEAR
CO2 SERPL-SCNC: 26.7 MMOL/L (ref 22–29)
COLOR UR: YELLOW
CREAT SERPL-MCNC: 0.79 MG/DL (ref 0.57–1)
DEPRECATED RDW RBC AUTO: 42.8 FL (ref 37–54)
ERYTHROCYTE [DISTWIDTH] IN BLOOD BY AUTOMATED COUNT: 14.1 % (ref 12.3–15.4)
GFR SERPL CREATININE-BSD FRML MDRD: 76 ML/MIN/1.73
GLOBULIN UR ELPH-MCNC: 2.9 GM/DL
GLUCOSE SERPL-MCNC: 110 MG/DL (ref 65–99)
GLUCOSE UR STRIP-MCNC: NEGATIVE MG/DL
HCT VFR BLD AUTO: 39.8 % (ref 34–46.6)
HGB BLD-MCNC: 12.4 G/DL (ref 12–15.9)
HGB UR QL STRIP.AUTO: NEGATIVE
KETONES UR QL STRIP: NEGATIVE
LEUKOCYTE ESTERASE UR QL STRIP.AUTO: ABNORMAL
MCH RBC QN AUTO: 25.8 PG (ref 26.6–33)
MCHC RBC AUTO-ENTMCNC: 31.2 G/DL (ref 31.5–35.7)
MCV RBC AUTO: 82.7 FL (ref 79–97)
NITRITE UR QL STRIP: NEGATIVE
PH UR STRIP.AUTO: 5.5 [PH] (ref 5–8)
PLATELET # BLD AUTO: 259 10*3/MM3 (ref 140–450)
PMV BLD AUTO: 11.7 FL (ref 6–12)
POTASSIUM SERPL-SCNC: 4.4 MMOL/L (ref 3.5–5.2)
PROT SERPL-MCNC: 7.9 G/DL (ref 6–8.5)
PROT UR QL STRIP: NEGATIVE
RBC # BLD AUTO: 4.81 10*6/MM3 (ref 3.77–5.28)
RBC # UR STRIP: ABNORMAL /HPF
REF LAB TEST METHOD: ABNORMAL
SODIUM SERPL-SCNC: 138 MMOL/L (ref 136–145)
SP GR UR STRIP: >=1.03 (ref 1–1.03)
SQUAMOUS #/AREA URNS HPF: ABNORMAL /HPF
TSH SERPL DL<=0.05 MIU/L-ACNC: 1.42 UIU/ML (ref 0.27–4.2)
UROBILINOGEN UR QL STRIP: ABNORMAL
WBC # UR STRIP: ABNORMAL /HPF
WBC NRBC COR # BLD: 9.2 10*3/MM3 (ref 3.4–10.8)

## 2022-02-17 PROCEDURE — 80053 COMPREHEN METABOLIC PANEL: CPT

## 2022-02-17 PROCEDURE — 84443 ASSAY THYROID STIM HORMONE: CPT

## 2022-02-17 PROCEDURE — 83036 HEMOGLOBIN GLYCOSYLATED A1C: CPT

## 2022-02-17 PROCEDURE — 99214 OFFICE O/P EST MOD 30 MIN: CPT | Performed by: FAMILY MEDICINE

## 2022-02-17 PROCEDURE — 85027 COMPLETE CBC AUTOMATED: CPT

## 2022-02-17 PROCEDURE — 36415 COLL VENOUS BLD VENIPUNCTURE: CPT

## 2022-02-17 PROCEDURE — 82043 UR ALBUMIN QUANTITATIVE: CPT

## 2022-02-17 PROCEDURE — 81001 URINALYSIS AUTO W/SCOPE: CPT

## 2022-02-17 RX ORDER — LEVOCETIRIZINE DIHYDROCHLORIDE 5 MG/1
2.5 TABLET, FILM COATED ORAL DAILY
Qty: 90 TABLET | Refills: 1 | Status: SHIPPED | OUTPATIENT
Start: 2022-02-17

## 2022-02-17 RX ORDER — FUROSEMIDE 40 MG/1
40 TABLET ORAL DAILY
Qty: 90 TABLET | Refills: 3 | Status: SHIPPED | OUTPATIENT
Start: 2022-02-17

## 2022-02-17 RX ORDER — POTASSIUM CHLORIDE 750 MG/1
10 CAPSULE, EXTENDED RELEASE ORAL DAILY
Qty: 90 CAPSULE | Refills: 1 | Status: SHIPPED | OUTPATIENT
Start: 2022-02-17 | End: 2022-08-15 | Stop reason: SDUPTHER

## 2022-02-17 RX ORDER — METHOCARBAMOL 750 MG/1
TABLET, FILM COATED ORAL
Qty: 60 TABLET | Refills: 2 | Status: SHIPPED | OUTPATIENT
Start: 2022-02-17 | End: 2022-05-11

## 2022-02-17 RX ORDER — METFORMIN HYDROCHLORIDE 500 MG/1
1000 TABLET, EXTENDED RELEASE ORAL 2 TIMES DAILY WITH MEALS
Qty: 360 TABLET | Refills: 1 | Status: SHIPPED | OUTPATIENT
Start: 2022-02-17 | End: 2022-08-15 | Stop reason: SDUPTHER

## 2022-02-17 RX ORDER — PANTOPRAZOLE SODIUM 40 MG/1
40 TABLET, DELAYED RELEASE ORAL DAILY
Qty: 90 TABLET | Refills: 1 | Status: SHIPPED | OUTPATIENT
Start: 2022-02-17 | End: 2022-08-19 | Stop reason: SDUPTHER

## 2022-02-17 RX ORDER — FENOFIBRATE 134 MG/1
134 CAPSULE ORAL DAILY
Qty: 90 CAPSULE | Refills: 1 | Status: SHIPPED | OUTPATIENT
Start: 2022-02-17 | End: 2022-08-15 | Stop reason: SDUPTHER

## 2022-02-17 NOTE — PROGRESS NOTES
Travis Yancey presents to [facility].    Chief Complaint:  Follow up on diabetes, blood pressure, obesity    Subjective       History of Present Illness:  Travis is in today for follow-up on her care.  She has a history of type 2 diabetes for which she currently takes metformin 1000 mg twice daily.  Her most recent A1c was good.  Her sugar varies between 110 and 150.    She also has a history of hypertension and dyslipidemia for which she remains on treatment in the form of metoprolol and furosemide.  She is concerned about taking furosemide though.  She has concerns about her kidney function over the long-term.    Travis is considering moving ahead with bariatric surgery.  She has struggled with obesity for a long period of time, and she has not been able to lose and sustain weight loss with diet and exercise means.  She also has multiple comorbid issues as above.  In addition, she has a history of significant arthritis.  She describes it involving and impacting multiple different joints.  She has had previous right hip replacement.      Review of Systems:  Review of Systems   Constitutional: Negative for chills and fever.   Respiratory: Negative for cough and shortness of breath.    Cardiovascular: Negative for chest pain and palpitations.   Gastrointestinal: Negative for abdominal pain, nausea and vomiting.        Objective   Medical History:  Past Medical History:   • ADHD (attention deficit hyperactivity disorder)   • Anxiety   • Arthritis   • Depression   • Diabetes mellitus (HCC)   • Fibromyalgia   • Hypertension     Past Surgical History:   • APPENDECTOMY   • CARDIAC CATHETERIZATION    Procedure: RIGHT AND LEFT HEART CATH;  Surgeon: Jennifer Wilcox MD;  Location: [facility] INVASIVE LOCATION;  Service: Cardiology;  Laterality: Left;  right and left cardiac catheterization   • CARDIAC CATHETERIZATION    Procedure: Left ventriculography;  Surgeon: Jennifer Wilcox MD;   Location:  [facility] INV[facility] LOC[facility];  Service: Cardiology;  Laterality: N/A;   • CARDIAC CATHETERIZATION    Procedure: Coronary angiography;  Surgeon: Jennifer Wilcox MD;  Location:  [facility] INV[facility] LOCATION;  Service: Cardiology;  Laterality: N/A;   • CERVICAL FUSION    c5-6   •  SECTION   • CHOLECYSTECTOMY   • DISCECTOMY POSTERIOR THORACIC TRANSPEDICULAR APPROACH   • PARTIAL HIP ARTHROPLASTY      History reviewed. No pertinent family history.  Social History     Tobacco Use   • Smoking status: Former Smoker     Types: Cigarettes     Quit date: 2019     Years since quittin.4   • Smokeless tobacco: Never Used   • Tobacco comment: Intermitt. starting age 22, 3 packs a wk   Substance Use Topics   • Alcohol use: Not Currently       Health Maintenance Due   Topic Date Due   • TDAP/TD VACCINES (1 - Tdap) Never done   • DIABETIC EYE EXAM  Never done   • ZOSTER VACCINE (2 of 2) 2021        Immunization History   Administered Date(s) Administered   • COVID-19 (ALEX) 06/15/2021   • COVID-19 (PFIZER) PURPLE CAP 2021   • Flu Vaccine Split Quad 10/06/2021   • Influenza, Unspecified 2020   • Pneumococcal Polysaccharide (PPSV23) 10/26/2021   • Shingrix 10/06/2021   • flucelvax quad pfs =>4 YRS 2020       No Known Allergies     Medications:  Current Outpatient Medications on File Prior to Visit   Medication Sig   • amphetamine-dextroamphetamine (ADDERALL) 30 MG tablet Take 1 tablet by mouth 2 (two) times a day.   • clonazePAM (KlonoPIN) 1 MG tablet Take 1 tablet by mouth 3 (Three) Times a Day.   • Diclofenac Sodium (VOLTAREN) 1 % gel gel APPLY 2 GRAMS TO THE APPROPRIATE AREA AS DIRECTED THREE TIMES DAILY FOR 30 DAYS   • fluticasone (FLONASE) 50 MCG/ACT nasal spray 2 sprays into the nostril(s) as directed by provider Daily.   • gabapentin (NEURONTIN) 600 MG tablet Take 1 tablet by mouth 3 (Three) Times a Day.   • lidocaine (LIDODERM) 5 % Place 1 patch on the skin as directed by  provider Daily. Remove & Discard patch within 12 hours or as directed by MD   • metoprolol tartrate (LOPRESSOR) 25 MG tablet Take 1 tablet by mouth 2 (Two) Times a Day.   • ondansetron ODT (ZOFRAN-ODT) 4 MG disintegrating tablet DISSOLVE 1 TABLET ON THE TONGUE EVERY 8 HOURS AS NEEDED FOR NAUSEA OR VOMITING   • Symbicort 80-4.5 MCG/ACT inhaler Inhale 2 puffs 2 (Two) Times a Day.   • temazepam (RESTORIL) 30 MG capsule Take 1 capsule by mouth Every Night.   • traZODone (DESYREL) 50 MG tablet Take 1 tablet by mouth At Night As Needed for Sleep.   • valACYclovir (VALTREX) 1000 MG tablet Take 2 tablets by mouth 2 (Two) Times a Day.   • Viibryd 40 MG tablet tablet Take 1 tablet by mouth Daily.   • [DISCONTINUED] fenofibrate micronized (LOFIBRA) 134 MG capsule Take 1 capsule by mouth Daily.   • [DISCONTINUED] furosemide (LASIX) 40 MG tablet Take 1 tablet by mouth Daily.   • [DISCONTINUED] levocetirizine (XYZAL) 5 MG tablet Take 0.5 tablets by mouth Daily.   • [DISCONTINUED] metFORMIN (GLUCOPHAGE) 500 MG tablet Take 2 tablets by mouth 2 (Two) Times a Day With Meals.   • [DISCONTINUED] pantoprazole (PROTONIX) 40 MG EC tablet Take 1 tablet by mouth Daily.   • [DISCONTINUED] potassium chloride (MICRO-K) 10 MEQ CR capsule Take 1 capsule by mouth Daily. (Patient taking differently: Take 10 mEq by mouth Daily. Take with lasix)   • [DISCONTINUED] cetirizine (zyrTEC) 10 MG tablet Take 10 mg by mouth Daily.   • [DISCONTINUED] ibuprofen (ADVIL,MOTRIN) 600 MG tablet    • [DISCONTINUED] levoFLOXacin (Levaquin) 250 MG tablet Take 1 tablet by mouth Daily.   • [DISCONTINUED] methocarbamol (ROBAXIN) 750 MG tablet Take 1 tablet by mouth 2 (Two) Times a Day As Needed for Muscle Spasms.   • [DISCONTINUED] nitrofurantoin, macrocrystal-monohydrate, (Macrobid) 100 MG capsule Take 1 capsule by mouth 2 (Two) Times a Day.     No current facility-administered medications on file prior to visit.       Vital Signs:   /75 (BP Location: Right arm,  "Patient Position: Sitting)   Pulse 86   Temp 98.8 °F (37.1 °C) (Oral)   Ht 165.1 cm (65\")   Wt 113 kg (248 lb 9.6 oz)   BMI 41.37 kg/m²       Physical Exam:  Physical Exam  Vitals reviewed.   Constitutional:       General: She is not in acute distress.     Appearance: She is obese. She is not ill-appearing.   Eyes:      Pupils: Pupils are equal, round, and reactive to light.   Neck:      Comments: No thyromegaly  Cardiovascular:      Rate and Rhythm: Normal rate and regular rhythm.   Pulmonary:      Effort: Pulmonary effort is normal.      Breath sounds: Normal breath sounds.   Abdominal:      General: There is no distension.      Palpations: Abdomen is soft.      Tenderness: There is no abdominal tenderness.   Musculoskeletal:      Cervical back: Neck supple.   Lymphadenopathy:      Cervical: No cervical adenopathy.   Skin:     Findings: No lesion or rash.   Neurological:      Mental Status: She is alert.         Result Review      The following data was reviewed by Jeferson Harmon MD on 02/17/2022.  Lab Results   Component Value Date    WBC 7.47 10/16/2021    HGB 11.0 (L) 10/16/2021    HCT 34.9 10/16/2021    MCV 83.5 10/16/2021     10/16/2021     Lab Results   Component Value Date    GLUCOSE 211 (H) 10/16/2021    BUN 17 10/16/2021    CREATININE 0.84 10/16/2021     10/16/2021    K 4.7 10/16/2021     10/16/2021    CO2 22.3 10/16/2021    CALCIUM 8.5 (L) 10/16/2021    PROTEINTOT 7.6 10/13/2021    ALBUMIN 4.70 10/13/2021    ALT 44 (H) 10/13/2021    AST 47 (H) 10/13/2021    ALKPHOS 61 10/13/2021    BILITOT 0.2 10/13/2021    EGFRIFNONA 71 10/16/2021    GLOB 2.9 10/13/2021    AGRATIO 1.6 10/13/2021    BCR 20.2 10/16/2021    ANIONGAP 12.7 10/16/2021      Lab Results   Component Value Date    CHOL 130 08/25/2021    TRIG 311 (H) 08/25/2021    HDL 37 (L) 08/25/2021    LDL 46 08/25/2021     Lab Results   Component Value Date    TSH 1.600 08/25/2021     Lab Results   Component Value Date    HGBA1C " 6.20 (H) 08/25/2021            Assessment and Plan:   Today, we have again reviewed her care.  We will refill needed medications and update labs as noted below.  For the short-term, I am not recommending a change in furosemide.  We will see what her labs show and move forward from there.       Diagnoses and all orders for this visit:    1. Type 2 diabetes mellitus with diabetic polyneuropathy, without long-term current use of insulin ([facility]) (Primary)  -     Comprehensive Metabolic Panel; Future  -     Hemoglobin A1c; Future  -     metFORMIN ER (GLUCOPHAGE-XR) 500 MG 24 hr tablet; Take 2 tablets by mouth 2 (Two) Times a Day With Meals.  Dispense: 360 tablet; Refill: 1    2. Essential hypertension  -     Comprehensive Metabolic Panel; Future  -     furosemide (LASIX) 40 MG tablet; Take 1 tablet by mouth Daily.  Dispense: 90 tablet; Refill: 3  -     potassium chloride (MICRO-K) 10 MEQ CR capsule; Take 1 capsule by mouth Daily.  Dispense: 90 capsule; Refill: 1    3. Mixed hyperlipidemia  -     TSH; Future  -     fenofibrate micronized (LOFIBRA) 134 MG capsule; Take 1 capsule by mouth Daily.  Dispense: 90 capsule; Refill: 1    4. Chronic diastolic (congestive) heart failure (HCC)  -     Comprehensive Metabolic Panel; Future  -     furosemide (LASIX) 40 MG tablet; Take 1 tablet by mouth Daily.  Dispense: 90 tablet; Refill: 3  -     potassium chloride (MICRO-K) 10 MEQ CR capsule; Take 1 capsule by mouth Daily.  Dispense: 90 capsule; Refill: 1    5. Other long term (current) drug therapy  -     CBC (No Diff); Future    6. Morbid obesity with BMI of 40.0-44.9, adult ([facility])  Comments:  I agree with her desire to move ahead with bariatric surgery.    Other orders  -     levocetirizine (XYZAL) 5 MG tablet; Take 0.5 tablets by mouth Daily.  Dispense: 90 tablet; Refill: 1  -     pantoprazole (PROTONIX) 40 MG EC tablet; Take 1 tablet by mouth Daily.  Dispense: 90 tablet; Refill: 1          Follow Up   Return in about 6 months  (around 8/17/2022).  Patient was given instructions and counseling regarding her condition or for health maintenance advice. Please see specific information pulled into the AVS if appropriate.

## 2022-02-17 NOTE — PATIENT INSTRUCTIONS
Diabetes Mellitus Basics    Diabetes mellitus, or diabetes, is a long-term (chronic) disease. It occurs when the body does not properly use sugar (glucose) that is released from food after you eat.  Diabetes mellitus may be caused by one or both of these problems:  · Your pancreas does not make enough of a hormone called insulin.  · Your body does not react in a normal way to the insulin that it makes.  Insulin lets glucose enter cells in your body. This gives you energy. If you have diabetes, glucose cannot get into cells. This causes high blood glucose (hyperglycemia).  How to treat and manage diabetes  You may need to take insulin or other diabetes medicines daily to keep your glucose in balance. If you are prescribed insulin, you will learn how to give yourself insulin by injection. You may need to adjust the amount of insulin you take based on the foods that you eat.  You will need to check your blood glucose levels using a glucose monitor as told by your health care provider. The readings can help determine if you have low or high blood glucose.  Generally, you should have these blood glucose levels:  · Before meals (preprandial):  mg/dL (4.4-7.2 mmol/L).  · After meals (postprandial): below 180 mg/dL (10 mmol/L).  · Hemoglobin A1c (HbA1c) level: less than 7%.  Your health care provider will set treatment goals for you.  Keep all follow-up visits. This is important.  Follow these instructions at home:  Diabetes medicines  Take your diabetes medicines every day as told by your health care provider. List your diabetes medicines here:  · Name of medicine: ______________________________  ? Amount (dose): _______________ Time (a.m./p.m.): _______________ Notes: ___________________________________  · Name of medicine: ______________________________  ? Amount (dose): _______________ Time (a.m./p.m.): _______________ Notes: ___________________________________  · Name of medicine:  ______________________________  ? Amount (dose): _______________ Time (a.m./p.m.): _______________ Notes: ___________________________________  Insulin  If you use insulin, list the types of insulin you use here:  · Insulin type: ______________________________  ? Amount (dose): _______________ Time (a.m./p.m.): _______________Notes: ___________________________________  · Insulin type: ______________________________  ? Amount (dose): _______________ Time (a.m./p.m.): _______________ Notes: ___________________________________  · Insulin type: ______________________________  ? Amount (dose): _______________ Time (a.m./p.m.): _______________ Notes: ___________________________________  · Insulin type: ______________________________  ? Amount (dose): _______________ Time (a.m./p.m.): _______________ Notes: ___________________________________  · Insulin type: ______________________________  ? Amount (dose): _______________ Time (a.m./p.m.): _______________ Notes: ___________________________________  Managing blood glucose    Check your blood glucose levels using a glucose monitor as told by your health care provider.  Write down the times that you check your glucose levels here:  · Time: _______________ Notes: ___________________________________  · Time: _______________ Notes: ___________________________________  · Time: _______________ Notes: ___________________________________  · Time: _______________ Notes: ___________________________________  · Time: _______________ Notes: ___________________________________  · Time: _______________ Notes: ___________________________________    Low blood glucose  Low blood glucose (hypoglycemia) is when glucose is at or below 70 mg/dL (3.9 mmol/L). Symptoms may include:  · Feeling:  ? Hungry.  ? Sweaty and clammy.  ? Irritable or easily upset.  ? Dizzy.  ? Sleepy.  · Having:  ? A fast heartbeat.  ? A headache.  ? A change in your vision.  ? Numbness around the mouth, lips, or  tongue.  · Having trouble with:  ? Moving (coordination).  ? Sleeping.  Treating low blood glucose  To treat low blood glucose, eat or drink something containing sugar right away. If you can think clearly and swallow safely, follow the 15:15 rule:  · Take 15 grams of a fast-acting carb (carbohydrate), as told by your health care provider.  · Some fast-acting carbs are:  ? Glucose tablets: take 3-4 tablets.  ? Hard candy: eat 3-5 pieces.  ? Fruit juice: drink 4 oz (120 mL).  ? Regular (not diet) soda: drink 4-6 oz (120-180 mL).  ? Honey or sugar: eat 1 Tbsp (15 mL).  · Check your blood glucose levels 15 minutes after you take the carb.  · If your glucose is still at or below 70 mg/dL (3.9 mmol/L), take 15 grams of a carb again.  · If your glucose does not go above 70 mg/dL (3.9 mmol/L) after 3 tries, get help right away.  · After your glucose goes back to normal, eat a meal or a snack within 1 hour.  Treating very low blood glucose  If your glucose is at or below 54 mg/dL (3 mmol/L), you have very low blood glucose (severe hypoglycemia).  This is an emergency. Do not wait to see if the symptoms will go away. Get medical help right away. Call your local emergency services (911 in the [place] Do not drive yourself to the hospital.  Questions to ask your health care provider  · Should I talk with a diabetes educator?  · What equipment will I need to care for myself at home?  · What diabetes medicines do I need? When should I take them?  · How often do I need to check my blood glucose levels?  · What number can I call if I have questions?  · When is my follow-up visit?  · Where can I find a support group for people with diabetes?  Where to find more information  · American Diabetes Association: www.diabetes.org  · Association of Diabetes Care and Education Specialists: www.diabeteseducator.org  Contact a health care provider if:  · Your blood glucose is at or above 240 mg/dL (13.3 mmol/L) for 2 days in a row.  · You have  been sick or have had a fever for 2 days or more, and you are not getting better.  · You have any of these problems for more than 6 hours:  ? You cannot eat or drink.  ? You feel nauseous.  ? You vomit.  ? You have diarrhea.  Get help right away if:  · Your blood glucose is lower than 54 mg/dL (3 mmol/L).  · You get confused.  · You have trouble thinking clearly.  · You have trouble breathing.  These symptoms may represent a serious problem that is an emergency. Do not wait to see if the symptoms will go away. Get medical help right away. Call your local emergency services (911 in the [place] Do not drive yourself to the hospital.  Summary  · Diabetes mellitus is a chronic disease that occurs when the body does not properly use sugar (glucose) that is released from food after you eat.  · Take insulin and diabetes medicines as told.  · Check your blood glucose every day, as often as told.  · Keep all follow-up visits. This is important.  This information is not intended to replace advice given to you by your health care provider. Make sure you discuss any questions you have with your health care provider.  Document Revised: 04/20/2021 Document Reviewed: 04/20/2021  [employer] Patient Education © 2021 [employer]

## 2022-02-18 LAB — HBA1C MFR BLD: 6.7 % (ref 4.8–5.6)

## 2022-02-23 ENCOUNTER — HOSPITAL ENCOUNTER (OUTPATIENT)
Dept: GENERAL RADIOLOGY | Facility: HOSPITAL | Age: 57
Discharge: HOME | End: 2022-02-23

## 2022-02-23 ENCOUNTER — PATIENT ROUNDING (BHMG ONLY) (OUTPATIENT)
Dept: BARIATRICS/WEIGHT MGMT | Facility: CLINIC | Age: 57
End: 2022-02-23

## 2022-02-23 ENCOUNTER — HOSPITAL ENCOUNTER (OUTPATIENT)
Dept: CARDIOLOGY | Facility: HOSPITAL | Age: 57
Discharge: HOME | End: 2022-02-23

## 2022-02-23 ENCOUNTER — CONSULT (OUTPATIENT)
Dept: BARIATRICS/WEIGHT MGMT | Facility: CLINIC | Age: 57
End: 2022-02-23

## 2022-02-23 ENCOUNTER — TELEPHONE (OUTPATIENT)
Dept: CARDIOLOGY | Facility: CLINIC | Age: 57
End: 2022-02-23

## 2022-02-23 ENCOUNTER — TRANSCRIBE ORDERS (OUTPATIENT)
Dept: CARDIOLOGY | Facility: HOSPITAL | Age: 57
End: 2022-02-23

## 2022-02-23 VITALS
WEIGHT: 253 LBS | HEIGHT: 63 IN | SYSTOLIC BLOOD PRESSURE: 137 MMHG | DIASTOLIC BLOOD PRESSURE: 80 MMHG | TEMPERATURE: 97.7 F | RESPIRATION RATE: 18 BRPM | HEART RATE: 87 BPM | BODY MASS INDEX: 44.83 KG/M2

## 2022-02-23 DIAGNOSIS — M54.50 CHRONIC BILATERAL LOW BACK PAIN WITHOUT SCIATICA: ICD-10-CM

## 2022-02-23 DIAGNOSIS — G89.29 CHRONIC BILATERAL LOW BACK PAIN WITHOUT SCIATICA: ICD-10-CM

## 2022-02-23 DIAGNOSIS — Z99.89 OSA ON CPAP: ICD-10-CM

## 2022-02-23 DIAGNOSIS — F41.9 ANXIETY: ICD-10-CM

## 2022-02-23 DIAGNOSIS — E78.2 MIXED HYPERLIPIDEMIA: ICD-10-CM

## 2022-02-23 DIAGNOSIS — F90.9 ATTENTION DEFICIT HYPERACTIVITY DISORDER (ADHD), UNSPECIFIED ADHD TYPE: ICD-10-CM

## 2022-02-23 DIAGNOSIS — Z71.3 DIETARY COUNSELING: ICD-10-CM

## 2022-02-23 DIAGNOSIS — F31.60 BIPOLAR AFFECTIVE DISORDER, CURRENT EPISODE MIXED, CURRENT EPISODE SEVERITY UNSPECIFIED: ICD-10-CM

## 2022-02-23 DIAGNOSIS — F43.10 PTSD (POST-TRAUMATIC STRESS DISORDER): ICD-10-CM

## 2022-02-23 DIAGNOSIS — E11.42 TYPE 2 DIABETES MELLITUS WITH DIABETIC POLYNEUROPATHY, WITHOUT LONG-TERM CURRENT USE OF INSULIN: ICD-10-CM

## 2022-02-23 DIAGNOSIS — Z01.811 PRE-OP CHEST EXAM: Primary | ICD-10-CM

## 2022-02-23 DIAGNOSIS — I10 ESSENTIAL HYPERTENSION: ICD-10-CM

## 2022-02-23 DIAGNOSIS — E66.01 OBESITY, CLASS III, BMI 40-49.9 (MORBID OBESITY): Primary | ICD-10-CM

## 2022-02-23 DIAGNOSIS — E66.2 OBESITY HYPOVENTILATION SYNDROME: ICD-10-CM

## 2022-02-23 DIAGNOSIS — I50.32 CHRONIC DIASTOLIC (CONGESTIVE) HEART FAILURE: ICD-10-CM

## 2022-02-23 DIAGNOSIS — G47.33 OSA ON CPAP: ICD-10-CM

## 2022-02-23 DIAGNOSIS — F32.A DEPRESSION, UNSPECIFIED DEPRESSION TYPE: ICD-10-CM

## 2022-02-23 DIAGNOSIS — K21.9 GASTROESOPHAGEAL REFLUX DISEASE, UNSPECIFIED WHETHER ESOPHAGITIS PRESENT: ICD-10-CM

## 2022-02-23 DIAGNOSIS — D64.9 ANEMIA, UNSPECIFIED TYPE: ICD-10-CM

## 2022-02-23 DIAGNOSIS — E66.01 OBESITY, CLASS III, BMI 40-49.9 (MORBID OBESITY): ICD-10-CM

## 2022-02-23 PROBLEM — E66.813 OBESITY, CLASS III, BMI 40-49.9 (MORBID OBESITY): Status: ACTIVE | Noted: 2021-10-26

## 2022-02-23 PROBLEM — E66.813 OBESITY, CLASS III, BMI 40-49.9 (MORBID OBESITY): Status: ACTIVE | Noted: 2022-02-23

## 2022-02-23 PROBLEM — E66.813 OBESITY, CLASS III, BMI 40-49.9 (MORBID OBESITY): Status: RESOLVED | Noted: 2021-10-26 | Resolved: 2022-02-23

## 2022-02-23 PROBLEM — B00.9 HERPES SIMPLEX: Status: ACTIVE | Noted: 2020-12-08

## 2022-02-23 LAB — QT INTERVAL: 356 MS

## 2022-02-23 PROCEDURE — 99215 OFFICE O/P EST HI 40 MIN: CPT | Performed by: NURSE PRACTITIONER

## 2022-02-23 PROCEDURE — 71046 X-RAY EXAM CHEST 2 VIEWS: CPT

## 2022-02-23 PROCEDURE — 93010 ELECTROCARDIOGRAM REPORT: CPT | Performed by: INTERNAL MEDICINE

## 2022-02-23 PROCEDURE — 93005 ELECTROCARDIOGRAM TRACING: CPT

## 2022-02-23 NOTE — PROGRESS NOTES
My name is Desire Nunes, I am the Practice Manager for [facility].       I want to officially welcome you to our practice and ask about your recent visit.       If you could tell me about your recent visit with us. What things went well?       We're always looking for ways to make our patients experiences even better. Do you have recommendations on ways we may improve?       I appreciate you taking the time to answer a few questions today.           Thank you, and have a great day.         A My-Chart Message has been sent to the patient [place] [facility] [place]
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
## 2022-02-23 NOTE — PROGRESS NOTES
"Bariatric Nutrition Counseling Interview    Patient Name: Travis Yancey    YOB: 1965   Age: 56 y.o.  Sex: female  MRN: [id number]     Procedure considering: sleeve    Height: 63.39\"            Current weight: 253 lbs   BMI: 44.27 kg/m²                          Highest weight: 266 lbs                              Lowest weight: unknown     Patient goals: improved health, less pain  Weight history: always been overweight, weight gain as a result of coping with death of son  Additional health issues to consider: dyslipidemia, hypertension, congestive heart failure, diabetes, GERD, depression, anxiety, bipolar disorder, fibromyalgia, back pain    Patient has tried to lose weight in the past including Weight Watchers, Nutrisystem, Atkins, Sugar Busters, Herbalife, low carbohydrate and exericse. Patient has lost up to 30 lbs on diets, but was unable to maintain this long term.     Diet history revealed no diet restrictions or food allergies. Diet recall: B: egg, mills and toast or high protein oatmeal; L: bologna/turkey/ham sandwich with lettuce and rockwell or salad with grilled chicken; D: protein, potatoes, vegetables; S: Greek yogurt, protein smoothie. Drinking milk, water, coffee. Eating out 3-4 times per week.   Patient identifies problem areas to be eating large portions, eating out of boredom, eating in response to stress, emotional eating, snacking and inactivity     Exercise: none     Pre and post op nutrition education completed and program materials provided. Emphasized the importance of taking in at least 70 g protein and 64 oz fluid daily. Discussed personal habits and lifestyle behaviors that may influence weight loss efforts. Patient verbalized understanding and questions were answered.   Short term goals: decrease intake of high calorie beverages, be mindful of portion sizes.   Additional nutrition counseling available and encouraged both pre and post op.  Patient seemed to have a fair " comprehension of diet requirements and commitment to make healthy changes, but her conversation was frequently off topic and she rarely answered questions directly. It was difficult to provide education without interruption. Travis Kristal Bc may be a suitable candidate for bariatric surgery, pending psychological evaluation.       Yadira Brewster RD  02/23/22  15:13 EST

## 2022-02-25 RX ORDER — VALACYCLOVIR HYDROCHLORIDE 1 G/1
2000 TABLET, FILM COATED ORAL 2 TIMES DAILY
Qty: 4 TABLET | Refills: 2 | Status: SHIPPED | OUTPATIENT
Start: 2022-02-25 | End: 2022-06-02

## 2022-02-28 ENCOUNTER — OFFICE VISIT (OUTPATIENT)
Dept: SLEEP MEDICINE | Facility: HOSPITAL | Age: 57
End: 2022-02-28

## 2022-02-28 VITALS
HEART RATE: 97 BPM | HEIGHT: 63 IN | SYSTOLIC BLOOD PRESSURE: 124 MMHG | OXYGEN SATURATION: 97 % | DIASTOLIC BLOOD PRESSURE: 70 MMHG | BODY MASS INDEX: 44.83 KG/M2 | WEIGHT: 253 LBS

## 2022-02-28 DIAGNOSIS — G47.33 OBSTRUCTIVE SLEEP APNEA, ADULT: Primary | ICD-10-CM

## 2022-02-28 PROCEDURE — G0463 HOSPITAL OUTPT CLINIC VISIT: HCPCS
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
## 2022-03-15 ENCOUNTER — OFFICE VISIT (OUTPATIENT)
Dept: FAMILY MEDICINE CLINIC | Age: 57
End: 2022-03-15

## 2022-03-15 VITALS
WEIGHT: 253 LBS | HEART RATE: 89 BPM | TEMPERATURE: 99.1 F | SYSTOLIC BLOOD PRESSURE: 127 MMHG | HEIGHT: 63 IN | BODY MASS INDEX: 44.83 KG/M2 | DIASTOLIC BLOOD PRESSURE: 68 MMHG

## 2022-03-15 DIAGNOSIS — E66.01 MORBID OBESITY: Primary | ICD-10-CM

## 2022-03-15 PROCEDURE — 99213 OFFICE O/P EST LOW 20 MIN: CPT | Performed by: FAMILY MEDICINE

## 2022-03-15 RX ORDER — CEFDINIR 300 MG/1
300 CAPSULE ORAL 2 TIMES DAILY
Qty: 20 CAPSULE | Refills: 0 | Status: SHIPPED | OUTPATIENT
Start: 2022-03-15 | End: 2022-04-15

## 2022-03-15 NOTE — PROGRESS NOTES
Travis Yancey presents to [facility].    Chief Complaint:  Obesity follow up    Subjective       History of Present Illness:  Travis is is in today for follow-up prior to bariatric surgery.  She has struggled with obesity for a long period of time, and she has not been able to lose and sustain weight loss with diet and exercise means.  She also has multiple comorbid issues including type 2 diabetes, hypertension, hyperlipidemia, obstructive sleep apnea, generalized osteoarthritis, fibromyalgia, chronic low back pain with spinal stenosis and degenerative disc disease.  She has had previous right hip replacement.  Over the last month, Travis has been working on diet.  She says that she has been trying to eat a diet that is higher in protein including lean meat.  She has been limiting intake of carbohydrates.  She does admit to eating a blueberry muffin every other day.  I have encouraged her to continue to be cautious in this regard.  Her weight is the same today as it was last month.  With regard to exercise, she is limited in what she is able to do primarily due to pain.  I have encouraged her to be as active as possible though.      Review of Systems:  Review of Systems   Constitutional: Negative for chills and fever.   Respiratory: Negative for cough and shortness of breath.    Cardiovascular: Negative for chest pain and palpitations.   Gastrointestinal: Negative for abdominal pain, nausea and vomiting.        Objective   Medical History:  Past Medical History:   • ADHD (attention deficit hyperactivity disorder)   • Anxiety   • Arthritis   • Depression   • Fibromyalgia   • Hypertension   • Mixed hyperlipidemia   • Type 2 diabetes mellitus (HCC)     Past Surgical History:   • APPENDECTOMY   • CARDIAC CATHETERIZATION    Procedure: RIGHT AND LEFT HEART CATH;  Surgeon: Jennifer Wilcox MD;  Location: [facility] INVASIVE LOCATION;  Service: Cardiology;  Laterality: Left;  right and left  cardiac catheterization   • CARDIAC CATHETERIZATION    Procedure: Left ventriculography;  Surgeon: Jennifer Wilcox MD;  Location:  [facility];  Service: Cardiology;  Laterality: N/A;   • CARDIAC CATHETERIZATION    Procedure: Coronary angiography;  Surgeon: Jennifer Wilcox MD;  Location:  [facility];  Service: Cardiology;  Laterality: N/A;   • CERVICAL FUSION    c5-6   •  SECTION   • CHOLECYSTECTOMY   • DISCECTOMY POSTERIOR THORACIC TRANSPEDICULAR APPROACH   • PARTIAL HIP ARTHROPLASTY      History reviewed. No pertinent family history.  Social History     Tobacco Use   • Smoking status: Former Smoker     Types: Cigarettes     Quit date: 2019     Years since quittin.5   • Smokeless tobacco: Never Used   • Tobacco comment: Intermitt. starting age 22, 3 packs a wk   Substance Use Topics   • Alcohol use: Not Currently       Health Maintenance Due   Topic Date Due   • TDAP/TD VACCINES (1 - Tdap) Never done   • DIABETIC EYE EXAM  Never done   • ZOSTER VACCINE (2 of 2) 2021        Immunization History   Administered Date(s) Administered   • COVID-19 ([place]) 06/15/2021   • COVID-19 (PFIZER) PURPLE CAP 2021   • Flu Vaccine Split Quad 10/06/2021   • Influenza, Unspecified 2020   • Pneumococcal Polysaccharide (PPSV23) 10/26/2021   • Shingrix 10/06/2021   • flucelvax quad pfs =>4 YRS 2020       No Known Allergies     Medications:  Current Outpatient Medications on File Prior to Visit   Medication Sig   • amphetamine-dextroamphetamine (ADDERALL) 30 MG tablet Take 1 tablet by mouth 2 (two) times a day.   • Ca Carbonate-Mag Hydroxide (ROLAIDS PO) Take  by mouth As Needed.   • clonazePAM (KlonoPIN) 1 MG tablet Take 1 tablet by mouth 3 (Three) Times a Day.   • Diclofenac Sodium (VOLTAREN) 1 % gel gel APPLY 2 GRAMS TO THE APPROPRIATE AREA AS DIRECTED THREE TIMES DAILY FOR 30 DAYS   • fenofibrate micronized (LOFIBRA) 134 MG capsule Take 1 capsule by mouth  "Daily.   • fluticasone (FLONASE) 50 MCG/ACT nasal spray 2 sprays into the nostril(s) as directed by provider Daily.   • furosemide (LASIX) 40 MG tablet Take 1 tablet by mouth Daily.   • gabapentin (NEURONTIN) 600 MG tablet Take 1 tablet by mouth 3 (Three) Times a Day.   • levocetirizine (XYZAL) 5 MG tablet Take 0.5 tablets by mouth Daily.   • lidocaine (LIDODERM) 5 % Place 1 patch on the skin as directed by provider Daily. Remove & Discard patch within 12 hours or as directed by MD   • metFORMIN ER (GLUCOPHAGE-XR) 500 MG 24 hr tablet Take 2 tablets by mouth 2 (Two) Times a Day With Meals.   • methocarbamol (ROBAXIN) 750 MG tablet TAKE 1 TABLET BY MOUTH TWICE DAILY AS NEEDED FOR MUSCLE SPASMS   • metoprolol tartrate (LOPRESSOR) 25 MG tablet Take 1 tablet by mouth 2 (Two) Times a Day.   • ondansetron ODT (ZOFRAN-ODT) 4 MG disintegrating tablet DISSOLVE 1 TABLET ON THE TONGUE EVERY 8 HOURS AS NEEDED FOR NAUSEA OR VOMITING   • pantoprazole (PROTONIX) 40 MG EC tablet Take 1 tablet by mouth Daily.   • potassium chloride (MICRO-K) 10 MEQ CR capsule Take 1 capsule by mouth Daily.   • Symbicort 80-4.5 MCG/ACT inhaler Inhale 2 puffs 2 (Two) Times a Day.   • temazepam (RESTORIL) 30 MG capsule Take 1 capsule by mouth Every Night.   • traZODone (DESYREL) 50 MG tablet Take 1 tablet by mouth At Night As Needed for Sleep.   • valACYclovir (VALTREX) 1000 MG tablet Take 2 tablets by mouth 2 (Two) Times a Day.   • Viibryd 40 MG tablet tablet Take 1 tablet by mouth Daily.     No current facility-administered medications on file prior to visit.       Vital Signs:   /68 (BP Location: Right arm, Patient Position: Sitting)   Pulse 89   Temp 99.1 °F (37.3 °C) (Oral)   Ht 161 cm (63.39\")   Wt 115 kg (253 lb)   BMI 44.27 kg/m²       Physical Exam:  Physical Exam  Vitals reviewed.   Constitutional:       General: She is not in acute distress.     Appearance: She is obese. She is not ill-appearing.   Eyes:      Pupils: Pupils are " equal, round, and reactive to light.   Neck:      Comments: No thyromegaly  Cardiovascular:      Rate and Rhythm: Normal rate and regular rhythm.   Pulmonary:      Effort: Pulmonary effort is normal.      Breath sounds: Normal breath sounds.   Abdominal:      General: There is no distension.      Palpations: Abdomen is soft.      Tenderness: There is no abdominal tenderness.   Musculoskeletal:      Cervical back: Neck supple.   Lymphadenopathy:      Cervical: No cervical adenopathy.   Skin:     Findings: No lesion or rash.   Neurological:      Mental Status: She is alert.         Result Review      The following data was reviewed by Jeferson Harmon MD on 03/15/2022.  Lab Results   Component Value Date    WBC 9.20 02/17/2022    HGB 12.4 02/17/2022    HCT 39.8 02/17/2022    MCV 82.7 02/17/2022     02/17/2022     Lab Results   Component Value Date    GLUCOSE 110 (H) 02/17/2022    BUN 25 (H) 02/17/2022    CREATININE 0.79 02/17/2022     02/17/2022    K 4.4 02/17/2022     02/17/2022    CO2 26.7 02/17/2022    CALCIUM 9.6 02/17/2022    PROTEINTOT 7.9 02/17/2022    ALBUMIN 5.00 02/17/2022    ALT 62 (H) 02/17/2022    AST 56 (H) 02/17/2022    ALKPHOS 65 02/17/2022    BILITOT 0.2 02/17/2022    EGFRIFNONA 76 02/17/2022    GLOB 2.9 02/17/2022    AGRATIO 1.7 02/17/2022    BCR 31.6 (H) 02/17/2022    ANIONGAP 9.3 02/17/2022      Lab Results   Component Value Date    CHOL 130 08/25/2021    TRIG 311 (H) 08/25/2021    HDL 37 (L) 08/25/2021    LDL 46 08/25/2021     Lab Results   Component Value Date    TSH 1.420 02/17/2022     Lab Results   Component Value Date    HGBA1C 6.70 (H) 02/17/2022            Assessment and Plan:   Today, we have again reviewed her care.  Please see the attached paperwork.  I have encouraged her to continue to work diligently on diet over the next month.  She will try to be as active as possible given her limitations.  We will plan to see her back in about 30 days.       Diagnoses and  all orders for this visit:    1. Morbid obesity (HCC) (Primary)          Follow Up   Return in about 1 month (around 4/15/2022) for Recheck.  Patient was given instructions and counseling regarding her condition or for health maintenance advice. Please see specific information pulled into the AVS if appropriate.

## 2022-03-17 DIAGNOSIS — R11.0 NAUSEA: ICD-10-CM

## 2022-03-17 RX ORDER — ONDANSETRON 4 MG/1
TABLET, ORALLY DISINTEGRATING ORAL
Qty: 15 TABLET | Refills: 1 | Status: SHIPPED | OUTPATIENT
Start: 2022-03-17 | End: 2022-05-20 | Stop reason: DRUGHIGH

## 2022-04-15 ENCOUNTER — OFFICE VISIT (OUTPATIENT)
Dept: FAMILY MEDICINE CLINIC | Age: 57
End: 2022-04-15

## 2022-04-15 ENCOUNTER — HOSPITAL ENCOUNTER (OUTPATIENT)
Dept: GENERAL RADIOLOGY | Facility: HOSPITAL | Age: 57
Discharge: HOME | End: 2022-04-15
Admitting: FAMILY MEDICINE

## 2022-04-15 VITALS
WEIGHT: 243.2 LBS | HEIGHT: 63 IN | TEMPERATURE: 98.8 F | SYSTOLIC BLOOD PRESSURE: 114 MMHG | HEART RATE: 78 BPM | DIASTOLIC BLOOD PRESSURE: 63 MMHG | BODY MASS INDEX: 43.09 KG/M2

## 2022-04-15 DIAGNOSIS — J01.01 ACUTE RECURRENT MAXILLARY SINUSITIS: ICD-10-CM

## 2022-04-15 DIAGNOSIS — E66.01 MORBID OBESITY: Primary | ICD-10-CM

## 2022-04-15 PROCEDURE — 99213 OFFICE O/P EST LOW 20 MIN: CPT | Performed by: FAMILY MEDICINE

## 2022-04-15 PROCEDURE — 70220 X-RAY EXAM OF SINUSES: CPT

## 2022-04-15 NOTE — PROGRESS NOTES
Travis Yancey presents to [facility].    Chief Complaint:  Obesity follow up    Subjective       History of Present Illness:  Travis is in today for follow-up on her care.  She has a medically significant obesity and is on track toward having weight loss surgery in the relative near term.  This is her third monthly visit for weight loss guidance and counseling.  She has been careful with her diet in particular over the last month.  She has been limiting carbohydrates, especially potatoes.  She is trying to eat lean meats and vegetables.  She does have the occasional sweet but is very limited.  She has been very cautious.  She has not been able to exercise as much as she would like due to the weather and pain issues.  We have again encouraged her in this regard.    She is also concerned that her sinus infection has not gone.  She has been treated for presumed sinusitis in the last month.  She was prescribed cefdinir at that time, but she is continuing to have sinus drainage and some blood.       Review of Systems:  Review of Systems   Constitutional: Negative for chills and fever.   Respiratory: Negative for cough and shortness of breath.    Cardiovascular: Negative for chest pain and palpitations.   Gastrointestinal: Negative for abdominal pain, nausea and vomiting.        Objective   Medical History:  Past Medical History:   • ADHD (attention deficit hyperactivity disorder)   • Anxiety   • Arthritis   • Depression   • Fibromyalgia   • Hypertension   • Mixed hyperlipidemia   • Type 2 diabetes mellitus (HCC)     Past Surgical History:   • APPENDECTOMY   • CARDIAC CATHETERIZATION    Procedure: RIGHT AND LEFT HEART CATH;  Surgeon: Jennifer Wilcox MD;  Location: [facility] INVASIVE LOCATION;  Service: Cardiology;  Laterality: Left;  right and left cardiac catheterization   • CARDIAC CATHETERIZATION    Procedure: Left ventriculography;  Surgeon: Jennifer Wilcox MD;  Location: [facility]  CATH INVASIVE LOCATION;  Service: Cardiology;  Laterality: N/A;   • CARDIAC CATHETERIZATION    Procedure: Coronary angiography;  Surgeon: Jennifer Wilcox MD;  Location: [facility] CATH INVASIVE LOCATION;  Service: Cardiology;  Laterality: N/A;   • CERVICAL FUSION    c5-6   •  SECTION   • CHOLECYSTECTOMY   • DISCECTOMY POSTERIOR THORACIC TRANSPEDICULAR APPROACH   • PARTIAL HIP ARTHROPLASTY      History reviewed. No pertinent family history.  Social History     Tobacco Use   • Smoking status: Former Smoker     Types: Cigarettes     Quit date: 2019     Years since quittin.6   • Smokeless tobacco: Never Used   • Tobacco comment: Intermitt. starting age 22, 3 packs a wk   Substance Use Topics   • Alcohol use: Not Currently       Health Maintenance Due   Topic Date Due   • TDAP/TD VACCINES (1 - Tdap) Never done   • DIABETIC EYE EXAM  Never done   • ZOSTER VACCINE (2 of 2) 2021        Immunization History   Administered Date(s) Administered   • COVID-19 (TeraView) 06/15/2021   • COVID-19 (PFIZER) PURPLE CAP 2021   • Flu Vaccine Split Quad 10/06/2021   • Influenza, Unspecified 2020   • Pneumococcal Polysaccharide (PPSV23) 10/26/2021   • Shingrix 10/06/2021   • flucelvax quad pfs =>4 YRS 2020       No Known Allergies     Medications:  Current Outpatient Medications on File Prior to Visit   Medication Sig   • amphetamine-dextroamphetamine (ADDERALL) 30 MG tablet Take 1 tablet by mouth 2 (two) times a day.   • Ca Carbonate-Mag Hydroxide (ROLAIDS PO) Take  by mouth As Needed.   • cefdinir (OMNICEF) 300 MG capsule Take 1 capsule by mouth 2 (Two) Times a Day.   • clonazePAM (KlonoPIN) 1 MG tablet Take 1 tablet by mouth 3 (Three) Times a Day.   • Diclofenac Sodium (VOLTAREN) 1 % gel gel Apply  topically to the appropriate area as directed 3 (Three) Times a Day.   • fenofibrate micronized (LOFIBRA) 134 MG capsule Take 1 capsule by mouth Daily.   • fluticasone (FLONASE) 50 MCG/ACT nasal spray 2  "sprays into the nostril(s) as directed by provider Daily.   • furosemide (LASIX) 40 MG tablet Take 1 tablet by mouth Daily.   • gabapentin (NEURONTIN) 600 MG tablet Take 1 tablet by mouth 3 (Three) Times a Day.   • levocetirizine (XYZAL) 5 MG tablet Take 0.5 tablets by mouth Daily.   • lidocaine (LIDODERM) 5 % Place 1 patch on the skin as directed by provider Daily. Remove & Discard patch within 12 hours or as directed by MD   • metFORMIN ER (GLUCOPHAGE-XR) 500 MG 24 hr tablet Take 2 tablets by mouth 2 (Two) Times a Day With Meals.   • methocarbamol (ROBAXIN) 750 MG tablet TAKE 1 TABLET BY MOUTH TWICE DAILY AS NEEDED FOR MUSCLE SPASMS   • metoprolol tartrate (LOPRESSOR) 25 MG tablet Take 1 tablet by mouth 2 (Two) Times a Day.   • ondansetron ODT (ZOFRAN-ODT) 4 MG disintegrating tablet DISSOLVE 1 TABLET ON THE TONGUE EVERY 8 HOURS AS NEEDED FOR NAUSEA OR VOMITING   • pantoprazole (PROTONIX) 40 MG EC tablet Take 1 tablet by mouth Daily.   • potassium chloride (MICRO-K) 10 MEQ CR capsule Take 1 capsule by mouth Daily.   • Symbicort 80-4.5 MCG/ACT inhaler Inhale 2 puffs 2 (Two) Times a Day.   • temazepam (RESTORIL) 30 MG capsule Take 1 capsule by mouth Every Night.   • traZODone (DESYREL) 50 MG tablet Take 1 tablet by mouth At Night As Needed for Sleep.   • valACYclovir (VALTREX) 1000 MG tablet Take 2 tablets by mouth 2 (Two) Times a Day.   • Viibryd 40 MG tablet tablet Take 1 tablet by mouth Daily.     No current facility-administered medications on file prior to visit.       Vital Signs:   /63 (BP Location: Right arm, Patient Position: Sitting)   Pulse 78   Temp 98.8 °F (37.1 °C) (Oral)   Ht 161 cm (63.39\")   Wt 110 kg (243 lb 3.2 oz)   BMI 42.56 kg/m²       Physical Exam:  Physical Exam  Vitals reviewed.   Constitutional:       General: She is not in acute distress.     Appearance: She is obese. She is not ill-appearing.   Eyes:      Pupils: Pupils are equal, round, and reactive to light.   Neck:      " Comments: No thyromegaly  Cardiovascular:      Rate and Rhythm: Normal rate and regular rhythm.   Pulmonary:      Effort: Pulmonary effort is normal.      Breath sounds: Normal breath sounds.   Abdominal:      General: There is no distension.      Palpations: Abdomen is soft.      Tenderness: There is no abdominal tenderness.   Musculoskeletal:      Cervical back: Neck supple.   Lymphadenopathy:      Cervical: No cervical adenopathy.   Skin:     Findings: No lesion or rash.   Neurological:      Mental Status: She is alert.         Result Review      The following data was reviewed by Jeferson Harmon MD on 04/15/2022.  Lab Results   Component Value Date    WBC 9.20 02/17/2022    HGB 12.4 02/17/2022    HCT 39.8 02/17/2022    MCV 82.7 02/17/2022     02/17/2022     Lab Results   Component Value Date    GLUCOSE 110 (H) 02/17/2022    BUN 25 (H) 02/17/2022    CREATININE 0.79 02/17/2022     02/17/2022    K 4.4 02/17/2022     02/17/2022    CO2 26.7 02/17/2022    CALCIUM 9.6 02/17/2022    PROTEINTOT 7.9 02/17/2022    ALBUMIN 5.00 02/17/2022    ALT 62 (H) 02/17/2022    AST 56 (H) 02/17/2022    ALKPHOS 65 02/17/2022    BILITOT 0.2 02/17/2022    EGFRIFNONA 76 02/17/2022    GLOB 2.9 02/17/2022    AGRATIO 1.7 02/17/2022    BCR 31.6 (H) 02/17/2022    ANIONGAP 9.3 02/17/2022      Lab Results   Component Value Date    CHOL 130 08/25/2021    TRIG 311 (H) 08/25/2021    HDL 37 (L) 08/25/2021    LDL 46 08/25/2021     Lab Results   Component Value Date    TSH 1.420 02/17/2022     Lab Results   Component Value Date    HGBA1C 6.70 (H) 02/17/2022            Assessment and Plan:   Today, we have again reviewed Travis's care.  I am very pleased with her weight loss and her efforts with diet in particular.  I have encouraged her to continue this and to be diligent especially if she moves toward surgery.  I do think weight loss surgery is a reasonable alternative for her given the difficulty she has had previously losing  weight as well as her comorbid conditions.  We will send a copy of this visit note along with the form from bariatrics to Dr. Belle's office.  We will plan to see her back in August for recheck on her usual care, sooner if needed.  With regard to the sinuses, we will move ahead with a sinus x-ray series today.  She has had ongoing sinus drainage and congestion.  I would like to feel confident as to whether there is an actual sinus infection present or not.       Diagnoses and all orders for this visit:    1. Morbid obesity (HCC) (Primary)    2. Acute recurrent maxillary sinusitis  -     XR Sinuses 3+ View; Future          Follow Up   Return in about 4 months (around 8/18/2022) for as scheduled.  Patient was given instructions and counseling regarding her condition or for health maintenance advice. Please see specific information pulled into the AVS if appropriate.

## 2022-04-16 RX ORDER — MONTELUKAST SODIUM 10 MG/1
10 TABLET ORAL NIGHTLY
Qty: 30 TABLET | Refills: 1 | Status: SHIPPED | OUTPATIENT
Start: 2022-04-16 | End: 2022-04-18

## 2022-04-18 RX ORDER — MONTELUKAST SODIUM 10 MG/1
10 TABLET ORAL NIGHTLY
Qty: 90 TABLET | Refills: 0 | Status: SHIPPED | OUTPATIENT
Start: 2022-04-18 | End: 2022-05-27

## 2022-04-28 ENCOUNTER — TRANSCRIBE ORDERS (OUTPATIENT)
Dept: ADMINISTRATIVE | Facility: HOSPITAL | Age: 57
End: 2022-04-28

## 2022-04-28 DIAGNOSIS — Z01.818 OTHER SPECIFIED PRE-OPERATIVE EXAMINATION: Primary | ICD-10-CM

## 2022-05-11 RX ORDER — METHOCARBAMOL 750 MG/1
TABLET, FILM COATED ORAL
Qty: 60 TABLET | Refills: 2 | Status: SHIPPED | OUTPATIENT
Start: 2022-05-11 | End: 2022-09-14 | Stop reason: SDUPTHER

## 2022-05-20 ENCOUNTER — OFFICE VISIT (OUTPATIENT)
Dept: FAMILY MEDICINE CLINIC | Age: 57
End: 2022-05-20

## 2022-05-20 VITALS
HEIGHT: 63 IN | TEMPERATURE: 98.9 F | HEART RATE: 86 BPM | DIASTOLIC BLOOD PRESSURE: 61 MMHG | SYSTOLIC BLOOD PRESSURE: 103 MMHG | BODY MASS INDEX: 41.98 KG/M2 | OXYGEN SATURATION: 97 % | WEIGHT: 236.9 LBS

## 2022-05-20 DIAGNOSIS — T36.95XA ANTIBIOTIC-INDUCED YEAST INFECTION: ICD-10-CM

## 2022-05-20 DIAGNOSIS — B37.9 ANTIBIOTIC-INDUCED YEAST INFECTION: ICD-10-CM

## 2022-05-20 DIAGNOSIS — J01.40 ACUTE NON-RECURRENT PANSINUSITIS: ICD-10-CM

## 2022-05-20 DIAGNOSIS — R11.0 NAUSEA: ICD-10-CM

## 2022-05-20 DIAGNOSIS — R09.81 NASAL CONGESTION: Primary | ICD-10-CM

## 2022-05-20 LAB
EXPIRATION DATE: NORMAL
FLUAV AG UPPER RESP QL IA.RAPID: NOT DETECTED
FLUBV AG UPPER RESP QL IA.RAPID: NOT DETECTED
INTERNAL CONTROL: NORMAL
Lab: NORMAL
SARS-COV-2 AG UPPER RESP QL IA.RAPID: NOT DETECTED

## 2022-05-20 PROCEDURE — 99213 OFFICE O/P EST LOW 20 MIN: CPT | Performed by: NURSE PRACTITIONER

## 2022-05-20 PROCEDURE — 87428 SARSCOV & INF VIR A&B AG IA: CPT | Performed by: NURSE PRACTITIONER

## 2022-05-20 RX ORDER — ONDANSETRON HYDROCHLORIDE 8 MG/1
8 TABLET, FILM COATED ORAL EVERY 8 HOURS PRN
Qty: 30 TABLET | Refills: 0 | Status: SHIPPED | OUTPATIENT
Start: 2022-05-20 | End: 2022-05-23

## 2022-05-20 RX ORDER — VILAZODONE HYDROCHLORIDE 40 MG/1
40 TABLET ORAL DAILY
COMMUNITY
Start: 2022-04-17 | End: 2022-10-04 | Stop reason: SDUPTHER

## 2022-05-20 RX ORDER — FLUCONAZOLE 150 MG/1
150 TABLET ORAL ONCE
Qty: 1 TABLET | Refills: 0 | Status: SHIPPED | OUTPATIENT
Start: 2022-05-20 | End: 2022-05-20

## 2022-05-20 RX ORDER — AMOXICILLIN AND CLAVULANATE POTASSIUM 875; 125 MG/1; MG/1
1 TABLET, FILM COATED ORAL 2 TIMES DAILY
Qty: 14 TABLET | Refills: 0 | Status: SHIPPED | OUTPATIENT
Start: 2022-05-20 | End: 2022-05-27

## 2022-05-20 NOTE — PROGRESS NOTES
"Chief Complaint  Sinusitis (Pt c/o sinus infection, nasal congestion, feeling nauseous, loose stool./)    Subjective          Travis Yancey presents to [facility]  Symptoms x 3 days    URI   This is a new problem. The current episode started in the past 7 days. The problem has been gradually worsening. There has been no fever. Associated symptoms include diarrhea, headaches, nausea, rhinorrhea, sinus pain and sneezing. Pertinent negatives include no congestion, coughing, ear pain, plugged ear sensation, sore throat, swollen glands, vomiting or wheezing. She has tried antihistamine (Singulair, Flonase, Zofran) for the symptoms. The treatment provided no relief.       Objective   Vital Signs:   /61 (BP Location: Left arm, Patient Position: Sitting)   Pulse 86   Temp 98.9 °F (37.2 °C) (Oral)   Ht 161 cm (63.39\")   Wt 107 kg (236 lb 14.4 oz)   SpO2 97% Comment: room air  BMI 41.45 kg/m²     Physical Exam  Constitutional:       Appearance: Normal appearance. She is obese.   HENT:      Head: Normocephalic.      Right Ear: Ear canal and external ear normal. Tympanic membrane is scarred.      Left Ear: Tympanic membrane, ear canal and external ear normal.      Nose:      Right Sinus: Maxillary sinus tenderness and frontal sinus tenderness present.      Left Sinus: Maxillary sinus tenderness and frontal sinus tenderness present.      Mouth/Throat:      Mouth: Mucous membranes are moist.      Pharynx: Oropharynx is clear. No oropharyngeal exudate or posterior oropharyngeal erythema.   Eyes:      Conjunctiva/sclera: Conjunctivae normal.      Pupils: Pupils are equal, round, and reactive to light.   Cardiovascular:      Rate and Rhythm: Normal rate and regular rhythm.      Pulses: Normal pulses.      Heart sounds: Normal heart sounds.   Pulmonary:      Effort: Pulmonary effort is normal.      Breath sounds: Normal breath sounds.   Abdominal:      General: Abdomen is flat. Bowel " sounds are normal. There is no distension.      Palpations: Abdomen is soft. There is no mass.      Tenderness: There is abdominal tenderness (LLQ).   Musculoskeletal:      Cervical back: Normal range of motion.   Lymphadenopathy:      Cervical: No cervical adenopathy.   Neurological:      Mental Status: She is alert and oriented to person, place, and time.   Psychiatric:         Mood and Affect: Mood normal.         Behavior: Behavior normal.         Thought Content: Thought content normal.        Result Review :   The following data was reviewed by: JUNO Vazquez on 05/20/2022:                  Assessment and Plan    Diagnoses and all orders for this visit:    1. Nasal congestion (Primary)  -     POCT SARS-CoV-2 Antigen TIM + Flu    2. Acute non-recurrent pansinusitis  -     amoxicillin-clavulanate (Augmentin) 875-125 MG per tablet; Take 1 tablet by mouth 2 (Two) Times a Day for 7 days.  Dispense: 14 tablet; Refill: 0    3. Nausea  -     ondansetron (Zofran) 8 MG tablet; Take 1 tablet by mouth Every 8 (Eight) Hours As Needed for Nausea or Vomiting. May take 0.5-1 tablet q8h prn for nausea  Dispense: 30 tablet; Refill: 0    4. Antibiotic-induced yeast infection  -     fluconazole (Diflucan) 150 MG tablet; Take 1 tablet by mouth 1 (One) Time for 1 dose.  Dispense: 1 tablet; Refill: 0    Covid and flu are negative.  She is very tender on her sinuses.  We will send in Augmentin.  We will also send in Diflucan, she gets yeast infections.  We will send in Zofran half to 1 tablet every 8 hours as needed for nausea.  He does have some tenderness in her left lower quadrant.  We have discussed that treatment guidelines for uncomplicated diverticulitis is now conservative.      Follow Up   Return if symptoms worsen or fail to improve.  Patient was given instructions and counseling regarding her condition or for health maintenance advice. Please see specific information pulled into the AVS if appropriate.

## 2022-05-23 ENCOUNTER — TELEPHONE (OUTPATIENT)
Dept: FAMILY MEDICINE CLINIC | Age: 57
End: 2022-05-23

## 2022-05-23 DIAGNOSIS — R11.0 NAUSEA: Primary | ICD-10-CM

## 2022-05-23 RX ORDER — ONDANSETRON 8 MG/1
8 TABLET, ORALLY DISINTEGRATING ORAL EVERY 8 HOURS PRN
Qty: 15 TABLET | Refills: 0 | Status: SHIPPED | OUTPATIENT
Start: 2022-05-23 | End: 2022-06-14

## 2022-05-23 NOTE — TELEPHONE ENCOUNTER
Pt calling to state the zofran given to her was in a pill form to swallow and it is not helping her. She is wanting to know if the 8mg zofran can be resent using the ODT, please adv.

## 2022-05-27 RX ORDER — MONTELUKAST SODIUM 10 MG/1
10 TABLET ORAL NIGHTLY
Qty: 90 TABLET | Refills: 0 | Status: SHIPPED | OUTPATIENT
Start: 2022-05-27

## 2022-06-02 RX ORDER — VALACYCLOVIR HYDROCHLORIDE 1 G/1
TABLET, FILM COATED ORAL
Qty: 4 TABLET | Refills: 2 | Status: SHIPPED | OUTPATIENT
Start: 2022-06-02 | End: 2022-07-22 | Stop reason: SDUPTHER

## 2022-06-14 DIAGNOSIS — R11.0 NAUSEA: ICD-10-CM

## 2022-06-14 RX ORDER — ONDANSETRON 8 MG/1
TABLET, ORALLY DISINTEGRATING ORAL
Qty: 15 TABLET | Refills: 0 | Status: SHIPPED | OUTPATIENT
Start: 2022-06-14 | End: 2022-07-14 | Stop reason: SDUPTHER

## 2022-06-24 ENCOUNTER — TRANSCRIBE ORDERS (OUTPATIENT)
Dept: ADMINISTRATIVE | Facility: HOSPITAL | Age: 57
End: 2022-06-24

## 2022-06-24 DIAGNOSIS — Z01.818 OTHER SPECIFIED PRE-OPERATIVE EXAMINATION: Primary | ICD-10-CM

## 2022-06-25 ENCOUNTER — LAB (OUTPATIENT)
Dept: LAB | Facility: HOSPITAL | Age: 57
End: 2022-06-25

## 2022-06-25 DIAGNOSIS — Z01.818 OTHER SPECIFIED PRE-OPERATIVE EXAMINATION: ICD-10-CM

## 2022-06-25 LAB — SARS-COV-2 RNA PNL SPEC NAA+PROBE: NOT DETECTED

## 2022-06-25 PROCEDURE — U0004 COV-19 TEST NON-CDC HGH THRU: HCPCS

## 2022-06-27 RX ORDER — SENNOSIDES 8.6 MG
650 CAPSULE ORAL EVERY 8 HOURS PRN
COMMUNITY

## 2022-06-28 ENCOUNTER — ANESTHESIA (OUTPATIENT)
Dept: GASTROENTEROLOGY | Facility: HOSPITAL | Age: 57
End: 2022-06-28

## 2022-06-28 ENCOUNTER — ANESTHESIA EVENT (OUTPATIENT)
Dept: GASTROENTEROLOGY | Facility: HOSPITAL | Age: 57
End: 2022-06-28

## 2022-06-28 ENCOUNTER — HOSPITAL ENCOUNTER (OUTPATIENT)
Facility: HOSPITAL | Age: 57
Setting detail: HOSPITAL OUTPATIENT SURGERY
Discharge: HOME | End: 2022-06-28
Attending: SURGERY | Admitting: SURGERY

## 2022-06-28 VITALS
HEIGHT: 65 IN | WEIGHT: 242 LBS | BODY MASS INDEX: 40.32 KG/M2 | SYSTOLIC BLOOD PRESSURE: 121 MMHG | OXYGEN SATURATION: 95 % | HEART RATE: 69 BPM | DIASTOLIC BLOOD PRESSURE: 83 MMHG | RESPIRATION RATE: 16 BRPM

## 2022-06-28 DIAGNOSIS — E66.01 OBESITY, CLASS III, BMI 40-49.9 (MORBID OBESITY): ICD-10-CM

## 2022-06-28 DIAGNOSIS — M54.50 CHRONIC BILATERAL LOW BACK PAIN WITHOUT SCIATICA: ICD-10-CM

## 2022-06-28 DIAGNOSIS — F90.9 ATTENTION DEFICIT HYPERACTIVITY DISORDER (ADHD), UNSPECIFIED ADHD TYPE: ICD-10-CM

## 2022-06-28 DIAGNOSIS — F32.A DEPRESSION, UNSPECIFIED DEPRESSION TYPE: ICD-10-CM

## 2022-06-28 DIAGNOSIS — D64.9 ANEMIA, UNSPECIFIED TYPE: ICD-10-CM

## 2022-06-28 DIAGNOSIS — F31.60 BIPOLAR AFFECTIVE DISORDER, CURRENT EPISODE MIXED, CURRENT EPISODE SEVERITY UNSPECIFIED: ICD-10-CM

## 2022-06-28 DIAGNOSIS — F41.9 ANXIETY: ICD-10-CM

## 2022-06-28 DIAGNOSIS — F43.10 PTSD (POST-TRAUMATIC STRESS DISORDER): ICD-10-CM

## 2022-06-28 DIAGNOSIS — K21.9 GASTROESOPHAGEAL REFLUX DISEASE, UNSPECIFIED WHETHER ESOPHAGITIS PRESENT: ICD-10-CM

## 2022-06-28 DIAGNOSIS — Z99.89 OSA ON CPAP: ICD-10-CM

## 2022-06-28 DIAGNOSIS — G89.29 CHRONIC BILATERAL LOW BACK PAIN WITHOUT SCIATICA: ICD-10-CM

## 2022-06-28 DIAGNOSIS — E66.2 OBESITY HYPOVENTILATION SYNDROME: ICD-10-CM

## 2022-06-28 DIAGNOSIS — Z71.3 DIETARY COUNSELING: ICD-10-CM

## 2022-06-28 DIAGNOSIS — G47.33 OSA ON CPAP: ICD-10-CM

## 2022-06-28 DIAGNOSIS — E11.42 TYPE 2 DIABETES MELLITUS WITH DIABETIC POLYNEUROPATHY, WITHOUT LONG-TERM CURRENT USE OF INSULIN: ICD-10-CM

## 2022-06-28 DIAGNOSIS — I10 ESSENTIAL HYPERTENSION: ICD-10-CM

## 2022-06-28 DIAGNOSIS — I50.32 CHRONIC DIASTOLIC (CONGESTIVE) HEART FAILURE: ICD-10-CM

## 2022-06-28 DIAGNOSIS — E78.2 MIXED HYPERLIPIDEMIA: ICD-10-CM

## 2022-06-28 LAB — GLUCOSE BLDC GLUCOMTR-MCNC: 135 MG/DL (ref 70–130)

## 2022-06-28 PROCEDURE — 43239 EGD BIOPSY SINGLE/MULTIPLE: CPT | Performed by: SURGERY

## 2022-06-28 PROCEDURE — 87081 CULTURE SCREEN ONLY: CPT | Performed by: SURGERY

## 2022-06-28 PROCEDURE — 82962 GLUCOSE BLOOD TEST: CPT

## 2022-06-28 PROCEDURE — 25010000002 PROPOFOL 10 MG/ML EMULSION: Performed by: ANESTHESIOLOGY

## 2022-06-28 RX ORDER — PROPOFOL 10 MG/ML
VIAL (ML) INTRAVENOUS CONTINUOUS PRN
Status: DISCONTINUED | OUTPATIENT
Start: 2022-06-28 | End: 2022-06-28 | Stop reason: SURG

## 2022-06-28 RX ORDER — SODIUM CHLORIDE 0.9 % (FLUSH) 0.9 %
10 SYRINGE (ML) INJECTION AS NEEDED
Status: DISCONTINUED | OUTPATIENT
Start: 2022-06-28 | End: 2022-06-28 | Stop reason: HOSPADM

## 2022-06-28 RX ORDER — LIDOCAINE HYDROCHLORIDE 20 MG/ML
INJECTION, SOLUTION INFILTRATION; PERINEURAL AS NEEDED
Status: DISCONTINUED | OUTPATIENT
Start: 2022-06-28 | End: 2022-06-28 | Stop reason: SURG

## 2022-06-28 RX ORDER — SODIUM CHLORIDE, SODIUM LACTATE, POTASSIUM CHLORIDE, CALCIUM CHLORIDE 600; 310; 30; 20 MG/100ML; MG/100ML; MG/100ML; MG/100ML
30 INJECTION, SOLUTION INTRAVENOUS CONTINUOUS PRN
Status: DISCONTINUED | OUTPATIENT
Start: 2022-06-28 | End: 2022-06-28 | Stop reason: HOSPADM

## 2022-06-28 RX ORDER — PROPOFOL 10 MG/ML
VIAL (ML) INTRAVENOUS AS NEEDED
Status: DISCONTINUED | OUTPATIENT
Start: 2022-06-28 | End: 2022-06-28 | Stop reason: SURG

## 2022-06-28 RX ORDER — SODIUM CHLORIDE 0.9 % (FLUSH) 0.9 %
10 SYRINGE (ML) INJECTION EVERY 12 HOURS SCHEDULED
Status: DISCONTINUED | OUTPATIENT
Start: 2022-06-28 | End: 2022-06-28 | Stop reason: HOSPADM

## 2022-06-28 RX ADMIN — Medication 200 MCG/KG/MIN: at 08:38

## 2022-06-28 RX ADMIN — SODIUM CHLORIDE, POTASSIUM CHLORIDE, SODIUM LACTATE AND CALCIUM CHLORIDE 30 ML/HR: 600; 310; 30; 20 INJECTION, SOLUTION INTRAVENOUS at 08:27

## 2022-06-28 RX ADMIN — LIDOCAINE HYDROCHLORIDE 60 MG: 20 INJECTION, SOLUTION INFILTRATION; PERINEURAL at 08:38

## 2022-06-28 RX ADMIN — PROPOFOL 200 MG: 10 INJECTION, EMULSION INTRAVENOUS at 08:38

## 2022-06-28 NOTE — ANESTHESIA PREPROCEDURE EVALUATION
Anesthesia Evaluation     Patient summary reviewed and Nursing notes reviewed                Airway   Mallampati: III  TM distance: >3 FB  Neck ROM: full  Possible difficult intubation  Dental    (+) poor dentition    Comment: None loose    Pulmonary - normal exam   (+) a smoker Former, sleep apnea on CPAP,   Cardiovascular - normal exam    ECG reviewed    (+) hypertension 2 medications or greater, CHF , hyperlipidemia,     ROS comment: EF 60% by ECHO 10/21/no CAD by cath 10/21    Neuro/Psych  (+) headaches, syncope, numbness, psychiatric history Anxiety, Bipolar, Depression, PTSD and ADHD,      ROS Comment: Migraines/diabetic polyneuropathy  GI/Hepatic/Renal/Endo    (+) obesity, morbid obesity, GERD,  renal disease, diabetes mellitus type 2,     Musculoskeletal     (+) back pain, chronic pain, myalgias,       ROS comment: Fibromyalgia  Abdominal   (+) obese,    Substance History      OB/GYN          Other   arthritis,                    Anesthesia Plan    ASA 3     MAC     intravenous induction     Anesthetic plan, risks, benefits, and alternatives have been provided, discussed and informed consent has been obtained with: patient.        CODE STATUS:

## 2022-06-28 NOTE — ANESTHESIA POSTPROCEDURE EVALUATION
Patient: Travis Yancey    Procedure Summary     Date: 06/28/22 Room / Location:  [facility]    Anesthesia Start: 0834 Anesthesia Stop: 0847    Procedure: ESOPHAGOGASTRODUODENOSCOPY WITH BIOPSY (N/A Esophagus) Diagnosis:       Obesity, Class III, BMI 40-49.9 (morbid obesity) (HCC)      Essential hypertension      Mixed hyperlipidemia      Gastroesophageal reflux disease, unspecified whether esophagitis present      Chronic diastolic (congestive) heart failure (HCC)      Anemia, unspecified type      Anxiety      Bipolar affective disorder, current episode mixed, current episode severity unspecified (HCC)      Depression, unspecified depression type      PTSD (post-traumatic stress disorder)      Attention deficit hyperactivity disorder (ADHD), unspecified ADHD type      Chronic bilateral low back pain without sciatica      Type 2 diabetes mellitus with diabetic polyneuropathy, without long-term current use of insulin (HCC)      BREE on CPAP      Obesity hypoventilation syndrome (HCC)      Dietary counseling      (Obesity, Class III, BMI 40-49.9 (morbid obesity) (HCC) [E66.01])      (Essential hypertension [I10])      (Mixed hyperlipidemia [E78.2])      (Gastroesophageal reflux disease, unspecified whether esophagitis present [K21.9])      (Chronic diastolic (congestive) heart failure (HCC) [I50.32])      (Anemia, unspecified type [D64.9])      (Anxiety [F41.9])      (Bipolar affective disorder, current episode mixed, current episode severity unspecified (HCC) [F31.60])      (Depression, unspecified depression type [F32.A])      (PTSD (post-traumatic stress disorder) [F43.10])      (Attention deficit hyperactivity disorder (ADHD), unspecified ADHD type [F90.9])      (Chronic bilateral low back pain without sciatica [M54.50, G89.29])      (Type 2 diabetes mellitus with diabetic polyneuropathy, without long-term current use of insulin ([facility]) [E11.42])      (BREE on CPAP [G47.33, Z99.89])       (Obesity hypoventilation syndrome (HCC) [E66.2])      (Dietary counseling [Z71.3])    Surgeons: Jaxon Belle Jr., MD Provider: Francois Pressley MD    Anesthesia Type: MAC ASA Status: 3          Anesthesia Type: MAC    Vitals  No vitals data found for the desired time range.          Post Anesthesia Care and Evaluation    Patient location during evaluation: PHASE II  Patient participation: complete - patient participated  Level of consciousness: awake and alert  Pain management: adequate    Airway patency: patent  Anesthetic complications: No anesthetic complications  PONV Status: none  Cardiovascular status: acceptable and hemodynamically stable  Respiratory status: acceptable, nonlabored ventilation and spontaneous ventilation  Hydration status: acceptable

## 2022-06-29 ENCOUNTER — TELEPHONE (OUTPATIENT)
Dept: BARIATRICS/WEIGHT MGMT | Facility: CLINIC | Age: 57
End: 2022-06-29

## 2022-06-29 LAB — UREASE TISS QL: NEGATIVE

## 2022-06-29 NOTE — TELEPHONE ENCOUNTER
inform about results EGD  ----- Message from Jaxon Belle Jr., MD sent at 6/29/2022 11:20 AM EDT -----  Please call patient with negative results.

## 2022-07-14 DIAGNOSIS — R11.0 NAUSEA: ICD-10-CM

## 2022-07-14 RX ORDER — ONDANSETRON 8 MG/1
8 TABLET, ORALLY DISINTEGRATING ORAL EVERY 8 HOURS PRN
Qty: 30 TABLET | Refills: 1 | Status: SHIPPED | OUTPATIENT
Start: 2022-07-14

## 2022-07-22 RX ORDER — VALACYCLOVIR HYDROCHLORIDE 1 G/1
2000 TABLET, FILM COATED ORAL 2 TIMES DAILY
Qty: 4 TABLET | Refills: 2 | Status: SHIPPED | OUTPATIENT
Start: 2022-07-22

## 2022-08-15 DIAGNOSIS — E78.2 MIXED HYPERLIPIDEMIA: ICD-10-CM

## 2022-08-15 DIAGNOSIS — I50.32 CHRONIC DIASTOLIC (CONGESTIVE) HEART FAILURE: ICD-10-CM

## 2022-08-15 DIAGNOSIS — E11.42 TYPE 2 DIABETES MELLITUS WITH DIABETIC POLYNEUROPATHY, WITHOUT LONG-TERM CURRENT USE OF INSULIN: ICD-10-CM

## 2022-08-15 DIAGNOSIS — I10 ESSENTIAL HYPERTENSION: ICD-10-CM

## 2022-08-15 RX ORDER — METFORMIN HYDROCHLORIDE 500 MG/1
1000 TABLET, EXTENDED RELEASE ORAL 2 TIMES DAILY WITH MEALS
Qty: 360 TABLET | Refills: 1 | Status: SHIPPED | OUTPATIENT
Start: 2022-08-15 | End: 2023-04-03

## 2022-08-15 NOTE — TELEPHONE ENCOUNTER
Caller: Travis Yancey    Relationship: Self    Best call back number: [phone]     Requested Prescriptions:   Requested Prescriptions     Pending Prescriptions Disp Refills   • metFORMIN ER (GLUCOPHAGE-XR) 500 MG 24 hr tablet 360 tablet 1     Sig: Take 2 tablets by mouth 2 (Two) Times a Day With Meals.        Pharmacy where request should be sent: [id number] - [facility] [place] - [address] AT [address] - [phone] [facility] [phone] FX     Additional details provided by patient: PATIENT STATED THAT SHE LOST HER MEDICATION/ HER PURSE GOT STOLEN. PLEASE CALL AND ADVISE.     Does the patient have less than a 3 day supply:  [x] Yes  [] No    Molly Reeves Rep   08/15/22 13:39 EDT

## 2022-08-16 RX ORDER — POTASSIUM CHLORIDE 750 MG/1
10 CAPSULE, EXTENDED RELEASE ORAL DAILY
Qty: 90 CAPSULE | Refills: 1 | Status: SHIPPED | OUTPATIENT
Start: 2022-08-16

## 2022-08-16 RX ORDER — FENOFIBRATE 134 MG/1
134 CAPSULE ORAL DAILY
Qty: 90 CAPSULE | Refills: 1 | Status: SHIPPED | OUTPATIENT
Start: 2022-08-16

## 2022-08-17 ENCOUNTER — TELEPHONE (OUTPATIENT)
Dept: FAMILY MEDICINE CLINIC | Age: 57
End: 2022-08-17

## 2022-08-17 ENCOUNTER — READMISSION MANAGEMENT (OUTPATIENT)
Dept: CALL CENTER | Facility: HOSPITAL | Age: 57
End: 2022-08-17

## 2022-08-17 NOTE — TELEPHONE ENCOUNTER
Caller: CAIT DENTON WITH [facility]     Relationship to patient: RN     Best call back number: [phone]    New or established patient?  [] New  [x] Established    Date of discharge:08/17/2022    Facility discharged from: [facility]     Diagnosis/Symptoms: CHEST PAIN, WHEEZING, COPD     Length of stay (If applicable): ADMITTED ON 08/16/2022 DISCHARGED ON 08/17/2022    Specialty Only: Did you see a [facility] health provider?    [] Yes  [x] No  If so, who?

## 2022-08-18 ENCOUNTER — HOSPITAL ENCOUNTER (EMERGENCY)
Facility: HOSPITAL | Age: 57
Discharge: HOME | End: 2022-08-18
Attending: STUDENT IN AN ORGANIZED HEALTH CARE EDUCATION/TRAINING PROGRAM | Admitting: STUDENT IN AN ORGANIZED HEALTH CARE EDUCATION/TRAINING PROGRAM

## 2022-08-18 ENCOUNTER — APPOINTMENT (OUTPATIENT)
Dept: GENERAL RADIOLOGY | Facility: HOSPITAL | Age: 57
End: 2022-08-18

## 2022-08-18 ENCOUNTER — TRANSITIONAL CARE MANAGEMENT TELEPHONE ENCOUNTER (OUTPATIENT)
Dept: CALL CENTER | Facility: HOSPITAL | Age: 57
End: 2022-08-18

## 2022-08-18 VITALS
HEART RATE: 62 BPM | SYSTOLIC BLOOD PRESSURE: 124 MMHG | DIASTOLIC BLOOD PRESSURE: 76 MMHG | RESPIRATION RATE: 17 BRPM | TEMPERATURE: 98.2 F | BODY MASS INDEX: 39.52 KG/M2 | WEIGHT: 237.22 LBS | OXYGEN SATURATION: 96 % | HEIGHT: 65 IN

## 2022-08-18 DIAGNOSIS — R07.9 CHEST PAIN, UNSPECIFIED TYPE: Primary | ICD-10-CM

## 2022-08-18 DIAGNOSIS — R06.02 SHORTNESS OF BREATH: ICD-10-CM

## 2022-08-18 LAB
ALBUMIN SERPL-MCNC: 4.2 G/DL (ref 3.5–5.2)
ALBUMIN/GLOB SERPL: 1.5 G/DL
ALP SERPL-CCNC: 59 U/L (ref 39–117)
ALT SERPL W P-5'-P-CCNC: 51 U/L (ref 1–33)
ANION GAP SERPL CALCULATED.3IONS-SCNC: 13 MMOL/L (ref 5–15)
AST SERPL-CCNC: 91 U/L (ref 1–32)
BASOPHILS # BLD AUTO: 0.07 10*3/MM3 (ref 0–0.2)
BASOPHILS NFR BLD AUTO: 0.9 % (ref 0–1.5)
BILIRUB SERPL-MCNC: 0.3 MG/DL (ref 0–1.2)
BUN SERPL-MCNC: 20 MG/DL (ref 6–20)
BUN/CREAT SERPL: 25.6 (ref 7–25)
CALCIUM SPEC-SCNC: 9.1 MG/DL (ref 8.6–10.5)
CHLORIDE SERPL-SCNC: 104 MMOL/L (ref 98–107)
CK MB SERPL-CCNC: 1.36 NG/ML
CK SERPL-CCNC: 28 U/L (ref 20–180)
CO2 SERPL-SCNC: 23 MMOL/L (ref 22–29)
CREAT SERPL-MCNC: 0.78 MG/DL (ref 0.57–1)
D DIMER PPP FEU-MCNC: 0.32 MCGFEU/ML (ref 0–0.57)
DEPRECATED RDW RBC AUTO: 44.4 FL (ref 37–54)
EGFRCR SERPLBLD CKD-EPI 2021: 90.4 ML/MIN/1.73
EOSINOPHIL # BLD AUTO: 0.07 10*3/MM3 (ref 0–0.4)
EOSINOPHIL NFR BLD AUTO: 0.9 % (ref 0.3–6.2)
ERYTHROCYTE [DISTWIDTH] IN BLOOD BY AUTOMATED COUNT: 14.8 % (ref 12.3–15.4)
GLOBULIN UR ELPH-MCNC: 2.8 GM/DL
GLUCOSE SERPL-MCNC: 130 MG/DL (ref 65–99)
HCT VFR BLD AUTO: 35.4 % (ref 34–46.6)
HGB BLD-MCNC: 11.3 G/DL (ref 12–15.9)
HOLD SPECIMEN: NORMAL
IMM GRANULOCYTES # BLD AUTO: 0.13 10*3/MM3 (ref 0–0.05)
IMM GRANULOCYTES NFR BLD AUTO: 1.6 % (ref 0–0.5)
LIPASE SERPL-CCNC: 45 U/L (ref 13–60)
LYMPHOCYTES # BLD AUTO: 1.65 10*3/MM3 (ref 0.7–3.1)
LYMPHOCYTES NFR BLD AUTO: 20.3 % (ref 19.6–45.3)
MAGNESIUM SERPL-MCNC: 2 MG/DL (ref 1.6–2.6)
MCH RBC QN AUTO: 26.2 PG (ref 26.6–33)
MCHC RBC AUTO-ENTMCNC: 31.9 G/DL (ref 31.5–35.7)
MCV RBC AUTO: 82.1 FL (ref 79–97)
MONOCYTES # BLD AUTO: 0.26 10*3/MM3 (ref 0.1–0.9)
MONOCYTES NFR BLD AUTO: 3.2 % (ref 5–12)
NEUTROPHILS NFR BLD AUTO: 5.95 10*3/MM3 (ref 1.7–7)
NEUTROPHILS NFR BLD AUTO: 73.1 % (ref 42.7–76)
NRBC BLD AUTO-RTO: 0 /100 WBC (ref 0–0.2)
NT-PROBNP SERPL-MCNC: 421.7 PG/ML (ref 0–900)
PLATELET # BLD AUTO: 260 10*3/MM3 (ref 140–450)
PMV BLD AUTO: 12 FL (ref 6–12)
POTASSIUM SERPL-SCNC: 3.9 MMOL/L (ref 3.5–5.2)
PROT SERPL-MCNC: 7 G/DL (ref 6–8.5)
RBC # BLD AUTO: 4.31 10*6/MM3 (ref 3.77–5.28)
SODIUM SERPL-SCNC: 140 MMOL/L (ref 136–145)
TROPONIN I SERPL-MCNC: 0 NG/ML (ref 0–0.08)
TROPONIN I SERPL-MCNC: 0 NG/ML (ref 0–0.08)
WBC NRBC COR # BLD: 8.13 10*3/MM3 (ref 3.4–10.8)
WHOLE BLOOD HOLD COAG: NORMAL
WHOLE BLOOD HOLD SPECIMEN: NORMAL

## 2022-08-18 PROCEDURE — 93010 ELECTROCARDIOGRAM REPORT: CPT | Performed by: INTERNAL MEDICINE

## 2022-08-18 PROCEDURE — 25010000002 METHYLPREDNISOLONE PER 125 MG: Performed by: STUDENT IN AN ORGANIZED HEALTH CARE EDUCATION/TRAINING PROGRAM

## 2022-08-18 PROCEDURE — 93005 ELECTROCARDIOGRAM TRACING: CPT

## 2022-08-18 PROCEDURE — 93005 ELECTROCARDIOGRAM TRACING: CPT | Performed by: STUDENT IN AN ORGANIZED HEALTH CARE EDUCATION/TRAINING PROGRAM

## 2022-08-18 PROCEDURE — 99284 EMERGENCY DEPT VISIT MOD MDM: CPT

## 2022-08-18 PROCEDURE — 71045 X-RAY EXAM CHEST 1 VIEW: CPT

## 2022-08-18 PROCEDURE — 85379 FIBRIN DEGRADATION QUANT: CPT | Performed by: STUDENT IN AN ORGANIZED HEALTH CARE EDUCATION/TRAINING PROGRAM

## 2022-08-18 PROCEDURE — 83735 ASSAY OF MAGNESIUM: CPT

## 2022-08-18 PROCEDURE — 94799 UNLISTED PULMONARY SVC/PX: CPT

## 2022-08-18 PROCEDURE — 83880 ASSAY OF NATRIURETIC PEPTIDE: CPT

## 2022-08-18 PROCEDURE — 96374 THER/PROPH/DIAG INJ IV PUSH: CPT

## 2022-08-18 PROCEDURE — 96375 TX/PRO/DX INJ NEW DRUG ADDON: CPT

## 2022-08-18 PROCEDURE — 82550 ASSAY OF CK (CPK): CPT

## 2022-08-18 PROCEDURE — 80053 COMPREHEN METABOLIC PANEL: CPT

## 2022-08-18 PROCEDURE — 25010000002 ONDANSETRON PER 1 MG: Performed by: STUDENT IN AN ORGANIZED HEALTH CARE EDUCATION/TRAINING PROGRAM

## 2022-08-18 PROCEDURE — 94664 DEMO&/EVAL PT USE INHALER: CPT

## 2022-08-18 PROCEDURE — 94640 AIRWAY INHALATION TREATMENT: CPT

## 2022-08-18 PROCEDURE — 82553 CREATINE MB FRACTION: CPT

## 2022-08-18 PROCEDURE — 84484 ASSAY OF TROPONIN QUANT: CPT

## 2022-08-18 PROCEDURE — 85025 COMPLETE CBC W/AUTO DIFF WBC: CPT

## 2022-08-18 PROCEDURE — 83690 ASSAY OF LIPASE: CPT

## 2022-08-18 PROCEDURE — 36415 COLL VENOUS BLD VENIPUNCTURE: CPT

## 2022-08-18 RX ORDER — PREDNISONE 20 MG/1
40 TABLET ORAL DAILY
Qty: 10 TABLET | Refills: 0 | Status: SHIPPED | OUTPATIENT
Start: 2022-08-18 | End: 2022-08-23

## 2022-08-18 RX ORDER — ALUMINA, MAGNESIA, AND SIMETHICONE 2400; 2400; 240 MG/30ML; MG/30ML; MG/30ML
15 SUSPENSION ORAL ONCE
Status: COMPLETED | OUTPATIENT
Start: 2022-08-18 | End: 2022-08-18

## 2022-08-18 RX ORDER — ASPIRIN 81 MG/1
324 TABLET, CHEWABLE ORAL ONCE
Status: COMPLETED | OUTPATIENT
Start: 2022-08-18 | End: 2022-08-18

## 2022-08-18 RX ORDER — SODIUM CHLORIDE 0.9 % (FLUSH) 0.9 %
10 SYRINGE (ML) INJECTION AS NEEDED
Status: DISCONTINUED | OUTPATIENT
Start: 2022-08-18 | End: 2022-08-18 | Stop reason: HOSPADM

## 2022-08-18 RX ORDER — ONDANSETRON 2 MG/ML
4 INJECTION INTRAMUSCULAR; INTRAVENOUS ONCE
Status: COMPLETED | OUTPATIENT
Start: 2022-08-18 | End: 2022-08-18

## 2022-08-18 RX ORDER — LIDOCAINE HYDROCHLORIDE 20 MG/ML
10 SOLUTION OROPHARYNGEAL ONCE
Status: COMPLETED | OUTPATIENT
Start: 2022-08-18 | End: 2022-08-18

## 2022-08-18 RX ORDER — METHYLPREDNISOLONE SODIUM SUCCINATE 125 MG/2ML
125 INJECTION, POWDER, LYOPHILIZED, FOR SOLUTION INTRAMUSCULAR; INTRAVENOUS ONCE
Status: COMPLETED | OUTPATIENT
Start: 2022-08-18 | End: 2022-08-18

## 2022-08-18 RX ORDER — IPRATROPIUM BROMIDE AND ALBUTEROL SULFATE 2.5; .5 MG/3ML; MG/3ML
7.5 SOLUTION RESPIRATORY (INHALATION) CONTINUOUS
Status: ACTIVE | OUTPATIENT
Start: 2022-08-18 | End: 2022-08-18

## 2022-08-18 RX ORDER — FAMOTIDINE 10 MG/ML
20 INJECTION, SOLUTION INTRAVENOUS ONCE
Status: COMPLETED | OUTPATIENT
Start: 2022-08-18 | End: 2022-08-18

## 2022-08-18 RX ORDER — HYDROCODONE BITARTRATE AND ACETAMINOPHEN 5; 325 MG/1; MG/1
1 TABLET ORAL ONCE
Status: COMPLETED | OUTPATIENT
Start: 2022-08-18 | End: 2022-08-18

## 2022-08-18 RX ADMIN — ASPIRIN 81 MG CHEWABLE TABLET 324 MG: 81 TABLET CHEWABLE at 15:13

## 2022-08-18 RX ADMIN — LIDOCAINE HYDROCHLORIDE 10 ML: 20 SOLUTION ORAL; TOPICAL at 16:08

## 2022-08-18 RX ADMIN — METHYLPREDNISOLONE SODIUM SUCCINATE 125 MG: 125 INJECTION, POWDER, FOR SOLUTION INTRAMUSCULAR; INTRAVENOUS at 15:47

## 2022-08-18 RX ADMIN — HYDROCODONE BITARTRATE AND ACETAMINOPHEN 1 TABLET: 5; 325 TABLET ORAL at 18:28

## 2022-08-18 RX ADMIN — FAMOTIDINE 20 MG: 10 INJECTION INTRAVENOUS at 16:08

## 2022-08-18 RX ADMIN — ALUMINUM HYDROXIDE, MAGNESIUM HYDROXIDE, AND DIMETHICONE 15 ML: 400; 400; 40 SUSPENSION ORAL at 16:08

## 2022-08-18 RX ADMIN — ONDANSETRON 4 MG: 2 INJECTION INTRAMUSCULAR; INTRAVENOUS at 16:08

## 2022-08-18 RX ADMIN — IPRATROPIUM BROMIDE AND ALBUTEROL SULFATE 7.5 ML: 2.5; .5 SOLUTION RESPIRATORY (INHALATION) at 15:43

## 2022-08-18 NOTE — ED PROVIDER NOTES
Time: 2:46 PM EDT  Arrived by: private car  Chief Complaint: shortness of breath, chest pain  History provided by: patient  History is limited by: N/A     History of Present Illness:  Patient is a 56 y.o. year old female who presents to the emergency department with complaints of mid sternal chest pain x 4 days. Pt described the pain as tight, squeezing and as if someone is sitting on her chest. She reports pain radiating to her neck. States the pain worsens with deep inspirations. She was admitted for these symptoms. Pt had a CTPE performed on 8/16 which was negative for PE. Pt was released yesterday; states she wasn't feeling okay then and is feeling worse now. She admits to shortness of breath starting this morning.    Pt has a hx of hypertension, CHF, COPD and diabetes.      History provided by:  Patient   used: No        Similar Symptoms Previously: yes  Recently seen: yes      Patient Care Team  Primary Care Provider: Jeferson Harmon MD    Past Medical History:     No Known Allergies  Past Medical History:   Diagnosis Date   • ADHD (attention deficit hyperactivity disorder)    • Anxiety    • Arthritis    • Congenital hip dysplasia    • Depression    • Fibromyalgia    • Hypertension    • Mixed hyperlipidemia    • Type 2 diabetes mellitus (HCC)      Past Surgical History:   Procedure Laterality Date   • APPENDECTOMY     • CARDIAC CATHETERIZATION Left 10/13/2021    Procedure: RIGHT AND LEFT HEART CATH;  Surgeon: Jennifer Wilcox MD;  [facility];  Service: Cardiology;  Laterality: Left;  right and left cardiac catheterization   • CARDIAC CATHETERIZATION N/A 10/13/2021    Procedure: Left ventriculography;  Surgeon: Jennifer Wilcox MD;  Location[facility] C[facility];  Service: Cardiology;  Laterality: N/A;   • CARDIAC CATHETERIZATION N/A 10/13/2021    Procedure: Coronary angiography;  Surgeon: Jennifer Wilcox MD;  Location: [facility] CATH INVASIVE  LOCATION;  Service: Cardiology;  Laterality: N/A;   • CERVICAL FUSION      c5-6   •  SECTION     • CHOLECYSTECTOMY     • DISCECTOMY POSTERIOR THORACIC TRANSPEDICULAR APPROACH     • ENDOSCOPY N/A 2022    Procedure: ESOPHAGOGASTRODUODENOSCOPY WITH BIOPSY;  Surgeon: Jaxon Belle Jr., MD;  Location: [facility] ENDOSCOPY;  Service: General;  Laterality: N/A;  PRE-GERD  POST- GASTRITIS   • PARTIAL HIP ARTHROPLASTY Right      Family History   Problem Relation Age of Onset   • Malig Hyperthermia Neg Hx        Home Medications:  Prior to Admission medications    Medication Sig Start Date End Date Taking? Authorizing Provider   acetaminophen (TYLENOL) 650 MG 8 hr tablet Take 650 mg by mouth Every 8 (Eight) Hours As Needed for Mild Pain .    Greg Oswald MD   amphetamine-dextroamphetamine (ADDERALL) 30 MG tablet Take 1 tablet by mouth 2 (two) times a day. 21   Greg Oswald MD   Ca Carbonate-Mag Hydroxide (ROLAIDS PO) Take  by mouth As Needed.    Greg Oswald MD   clonazePAM (KlonoPIN) 1 MG tablet Take 1 tablet by mouth 3 (Three) Times a Day. 21   Greg Oswald MD   Diclofenac Sodium (VOLTAREN) 1 % gel gel Apply  topically to the appropriate area as directed 3 (Three) Times a Day. 3/18/22   Jeferson Harmon MD   fenofibrate micronized (LOFIBRA) 134 MG capsule Take 1 capsule by mouth Daily. 22   Jeferson Harmon MD   fluticasone (FLONASE) 50 MCG/ACT nasal spray 2 sprays into the nostril(s) as directed by provider Daily. 10/15/21   Jeferson Harmon MD   furosemide (LASIX) 40 MG tablet Take 1 tablet by mouth Daily. 22   Jeferson Harmon MD   gabapentin (NEURONTIN) 600 MG tablet Take 1 tablet by mouth 3 (Three) Times a Day. 21   Greg Oswald MD   levocetirizine (XYZAL) 5 MG tablet Take 0.5 tablets by mouth Daily. 22   Jeferson Harmon MD   lidocaine (LIDODERM) 5 % Place 1 patch on the skin as directed by  provider Daily. Remove & Discard patch within 12 hours or as directed by MD 21   Jeferson Harmon MD   metFORMIN ER (GLUCOPHAGE-XR) 500 MG 24 hr tablet Take 2 tablets by mouth 2 (Two) Times a Day With Meals. 8/15/22   Jeferson Harmon MD   methocarbamol (ROBAXIN) 750 MG tablet TAKE 1 TABLET BY MOUTH TWICE DAILY AS NEEDED FOR MUSCLE SPASMS 22   Jeferson Harmon MD   metoprolol tartrate (LOPRESSOR) 25 MG tablet Take 1 tablet by mouth 2 (Two) Times a Day. 22   Jeferson Harmon MD   montelukast (SINGULAIR) 10 MG tablet TAKE 1 TABLET BY MOUTH EVERY NIGHT 22   Jeferson Harmon MD   ondansetron ODT (ZOFRAN-ODT) 8 MG disintegrating tablet Place 1 tablet on the tongue Every 8 (Eight) Hours As Needed for Nausea or Vomiting. 22   Jeferson Harmon MD   pantoprazole (PROTONIX) 40 MG EC tablet Take 1 tablet by mouth Daily. 22   Jeferson Harmon MD   potassium chloride (MICRO-K) 10 MEQ CR capsule Take 1 capsule by mouth Daily. 22   Jeferson Haromn MD   temazepam (RESTORIL) 30 MG capsule Take 1 capsule by mouth Every Night. 21   Greg Oswald MD   traZODone (DESYREL) 50 MG tablet Take 1 tablet by mouth At Night As Needed for Sleep. 21   Greg Oswald MD   valACYclovir (VALTREX) 1000 MG tablet Take 2 tablets by mouth 2 (Two) Times a Day. 22   Jeferson Harmon MD   Viibryd 40 MG tablet tablet Take 40 mg by mouth Daily. 22   Greg Oswald MD        Social History:   Social History     Tobacco Use   • Smoking status: Former Smoker     Types: Cigarettes     Quit date: 2019     Years since quittin.9   • Smokeless tobacco: Never Used   • Tobacco comment: Intermitt. starting age 22, 3 packs a wk   Vaping Use   • Vaping Use: Never used   Substance Use Topics   • Alcohol use: Not Currently   • Drug use: Never     Recent travel: no     Review of Systems:  Review of Systems   Constitutional:  "Negative for chills and fever.   HENT: Negative for congestion, rhinorrhea and sore throat.    Eyes: Negative for pain and visual disturbance.   Respiratory: Positive for chest tightness and shortness of breath. Negative for apnea and cough.    Cardiovascular: Negative for chest pain and palpitations.   Gastrointestinal: Negative for abdominal pain, diarrhea, nausea and vomiting.   Genitourinary: Negative for difficulty urinating and dysuria.   Musculoskeletal: Negative for joint swelling and myalgias.   Skin: Negative for color change.   Neurological: Negative for seizures and headaches.   Psychiatric/Behavioral: Negative.    All other systems reviewed and are negative.       Physical Exam:  /76   Pulse 62   Temp 98.2 °F (36.8 °C) (Oral)   Resp 17   Ht 165.1 cm (65\")   Wt 108 kg (237 lb 3.4 oz)   SpO2 96%   BMI 39.47 kg/m²     Physical Exam  Vitals and nursing note reviewed.   Constitutional:       General: She is not in acute distress.     Appearance: Normal appearance. She is not toxic-appearing.   HENT:      Head: Normocephalic and atraumatic.      Jaw: There is normal jaw occlusion.   Eyes:      General: Lids are normal.      Extraocular Movements: Extraocular movements intact.      Conjunctiva/sclera: Conjunctivae normal.      Pupils: Pupils are equal, round, and reactive to light.   Cardiovascular:      Rate and Rhythm: Normal rate and regular rhythm.      Pulses: Normal pulses.      Heart sounds: Normal heart sounds.   Pulmonary:      Effort: Pulmonary effort is normal. No respiratory distress.      Breath sounds: Normal breath sounds. No wheezing or rhonchi.   Abdominal:      General: Abdomen is flat.      Palpations: Abdomen is soft.      Tenderness: There is no abdominal tenderness. There is no guarding or rebound.   Musculoskeletal:         General: Normal range of motion.      Cervical back: Normal range of motion and neck supple.      Right lower leg: No edema.      Left lower leg: No " edema.   Skin:     General: Skin is warm and dry.   Neurological:      Mental Status: She is alert and oriented to person, place, and time. Mental status is at baseline.   Psychiatric:         Mood and Affect: Mood normal.                Medications in the Emergency Department:  Medications   ipratropium-albuterol (DUO-NEB) nebulizer solution 7.5 mL (0 mL Nebulization Stopped 8/18/22 1842)   aspirin chewable tablet 324 mg (324 mg Oral Given 8/18/22 1513)   methylPREDNISolone sodium succinate (SOLU-Medrol) injection 125 mg (125 mg Intravenous Given 8/18/22 1547)   ondansetron (ZOFRAN) injection 4 mg (4 mg Intravenous Given 8/18/22 1608)   Lidocaine Viscous HCl (XYLOCAINE) 2 % solution 10 mL (10 mL Mouth/Throat Given 8/18/22 1608)   famotidine (PEPCID) injection 20 mg (20 mg Intravenous Given 8/18/22 1608)   aluminum-magnesium hydroxide-simethicone (MAALOX MAX) 400-400-40 MG/5ML suspension 15 mL (15 mL Oral Given 8/18/22 1608)   HYDROcodone-acetaminophen (NORCO) 5-325 MG per tablet 1 tablet (1 tablet Oral Given 8/18/22 1828)        Labs  Lab Results (last 24 hours)     Procedure Component Value Units Date/Time    POC Troponin I with Hold Tube [[id number]] Collected: 08/18/22 1326    Specimen: Blood Updated: 08/18/22 1358    Narrative:      The following orders were created for panel order POC Troponin I with Hold Tube.  Procedure                               Abnormality         Status                     ---------                               -----------         ------                     POC Troponin I[[id number]]                                                              HOLD Troponin-I Tube[[id number]]                             Final result                 Please view results for these tests on the individual orders.    CBC & Differential [[id number]]  (Abnormal) Collected: 08/18/22 1326    Specimen: Blood Updated: 08/18/22 1339    Narrative:      The following orders were created for panel order CBC &  Differential.  Procedure                               Abnormality         Status                     ---------                               -----------         ------                     CBC Auto Differential[[id number]]        Abnormal            Final result                 Please view results for these tests on the individual orders.    Comprehensive Metabolic Panel [[id number]]  (Abnormal) Collected: 08/18/22 1326    Specimen: Blood Updated: 08/18/22 1357     Glucose 130 mg/dL      BUN 20 mg/dL      Creatinine 0.78 mg/dL      Sodium 140 mmol/L      Potassium 3.9 mmol/L      Chloride 104 mmol/L      CO2 23.0 mmol/L      Calcium 9.1 mg/dL      Total Protein 7.0 g/dL      Albumin 4.20 g/dL      ALT (SGPT) 51 U/L      AST (SGOT) 91 U/L      Alkaline Phosphatase 59 U/L      Total Bilirubin 0.3 mg/dL      Globulin 2.8 gm/dL      A/G Ratio 1.5 g/dL      BUN/Creatinine Ratio 25.6     Anion Gap 13.0 mmol/L      eGFR 90.4 mL/min/1.73      Comment: National Kidney Foundation and American Society of Nephrology (ASN) Task Force recommended calculation based on the Chronic Kidney Disease Epidemiology Collaboration (CKD-EPI) equation refit without adjustment for race.       Narrative:      GFR Normal >60  Chronic Kidney Disease <60  Kidney Failure <15      Lipase [[id number]]  (Normal) Collected: 08/18/22 1326    Specimen: Blood Updated: 08/18/22 1357     Lipase 45 U/L     BNP [[id number]]  (Normal) Collected: 08/18/22 1326    Specimen: Blood Updated: 08/18/22 1403     proBNP 421.7 pg/mL     Narrative:      Among patients with dyspnea, NT-proBNP is highly sensitive for the detection of acute congestive heart failure. In addition NT-proBNP of <300 pg/ml effectively rules out acute congestive heart failure with 99% negative predictive value.    Results may be falsely decreased if patient taking Biotin.      Magnesium [[id number]]  (Normal) Collected: 08/18/22 1326    Specimen: Blood Updated: 08/18/22 1357     Magnesium 2.0 mg/dL      CK Total & CKMB [[id number]]  (Normal) Collected: 08/18/22 1326    Specimen: Blood Updated: 08/18/22 1403     CKMB 1.36 ng/mL      Creatine Kinase 28 U/L     Narrative:      CKMB results may be falsely decreased if patient taking Biotin.    CBC Auto Differential [[id number]]  (Abnormal) Collected: 08/18/22 1326    Specimen: Blood Updated: 08/18/22 1339     WBC 8.13 10*3/mm3      RBC 4.31 10*6/mm3      Hemoglobin 11.3 g/dL      Hematocrit 35.4 %      MCV 82.1 fL      MCH 26.2 pg      MCHC 31.9 g/dL      RDW 14.8 %      RDW-SD 44.4 fl      MPV 12.0 fL      Platelets 260 10*3/mm3      Neutrophil % 73.1 %      Lymphocyte % 20.3 %      Monocyte % 3.2 %      Eosinophil % 0.9 %      Basophil % 0.9 %      Immature Grans % 1.6 %      Neutrophils, Absolute 5.95 10*3/mm3      Lymphocytes, Absolute 1.65 10*3/mm3      Monocytes, Absolute 0.26 10*3/mm3      Eosinophils, Absolute 0.07 10*3/mm3      Basophils, Absolute 0.07 10*3/mm3      Immature Grans, Absolute 0.13 10*3/mm3      nRBC 0.0 /100 WBC     D-dimer, Quantitative [[id number]]  (Normal) Collected: 08/18/22 1326    Specimen: Blood Updated: 08/18/22 1526     D-Dimer, Quantitative 0.32 MCGFEU/mL     Narrative:      The Stago D-Dimer test used in conjunction with a clinical pretest probability (PTP) assessment model, has been approved by the FDA to rule out the presence of venous thromboembolism (VTE) in outpatients suspected of deep venous thrombosis (DVT) or pulmonary embolism (PE). The cut-off for negative predictive value is <0.50 MCGFEU/mL.    POC Troponin I [[id number]]  (Normal) Collected: 08/18/22 1330    Specimen: Blood Updated: 08/18/22 1342     Troponin I 0.00 ng/mL      Comment: Serial Number: [id number]:  [id number]       POC Troponin I [[id number]]  (Normal) Collected: 08/18/22 1520    Specimen: Blood Updated: 08/18/22 1533     Troponin I 0.00 ng/mL      Comment: Serial Number: [id number]:  [id number]              Imaging:  XR Chest 1 View    Result Date:  8/18/2022  PROCEDURE: XR CHEST 1 VW  COMPARISON: [facility] JAMEL DALEY, CHEST PA/AP & LAT 2V, 9/23/2010, 14:39.  INDICATIONS: Chest Pain Triage Protocol/SHORTNESS OF BREATH/RECENT DX OF BLOOD CLOT IN LUNGS AT [facility]  FINDINGS:  Heart size appears at the upper limits of normal.  Mediastinal contour appears within normal limits.  Stable nodular densities in the right lung and right hilum, suggesting remote granulomatous disease.  No definite pleural effusion, pneumothorax, or pulmonary infiltrate is identified.        1. Heart size appears at the upper limits of normal. 2. No radiographic findings of acute pulmonary abnormality.       KENDAL RAJAN MD       Electronically Signed and Approved By: KENDAL RAJAN MD on 8/18/2022 at 14:30               Procedures:  Procedures    Progress  ED Course as of 08/18/22 2306   Thu Aug 18, 2022   1450 Patient states that she was seen at [facility] on the 16th and had a CTchest, no PE seen on read. Pt is concerned she still might have blood clot and states she doesn't trust [facility].  [AJ]   1452 --- PROVIDER IN TRIAGE NOTE ---    The patient was evaluated my Charmaine lobato in triage. Orders were placed and the patient is currently awaiting disposition. [AJ]      ED Course User Index  [AJ] Charmaine Quintanilla PA-C                            The patient was initially evaluated in the triage area where orders were placed. The patient was later dispositioned by Suki Sanabria MD.      Medical Decision Making:  MDM  Number of Diagnoses or Management Options  Chest pain, unspecified type  Shortness of breath  Diagnosis management comments: ddx: ACS, Costochondritis, pleurisy, PE, dissection, tamponade, anxiety    Patient's D-dimer here today was within normal limits with a recent CT PE being negative very low suspicion for PE.  Patient had serial troponin which was negative.  Vitals are stable.  EKG was normal sinus rhythm no sign of ST elevation or depression.  QTc 422  QRS 73.  Patient's heart score is 3.  Patient also had a cardiac cath in October of last year which showed normal coronary arteries.  Patient symptoms may be more related to her COPD.  Extremely low suspicion of ACS or PE.       Amount and/or Complexity of Data Reviewed  Clinical lab tests: reviewed  Tests in the radiology section of CPT®: reviewed  Review and summarize past medical records: yes  Independent visualization of images, tracings, or specimens: yes (10/2021- cardiac catheter performed revealed normal coronary arteries)         Final diagnoses:   Chest pain, unspecified type   Shortness of breath        Disposition:  ED Disposition     ED Disposition   Discharge    Condition   Stable    Comment   --             This medical record created using voice recognition software.           Cantu, Anna C  08/18/22 1546       Cantu, Anna C  08/18/22 7043       Suki Sanabria MD  08/18/22 2799

## 2022-08-18 NOTE — OUTREACH NOTE
Call Center TCM Note    Flowsheet Row Responses   [facility] [place], operated by [facility] patient discharged from? Non-   Does the patient have one of the following disease processes/diagnoses(primary or secondary)? Other   TCM attempt successful? Yes  [Justino Grady]   Call end time 0906   Discharge diagnosis Chest pain, wheezing, and COPD    Person spoke with today (if not patient) and relationship Patient   Meds reviewed with patient/caregiver? Yes   Is the patient having any side effects they believe may be caused by any medication additions or changes? No   Does the patient have all medications ordered at discharge? N/A   Is the patient taking all medications as directed (includes completed medication regime)? Yes   Does the patient have a primary care provider?  Yes   Does the patient have an appointment with their PCP within 7 days of discharge? Greater than 7 days   What is preventing the patient from scheduling follow up appointments within 7 days of discharge? Earlier appointment not available   Nursing Interventions Verified appointment date/time/provider   Has the patient kept scheduled appointments due by today? N/A   Psychosocial issues? No   Did the patient receive a copy of their discharge instructions? Yes   Nursing interventions Reviewed instructions with patient   What is the patient's perception of their health status since discharge? Improving   Is the patient/caregiver able to teach back signs and symptoms related to disease process for when to call PCP? Yes   Is the patient/caregiver able to teach back signs and symptoms related to disease process for when to call 911? Yes   Is the patient/caregiver able to teach back the hierarchy of who to call/visit for symptoms/problems? PCP, Specialist, Home health nurse, Urgent Care, ED, 911 Yes   If the patient is a current smoker, are they able to teach back resources for cessation? Not a smoker   TCM call completed? Yes   Wrap up additional comments Pt is having a hard time  with SOB, and woke up with a blood saturated top r/t epistaxis. Pt was very upset since she was dc'd from hospital still struggling with SOB, and reports nebulizer treatments are not helping. Pt advised to go to ED. Pt verbalized understanding.          Micki Joe RN    8/18/2022, 09:15 EDT

## 2022-08-18 NOTE — DISCHARGE INSTRUCTIONS
Follow-up with your primary care doctor.  Return to the emergency department if you have worsening chest pain or trouble breathing.

## 2022-08-19 ENCOUNTER — PATIENT OUTREACH (OUTPATIENT)
Dept: CASE MANAGEMENT | Facility: OTHER | Age: 57
End: 2022-08-19

## 2022-08-19 RX ORDER — PANTOPRAZOLE SODIUM 40 MG/1
40 TABLET, DELAYED RELEASE ORAL DAILY
Qty: 90 TABLET | Refills: 0 | Status: SHIPPED | OUTPATIENT
Start: 2022-08-19

## 2022-08-19 NOTE — TELEPHONE ENCOUNTER
Pt states ER doctor had sent in Pepcid yesterday and she doesn't want that, she wants refill on her pantoprazole. It works better.

## 2022-08-19 NOTE — OUTREACH NOTE
"AMBULATORY CASE MANAGEMENT NOTE    Name and Relationship of Patient/Support Person:  -     Called Travis to follow up on her ER visit.  She was discharged from [facility] on 8/17/22 for pleuritic chest pain.  She reports that she was told her d-dimer was elevated and had a PE , put on a blood thinner that she developed a bloody nose from and had a-fib. She persisted with chest pain/shortness of air, so she went to ER at [facility] on 8/18/22.  Travis describes the \"pain\" as tightness, she can't get a good breath.  She has a reported history COPD/bronchitis.  She was treated with steroids and nebulizer treatments.  Hopefully she will continue to improve over the weekend.  She has a follow up with cardiology on Tuesday next week.  Will follow up next week with phone call.     Education Documentation  No documentation found.        DARIN BECKWITH  Ambulatory Case Management    8/19/2022, 17:17 EDT  "

## 2022-08-23 ENCOUNTER — TELEPHONE (OUTPATIENT)
Dept: CARDIOLOGY | Facility: CLINIC | Age: 57
End: 2022-08-23

## 2022-08-24 LAB
QT INTERVAL: 381 MS
QT INTERVAL: 404 MS

## 2022-08-26 ENCOUNTER — PATIENT OUTREACH (OUTPATIENT)
Dept: CASE MANAGEMENT | Facility: OTHER | Age: 57
End: 2022-08-26

## 2022-08-26 DIAGNOSIS — R06.02 SHORTNESS OF BREATH: Primary | ICD-10-CM

## 2022-08-26 NOTE — OUTREACH NOTE
AMBULATORY CASE MANAGEMENT NOTE    Name and Relationship of Patient/Support Person: Bc Travis Kristal - Self    Following up from new referral from ER data, Travis did not keep either (NO SHOW) for both appointments for Cardiology and PCP.  She was notified and aware of both future appointments.  She did not answer the phone call to follow up and unable to leave message.  Will continue to follow.      DARIN BECKWITH  Ambulatory Case Management    8/26/2022, 11:32 EDT

## 2022-09-13 ENCOUNTER — TELEPHONE (OUTPATIENT)
Dept: FAMILY MEDICINE CLINIC | Age: 57
End: 2022-09-13

## 2022-09-13 NOTE — TELEPHONE ENCOUNTER
Caller: JENNY    Relationship to patient: Other    Best call back number: [phone]    Patient is needing: [facility] CALLING TO CONFIRM FAX WAS RECEIVED RECOMMENDING A MEDICATION FOR THE PATIENT AND IF THE MEDICATION IS GOING TO BE PRESCRIBED MEDICATION STATIN SINCE PATIENT IS DIABETIC PLEASE CONTACT AND ADVISE

## 2022-09-14 RX ORDER — METHOCARBAMOL 750 MG/1
750 TABLET, FILM COATED ORAL 2 TIMES DAILY PRN
Qty: 60 TABLET | Refills: 2 | Status: SHIPPED | OUTPATIENT
Start: 2022-09-14 | End: 2022-10-04 | Stop reason: SDUPTHER

## 2022-10-04 ENCOUNTER — PREP FOR SURGERY (OUTPATIENT)
Dept: OTHER | Facility: HOSPITAL | Age: 57
End: 2022-10-04

## 2022-10-04 DIAGNOSIS — E66.01 OBESITY, CLASS III, BMI 40-49.9 (MORBID OBESITY): Primary | ICD-10-CM

## 2022-10-04 RX ORDER — VILAZODONE HYDROCHLORIDE 40 MG/1
TABLET ORAL
COMMUNITY
Start: 2022-07-08

## 2022-10-04 RX ORDER — PROMETHAZINE HYDROCHLORIDE 25 MG/1
25 SUPPOSITORY RECTAL ONCE AS NEEDED
Status: CANCELLED | OUTPATIENT
Start: 2022-10-17

## 2022-10-04 RX ORDER — GABAPENTIN 250 MG/5ML
300 SOLUTION ORAL ONCE
Status: CANCELLED | OUTPATIENT
Start: 2022-10-17 | End: 2022-10-04

## 2022-10-04 RX ORDER — DILTIAZEM HYDROCHLORIDE 60 MG/1
2 TABLET, FILM COATED ORAL 2 TIMES DAILY
COMMUNITY
Start: 2022-08-29

## 2022-10-04 RX ORDER — SODIUM CHLORIDE 0.9 % (FLUSH) 0.9 %
3 SYRINGE (ML) INJECTION EVERY 12 HOURS SCHEDULED
Status: CANCELLED | OUTPATIENT
Start: 2022-10-04

## 2022-10-04 RX ORDER — SODIUM CHLORIDE, SODIUM LACTATE, POTASSIUM CHLORIDE, CALCIUM CHLORIDE 600; 310; 30; 20 MG/100ML; MG/100ML; MG/100ML; MG/100ML
100 INJECTION, SOLUTION INTRAVENOUS CONTINUOUS
Status: CANCELLED | OUTPATIENT
Start: 2022-10-17

## 2022-10-04 RX ORDER — DEXTROAMPHETAMINE SACCHARATE, AMPHETAMINE ASPARTATE, DEXTROAMPHETAMINE SULFATE AND AMPHETAMINE SULFATE 7.5; 7.5; 7.5; 7.5 MG/1; MG/1; MG/1; MG/1
TABLET ORAL
COMMUNITY
Start: 2022-09-13 | End: 2022-10-04 | Stop reason: SDUPTHER

## 2022-10-04 RX ORDER — PANTOPRAZOLE SODIUM 40 MG/10ML
40 INJECTION, POWDER, LYOPHILIZED, FOR SOLUTION INTRAVENOUS ONCE
Status: CANCELLED | OUTPATIENT
Start: 2022-10-17 | End: 2022-10-04

## 2022-10-04 RX ORDER — PROMETHAZINE HYDROCHLORIDE 12.5 MG/1
12.5 TABLET ORAL ONCE AS NEEDED
Status: CANCELLED | OUTPATIENT
Start: 2022-10-17

## 2022-10-04 RX ORDER — SODIUM CHLORIDE 0.9 % (FLUSH) 0.9 %
3-10 SYRINGE (ML) INJECTION AS NEEDED
Status: CANCELLED | OUTPATIENT
Start: 2022-10-17

## 2022-10-04 RX ORDER — LEVOCETIRIZINE DIHYDROCHLORIDE 5 MG/1
TABLET, FILM COATED ORAL
COMMUNITY
Start: 2022-09-17 | End: 2022-10-04 | Stop reason: SDUPTHER

## 2022-10-04 RX ORDER — METOCLOPRAMIDE HYDROCHLORIDE 5 MG/ML
10 INJECTION INTRAMUSCULAR; INTRAVENOUS ONCE
Status: CANCELLED | OUTPATIENT
Start: 2022-10-17 | End: 2022-10-04

## 2022-10-04 RX ORDER — CHLORHEXIDINE GLUCONATE 0.12 MG/ML
15 RINSE ORAL SEE ADMIN INSTRUCTIONS
Status: CANCELLED | OUTPATIENT
Start: 2022-10-17

## 2022-10-04 RX ORDER — SCOLOPAMINE TRANSDERMAL SYSTEM 1 MG/1
1 PATCH, EXTENDED RELEASE TRANSDERMAL CONTINUOUS
Status: CANCELLED | OUTPATIENT
Start: 2022-10-17 | End: 2022-10-20

## 2022-10-04 RX ORDER — METHOCARBAMOL 750 MG/1
TABLET, FILM COATED ORAL
COMMUNITY
Start: 2022-09-14

## 2022-10-04 RX ORDER — IPRATROPIUM BROMIDE AND ALBUTEROL SULFATE 2.5; .5 MG/3ML; MG/3ML
SOLUTION RESPIRATORY (INHALATION)
COMMUNITY
Start: 2022-08-17

## 2022-10-04 RX ORDER — CEFAZOLIN SODIUM IN 0.9 % NACL 3 G/100 ML
3 INTRAVENOUS SOLUTION, PIGGYBACK (ML) INTRAVENOUS
Status: CANCELLED | OUTPATIENT
Start: 2022-10-17

## 2022-10-10 ENCOUNTER — TELEPHONE (OUTPATIENT)
Dept: BARIATRICS/WEIGHT MGMT | Facility: CLINIC | Age: 57
End: 2022-10-10

## 2022-10-13 ENCOUNTER — APPOINTMENT (OUTPATIENT)
Dept: PREADMISSION TESTING | Facility: HOSPITAL | Age: 57
End: 2022-10-13

## 2023-01-04 ENCOUNTER — TELEPHONE (OUTPATIENT)
Dept: CASE MANAGEMENT | Facility: OTHER | Age: 58
End: 2023-01-04
Payer: MEDICARE

## 2023-01-04 NOTE — TELEPHONE ENCOUNTER
Travis is identified on report indicating that she is overdue for [facility] exam and high risk.  Phone call attempted to reach patient to schedule appointment, UTR.  Left voice message with daughter asking her to have mom call office to schedule appointment with PCP.

## 2023-04-03 DIAGNOSIS — E11.42 TYPE 2 DIABETES MELLITUS WITH DIABETIC POLYNEUROPATHY, WITHOUT LONG-TERM CURRENT USE OF INSULIN: ICD-10-CM

## 2023-04-03 RX ORDER — METFORMIN HYDROCHLORIDE 500 MG/1
1000 TABLET, EXTENDED RELEASE ORAL 2 TIMES DAILY WITH MEALS
Qty: 120 TABLET | Refills: 0 | Status: SHIPPED | OUTPATIENT
Start: 2023-04-03

## 2023-04-03 NOTE — TELEPHONE ENCOUNTER
Attempted to contact pt at number listed and was hung up on. Lmovm inf pts daughter(emergency contact) for pt to sched appt.

## 2023-05-24 ENCOUNTER — EXTERNAL PBMM DATA (OUTPATIENT)
Dept: PHARMACY | Facility: OTHER | Age: 58
End: 2023-05-24
Payer: MEDICARE

## 2024-02-29 ENCOUNTER — HOSPITAL ENCOUNTER (OUTPATIENT)
Dept: OTHER | Facility: HOSPITAL | Age: 59
Discharge: HOME | End: 2024-02-29

## 2024-03-07 ENCOUNTER — HOSPITAL ENCOUNTER (OUTPATIENT)
Dept: OTHER | Facility: HOSPITAL | Age: 59
Discharge: HOME | End: 2024-03-07

## 2024-04-05 NOTE — OUTREACH NOTE
Prep Survey    Flowsheet Row Responses   [facility] facility patient discharged from? Non-[facility]   Is LACE score < 7 ? Non-BH Discharge   Emergency Room discharge w/ pulse ox? No   Eligibility [facility]   Hospital [facility]    Date of Admission 08/16/22   Date of Discharge 08/17/22   Discharge diagnosis Chest pain, wheezing, and COPD    Does the patient have one of the following disease processes/diagnoses(primary or secondary)? Other   Prep survey completed? Yes          MARSHA BENAVIDES - Registered Nurse         SEVERE

## 2024-10-01 ENCOUNTER — OFFICE VISIT (OUTPATIENT)
Dept: FAMILY MEDICINE CLINIC | Age: 59
End: 2024-10-01
Payer: MEDICARE

## 2024-10-01 VITALS
HEIGHT: 65 IN | TEMPERATURE: 99.2 F | DIASTOLIC BLOOD PRESSURE: 64 MMHG | SYSTOLIC BLOOD PRESSURE: 119 MMHG | OXYGEN SATURATION: 98 % | WEIGHT: 215.2 LBS | BODY MASS INDEX: 35.85 KG/M2 | HEART RATE: 77 BPM

## 2024-10-01 DIAGNOSIS — M54.50 CHRONIC MIDLINE LOW BACK PAIN WITHOUT SCIATICA: ICD-10-CM

## 2024-10-01 DIAGNOSIS — I10 ESSENTIAL HYPERTENSION: ICD-10-CM

## 2024-10-01 DIAGNOSIS — E78.2 MIXED HYPERLIPIDEMIA: ICD-10-CM

## 2024-10-01 DIAGNOSIS — Z23 ENCOUNTER FOR IMMUNIZATION: ICD-10-CM

## 2024-10-01 DIAGNOSIS — F32.A CHRONIC DEPRESSION: ICD-10-CM

## 2024-10-01 DIAGNOSIS — J30.9 CHRONIC ALLERGIC RHINITIS: ICD-10-CM

## 2024-10-01 DIAGNOSIS — E11.42 TYPE 2 DIABETES MELLITUS WITH DIABETIC POLYNEUROPATHY, WITHOUT LONG-TERM CURRENT USE OF INSULIN: Primary | ICD-10-CM

## 2024-10-01 DIAGNOSIS — G89.29 CHRONIC MIDLINE LOW BACK PAIN WITHOUT SCIATICA: ICD-10-CM

## 2024-10-01 DIAGNOSIS — G25.81 RESTLESS LEGS SYNDROME: ICD-10-CM

## 2024-10-01 DIAGNOSIS — I50.32 CHRONIC DIASTOLIC (CONGESTIVE) HEART FAILURE: ICD-10-CM

## 2024-10-01 DIAGNOSIS — K21.9 GASTROESOPHAGEAL REFLUX DISEASE, UNSPECIFIED WHETHER ESOPHAGITIS PRESENT: ICD-10-CM

## 2024-10-01 PROCEDURE — 99214 OFFICE O/P EST MOD 30 MIN: CPT | Performed by: FAMILY MEDICINE

## 2024-10-01 PROCEDURE — 90480 ADMN SARSCOV2 VAC 1/ONLY CMP: CPT | Performed by: FAMILY MEDICINE

## 2024-10-01 PROCEDURE — 3074F SYST BP LT 130 MM HG: CPT | Performed by: FAMILY MEDICINE

## 2024-10-01 PROCEDURE — 3078F DIAST BP <80 MM HG: CPT | Performed by: FAMILY MEDICINE

## 2024-10-01 PROCEDURE — 91320 SARSCV2 VAC 30MCG TRS-SUC IM: CPT | Performed by: FAMILY MEDICINE

## 2024-10-01 RX ORDER — ALBUTEROL SULFATE 90 UG/1
2 INHALANT RESPIRATORY (INHALATION) EVERY 4 HOURS PRN
COMMUNITY

## 2024-10-01 RX ORDER — FUROSEMIDE 40 MG
40 TABLET ORAL DAILY PRN
Start: 2024-10-01

## 2024-10-01 RX ORDER — ONDANSETRON 4 MG/1
4 TABLET, FILM COATED ORAL EVERY 8 HOURS PRN
Qty: 30 TABLET | Refills: 2 | Status: SHIPPED | OUTPATIENT
Start: 2024-10-01

## 2024-10-01 RX ORDER — BLOOD SUGAR DIAGNOSTIC
STRIP MISCELLANEOUS
COMMUNITY
Start: 2024-06-10

## 2024-10-01 RX ORDER — PRAVASTATIN SODIUM 40 MG
40 TABLET ORAL DAILY
Qty: 90 TABLET | Refills: 3 | Status: SHIPPED | OUTPATIENT
Start: 2024-10-01

## 2024-10-01 RX ORDER — BUPROPION HYDROCHLORIDE 300 MG/1
1 TABLET ORAL DAILY
COMMUNITY
Start: 2024-07-30 | End: 2024-10-01 | Stop reason: SDUPTHER

## 2024-10-01 RX ORDER — IBUPROFEN 800 MG/1
800 TABLET, FILM COATED ORAL EVERY 8 HOURS PRN
COMMUNITY
Start: 2024-09-18 | End: 2024-10-01 | Stop reason: SDUPTHER

## 2024-10-01 RX ORDER — IBUPROFEN 800 MG/1
800 TABLET, FILM COATED ORAL EVERY 8 HOURS PRN
Qty: 100 TABLET | Refills: 1 | Status: SHIPPED | OUTPATIENT
Start: 2024-10-01

## 2024-10-01 RX ORDER — LIDOCAINE 50 MG/G
1 PATCH TOPICAL EVERY 24 HOURS
COMMUNITY

## 2024-10-01 RX ORDER — ROPINIROLE 0.25 MG/1
0.25 TABLET, FILM COATED ORAL
Qty: 90 TABLET | Refills: 3 | Status: SHIPPED | OUTPATIENT
Start: 2024-10-01

## 2024-10-01 RX ORDER — PRAVASTATIN SODIUM 40 MG
40 TABLET ORAL DAILY
COMMUNITY
Start: 2024-07-30 | End: 2024-10-01 | Stop reason: SDUPTHER

## 2024-10-01 RX ORDER — ROPINIROLE 0.25 MG/1
1 TABLET, FILM COATED ORAL DAILY
COMMUNITY
Start: 2024-07-15 | End: 2024-10-01 | Stop reason: SDUPTHER

## 2024-10-01 RX ORDER — LACTOBACILLUS RHAMNOSUS GG 10B CELL
1 CAPSULE ORAL DAILY
Qty: 90 CAPSULE | Refills: 3 | Status: SHIPPED | OUTPATIENT
Start: 2024-10-01

## 2024-10-01 RX ORDER — TIRZEPATIDE 7.5 MG/.5ML
7.5 INJECTION, SOLUTION SUBCUTANEOUS WEEKLY
COMMUNITY
Start: 2024-09-03 | End: 2024-10-04

## 2024-10-01 RX ORDER — METOPROLOL TARTRATE 50 MG
50 TABLET ORAL 2 TIMES DAILY
Qty: 90 TABLET | Refills: 3 | Status: SHIPPED | OUTPATIENT
Start: 2024-10-01

## 2024-10-01 RX ORDER — POTASSIUM CHLORIDE 750 MG/1
10 CAPSULE, EXTENDED RELEASE ORAL DAILY PRN
Start: 2024-10-01

## 2024-10-01 RX ORDER — ONDANSETRON 4 MG/1
4 TABLET, ORALLY DISINTEGRATING ORAL DAILY PRN
COMMUNITY
Start: 2024-09-17 | End: 2024-10-01

## 2024-10-01 RX ORDER — LIDOCAINE 4 G/G
1 PATCH TOPICAL EVERY 24 HOURS
COMMUNITY
Start: 2024-06-14 | End: 2024-10-01

## 2024-10-01 RX ORDER — PANTOPRAZOLE SODIUM 40 MG/1
40 TABLET, DELAYED RELEASE ORAL DAILY
Qty: 90 TABLET | Refills: 3 | Status: SHIPPED | OUTPATIENT
Start: 2024-10-01

## 2024-10-01 RX ORDER — BUPROPION HYDROCHLORIDE 300 MG/1
300 TABLET ORAL DAILY
Qty: 90 TABLET | Refills: 3 | Status: SHIPPED | OUTPATIENT
Start: 2024-10-01

## 2024-10-01 RX ORDER — LEVOCETIRIZINE DIHYDROCHLORIDE 5 MG/1
5 TABLET, FILM COATED ORAL DAILY
Qty: 90 TABLET | Refills: 3 | Status: SHIPPED | OUTPATIENT
Start: 2024-10-01

## 2024-10-01 RX ORDER — METFORMIN HCL 500 MG
1000 TABLET, EXTENDED RELEASE 24 HR ORAL
Qty: 180 TABLET | Refills: 3 | Status: SHIPPED | OUTPATIENT
Start: 2024-10-01

## 2024-10-01 RX ORDER — LACTOBACILLUS RHAMNOSUS GG 10B CELL
CAPSULE ORAL
COMMUNITY
Start: 2024-09-14 | End: 2024-10-01 | Stop reason: SDUPTHER

## 2024-10-01 RX ORDER — METOPROLOL TARTRATE 50 MG
1 TABLET ORAL 2 TIMES DAILY
COMMUNITY
Start: 2024-08-29 | End: 2024-10-01 | Stop reason: SDUPTHER

## 2024-10-01 RX ORDER — LANCETS 33 GAUGE
EACH MISCELLANEOUS
COMMUNITY
Start: 2024-06-10

## 2024-10-01 NOTE — PROGRESS NOTES
Travis Yancey presents to [facility].    Chief Complaint:  Diabetes, cholesterol, blood pressure, anxiety    Subjective   History of Present Illness:  Travis is being seen today for follow-up on her care.  She has type 2 diabetes for which she currently takes Mounjaro and metformin.  She says that she has lost about 45 pounds over the last 5 months.  Her current dose of Mounjaro 7.5 mg weekly.  She says her most recent A1c with her previous doctor was 5.1%.    She also has hypertension and elevated cholesterol.  She remains on treatment for these issues.  Her blood pressure is well-controlled today.    She also has depression for which she currently takes Wellbutrin.  It is of benefit for her.  She has been through a treatment program for drug and alcohol abuse.  She has done well with that, and she says that she is feeling better than she has felt for a long period of time.    Review of Systems:  Review of Systems   Constitutional:  Negative for chills and fever.   Respiratory:  Negative for cough and shortness of breath.    Cardiovascular:  Negative for chest pain and palpitations.   Gastrointestinal:  Negative for abdominal pain, nausea and vomiting.      Objective   Medical History:  Past Medical History:    ADHD (attention deficit hyperactivity disorder)    Anxiety    Arthritis    Congenital hip dysplasia    Depression    Fibromyalgia    Hypertension    Mixed hyperlipidemia    Type 2 diabetes mellitus     Past Surgical History:    APPENDECTOMY    CARDIAC CATHETERIZATION    Procedure: RIGHT AND LEFT HEART CATH;  Surgeon: Jennifer Wilcox MD;  Location: [facility] INVASIVE LOCATION;  Service: Cardiology;  Laterality: Left;  right and left cardiac catheterization    CARDIAC CATHETERIZATION    Procedure: Left ventriculography;  Surgeon: Jennifer Wilcox MD;  Location: [facility] INVASIVE LOCATION;  Service: Cardiology;  Laterality: N/A;    CARDIAC CATHETERIZATION    Procedure:  Coronary angiography;  Surgeon: Jennifer Wilcox MD;  Location:  [facility] INVASIVE LOCATION;  Service: Cardiology;  Laterality: N/A;    CERVICAL FUSION    c5-6     SECTION    CHOLECYSTECTOMY    DISCECTOMY POSTERIOR THORACIC TRANSPEDICULAR APPROACH    ENDOSCOPY    Procedure: ESOPHAGOGASTRODUODENOSCOPY WITH BIOPSY;  Surgeon: Jaxon Belle Jr., MD;  Location:  [facility];  Service: General;  Laterality: N/A;  PRE-GERD  POST- GASTRITIS    PARTIAL HIP ARTHROPLASTY      Family History   Problem Relation Age of Onset    Malig Hyperthermia Neg Hx      Social History     Tobacco Use    Smoking status: Former     Current packs/day: 0.00     Types: Cigarettes     Quit date: 2019     Years since quittin.1    Smokeless tobacco: Never    Tobacco comments:     Intermitt. starting age 22, 3 packs a wk   Substance Use Topics    Alcohol use: Not Currently       Health Maintenance Due   Topic Date Due    DIABETIC EYE EXAM  Never done    TDAP/TD VACCINES (1 - Tdap) Never done    ZOSTER VACCINE (2 of 2) 2021    LIPID PANEL  10/06/2022    ANNUAL WELLNESS VISIT  10/18/2022    Pneumococcal Vaccine 0-64 (2 of 2 - PCV) 10/26/2022    DIABETIC FOOT EXAM  10/26/2022    URINE MICROALBUMIN  2023    MAMMOGRAM  2023    INFLUENZA VACCINE  2024        Immunization History   Administered Date(s) Administered    COVID-19 (ALEX) 06/15/2021    COVID-19 (PFIZER) 12YRS+ (COMIRNATY) 10/01/2024    COVID-19 (PFIZER) Purple Cap Monovalent 2021    Flu Vaccine Split Quad 10/06/2021    INFLUENZA A MONOVALENT (H5N1), ADJUVANTED-2013 10/03/2023    Influenza, Unspecified 2020    Pneumococcal Polysaccharide (PPSV23) 10/26/2021    Shingrix 10/06/2021    flucelvax quad pfs =>4 YRS 2020       Allergies   Allergen Reactions    Erythromycin GI Intolerance        Medications:    Current Outpatient Medications:     albuterol sulfate  (90 Base) MCG/ACT inhaler, Inhale 2 puffs Every 4 (Four)  Hours As Needed for Wheezing., Disp: , Rfl:     buPROPion XL (WELLBUTRIN XL) 300 MG 24 hr tablet, Take 1 tablet by mouth Daily., Disp: 90 tablet, Rfl: 3    furosemide (LASIX) 40 MG tablet, Take 1 tablet by mouth Daily As Needed (swelling)., Disp: , Rfl:     ibuprofen (ADVIL,MOTRIN) 800 MG tablet, Take 1 tablet by mouth Every 8 (Eight) Hours As Needed for Moderate Pain., Disp: 100 tablet, Rfl: 1    Lactobacillus-Inulin ([facility]) capsule, Take 1 each by mouth Daily., Disp: 90 capsule, Rfl: 3    Lancets (OneTouch Delica Plus [id number]) misc, , Disp: , Rfl:     levocetirizine (XYZAL) 5 MG tablet, Take 1 tablet by mouth Daily., Disp: 90 tablet, Rfl: 3    lidocaine (LIDODERM) 5 %, Place 1 patch on the skin as directed by provider Daily. Remove & Discard patch within 12 hours or as directed by MD, Disp: , Rfl:     metFORMIN ER (GLUCOPHAGE-XR) 500 MG 24 hr tablet, Take 2 tablets by mouth Daily With Breakfast., Disp: 180 tablet, Rfl: 3    metoprolol tartrate (LOPRESSOR) 50 MG tablet, Take 1 tablet by mouth 2 (Two) Times a Day., Disp: 90 tablet, Rfl: 3    Mounjaro 7.5 MG/0.5ML solution pen-injector pen, Inject 0.5 mL under the skin into the appropriate area as directed 1 (One) Time Per Week., Disp: , Rfl:     OneTouch Verio test strip, , Disp: , Rfl:     pantoprazole (PROTONIX) 40 MG EC tablet, Take 1 tablet by mouth Daily., Disp: 90 tablet, Rfl: 3    potassium chloride (MICRO-K) 10 MEQ CR capsule, Take 1 capsule by mouth Daily As Needed (when taking furosemide)., Disp: , Rfl:     pravastatin (PRAVACHOL) 40 MG tablet, Take 1 tablet by mouth Daily., Disp: 90 tablet, Rfl: 3    rOPINIRole (REQUIP) 0.25 MG tablet, Take 1 tablet by mouth every night at bedtime., Disp: 90 tablet, Rfl: 3    ondansetron (ZOFRAN) 4 MG tablet, Take 1 tablet by mouth Every 8 (Eight) Hours As Needed for Nausea or Vomiting., Disp: 30 tablet, Rfl: 2    valACYclovir (VALTREX) 1000 MG tablet, Take 2 tablets by mouth 2 (Two) Times a Day.,  "Disp: 4 tablet, Rfl: 2    Vital Signs:   /64 (BP Location: Right arm, Patient Position: Sitting)   Pulse 77   Temp 99.2 °F (37.3 °C) (Oral)   Ht 165.1 cm (65\")   Wt 97.6 kg (215 lb 3.2 oz)   SpO2 98%   BMI 35.81 kg/m²       Physical Exam:  Physical Exam  Vitals and nursing note reviewed.   Constitutional:       General: She is not in acute distress.     Appearance: She is obese. She is not ill-appearing.   HENT:      Right Ear: Tympanic membrane and ear canal normal.      Left Ear: Tympanic membrane and ear canal normal.      Mouth/Throat:      Mouth: Mucous membranes are moist.      Comments: Pharynx appears normal  Eyes:      Extraocular Movements: Extraocular movements intact.      Pupils: Pupils are equal, round, and reactive to light.   Neck:      Thyroid: No thyromegaly.   Cardiovascular:      Rate and Rhythm: Normal rate and regular rhythm.      Pulses:           Dorsalis pedis pulses are 2+ on the right side and 2+ on the left side.      Heart sounds: No murmur heard.  Pulmonary:      Effort: Pulmonary effort is normal.      Breath sounds: Normal breath sounds.   Abdominal:      General: There is no distension.      Palpations: Abdomen is soft. There is no mass.      Tenderness: There is no abdominal tenderness.   Musculoskeletal:      Cervical back: Normal range of motion.   Feet:      Right foot:      Protective Sensation: 3 sites tested.  2 sites sensed.      Skin integrity: Skin integrity normal.      Left foot:      Protective Sensation: 3 sites tested.  2 sites sensed.      Skin integrity: Skin integrity normal.      Comments: Decrease sensation in the feet is present.  Skin:     Findings: No lesion or rash.   Neurological:      General: No focal deficit present.      Mental Status: She is oriented to person, place, and time.      Cranial Nerves: No cranial nerve deficit.   Psychiatric:         Mood and Affect: Mood normal.     Result Review   The following data was reviewed by Jeferson" Daniel Harmon MD on 10/01/2024.  Lab Results   Component Value Date    WBC 8.13 08/18/2022    HGB 11.3 (L) 08/18/2022    HCT 35.4 08/18/2022    MCV 82.1 08/18/2022     08/18/2022     Lab Results   Component Value Date    GLUCOSE 130 (H) 08/18/2022    BUN 20 08/18/2022    CREATININE 0.78 08/18/2022     08/18/2022    K 3.9 08/18/2022     08/18/2022    CO2 23.0 08/18/2022    CALCIUM 9.1 08/18/2022    PROTEINTOT 7.0 08/18/2022    ALBUMIN 4.20 08/18/2022    ALT 51 (H) 08/18/2022    AST 91 (H) 08/18/2022    ALKPHOS 59 08/18/2022    BILITOT 0.3 08/18/2022    EGFR 90.4 08/18/2022    GLOB 2.8 08/18/2022    AGRATIO 1.5 08/18/2022    BCR 25.6 (H) 08/18/2022    ANIONGAP 13.0 08/18/2022      Lab Results   Component Value Date    CHOL 130 08/25/2021    TRIG 311 (H) 08/25/2021    HDL 37 (L) 08/25/2021    LDL 46 08/25/2021     Lab Results   Component Value Date    TSH 1.420 02/17/2022     Lab Results   Component Value Date    HGBA1C 6.70 (H) 02/17/2022     Class 2 Severe Obesity (BMI >=35 and <=39.9). Obesity-related health conditions include the following: hypertension, diabetes mellitus, dyslipidemias, and GERD. Obesity is improving with treatment. BMI is is above average; BMI management plan is completed. We discussed portion control, increasing exercise, and pharmacologic options including Mounjaro .         Assessment and Plan:   Today, we have reviewed her care.  Travis has been away from the office for a couple of years and has had significant changes made to her medication regimen.  We have reviewed these in detail today and we will move ahead with refills of her medications.  We will recommend moving ahead with obtaining records for our review.  Otherwise, no short-term change is anticipated.  Tentative follow-up will be again in about 6 months, sooner if needed.    Diagnoses and all orders for this visit:    1. Type 2 diabetes mellitus with diabetic polyneuropathy, without long-term current use of  insulin (Primary)  -     metFORMIN ER (GLUCOPHAGE-XR) 500 MG 24 hr tablet; Take 2 tablets by mouth Daily With Breakfast.  Dispense: 180 tablet; Refill: 3    2. Essential hypertension  -     furosemide (LASIX) 40 MG tablet; Take 1 tablet by mouth Daily As Needed (swelling).  -     potassium chloride (MICRO-K) 10 MEQ CR capsule; Take 1 capsule by mouth Daily As Needed (when taking furosemide).  -     metoprolol tartrate (LOPRESSOR) 50 MG tablet; Take 1 tablet by mouth 2 (Two) Times a Day.  Dispense: 90 tablet; Refill: 3    3. Chronic diastolic (congestive) heart failure  -     furosemide (LASIX) 40 MG tablet; Take 1 tablet by mouth Daily As Needed (swelling).  -     potassium chloride (MICRO-K) 10 MEQ CR capsule; Take 1 capsule by mouth Daily As Needed (when taking furosemide).    4. Restless legs syndrome  -     rOPINIRole (REQUIP) 0.25 MG tablet; Take 1 tablet by mouth every night at bedtime.  Dispense: 90 tablet; Refill: 3    5. Gastroesophageal reflux disease, unspecified whether esophagitis present  -     pantoprazole (PROTONIX) 40 MG EC tablet; Take 1 tablet by mouth Daily.  Dispense: 90 tablet; Refill: 3    6. Mixed hyperlipidemia  -     pravastatin (PRAVACHOL) 40 MG tablet; Take 1 tablet by mouth Daily.  Dispense: 90 tablet; Refill: 3    7. Chronic midline low back pain without sciatica  -     ibuprofen (ADVIL,MOTRIN) 800 MG tablet; Take 1 tablet by mouth Every 8 (Eight) Hours As Needed for Moderate Pain.  Dispense: 100 tablet; Refill: 1    8. Chronic allergic rhinitis  -     levocetirizine (XYZAL) 5 MG tablet; Take 1 tablet by mouth Daily.  Dispense: 90 tablet; Refill: 3    9. Chronic depression  -     buPROPion XL (WELLBUTRIN XL) 300 MG 24 hr tablet; Take 1 tablet by mouth Daily.  Dispense: 90 tablet; Refill: 3    10. Encounter for immunization  -     COVID-19 (Pfizer) 12yrs+ (COMIRNATY)    Other orders  -     Lactobacillus-Inulin ([facility]) capsule; Take 1 each by mouth Daily.  Dispense:  90 capsule; Refill: 3  -     ondansetron (ZOFRAN) 4 MG tablet; Take 1 tablet by mouth Every 8 (Eight) Hours As Needed for Nausea or Vomiting.  Dispense: 30 tablet; Refill: 2    Follow Up  Return in about 6 months (around 4/1/2025) for Recheck, Medicare Wellness.  Patient was given instructions and counseling regarding her condition or for health maintenance advice. Please see specific information pulled into the AVS if appropriate.

## 2024-10-02 LAB
CREATININE: 1.05
HBA1C MFR BLD: 5.3 %
HBA1C MFR BLD: 6.1 %

## 2024-10-02 NOTE — PROGRESS NOTES
Please update our flowsheet with the following labs:  A1c 5.3%, 9/23/2024  A1c 6.1%, 5/16/2024  Creatinine 1.05, 6/17/2024    Thank

## 2024-10-04 ENCOUNTER — TELEPHONE (OUTPATIENT)
Dept: FAMILY MEDICINE CLINIC | Age: 59
End: 2024-10-04
Payer: MEDICARE

## 2024-10-04 DIAGNOSIS — E11.42 TYPE 2 DIABETES MELLITUS WITH DIABETIC POLYNEUROPATHY, WITHOUT LONG-TERM CURRENT USE OF INSULIN: Primary | ICD-10-CM

## 2024-10-04 NOTE — TELEPHONE ENCOUNTER
Caller: Travis Yancey    Relationship: Self    Best call back number: [phone]     What medication are you requesting:     Mounjaro     If a prescription is needed, what is your preferred pharmacy and phone number: [facility] [id number] - [place], [place] - [address] [facility] - [phone]  - [phone] FX     Additional notes:   PATIENT IS DOWN TO LAST INJECTION AT CURRENT DOSE. WOULD LIKE TO BE ADVISED IF INCREASE OF MEDICATION CAN BE DONE AND PRESCRIBED.

## 2024-10-05 NOTE — TELEPHONE ENCOUNTER
I have sent the 10 mg dose of Mounjaro for her to [facility].  Please TICKLE to call her in 4 weeks to see if we may increase to 12.5 mg weekly.  Thanks.

## 2024-10-19 DIAGNOSIS — I10 ESSENTIAL HYPERTENSION: ICD-10-CM

## 2024-10-19 DIAGNOSIS — E11.42 TYPE 2 DIABETES MELLITUS WITH DIABETIC POLYNEUROPATHY, WITHOUT LONG-TERM CURRENT USE OF INSULIN: Primary | ICD-10-CM

## 2024-10-19 DIAGNOSIS — Z79.899 OTHER LONG TERM (CURRENT) DRUG THERAPY: ICD-10-CM

## 2024-10-19 DIAGNOSIS — E78.2 MIXED HYPERLIPIDEMIA: ICD-10-CM

## 2024-10-19 NOTE — PROGRESS NOTES
Please let Travis know that I have reviewed her chart further and placed orders for fasting blood work to be done at her convenience.  Her A1c was in a good range recently, but there are a few other labs that I want to follow-up on.  Let me know if she has other immediate concerns.  Thanks.

## 2024-10-22 ENCOUNTER — CLINICAL SUPPORT (OUTPATIENT)
Dept: FAMILY MEDICINE CLINIC | Age: 59
End: 2024-10-22
Payer: MEDICARE

## 2024-10-22 ENCOUNTER — TELEPHONE (OUTPATIENT)
Dept: FAMILY MEDICINE CLINIC | Age: 59
End: 2024-10-22
Payer: MEDICARE

## 2024-10-22 ENCOUNTER — LAB (OUTPATIENT)
Dept: LAB | Facility: HOSPITAL | Age: 59
End: 2024-10-22
Payer: MEDICARE

## 2024-10-22 DIAGNOSIS — Z79.899 OTHER LONG TERM (CURRENT) DRUG THERAPY: ICD-10-CM

## 2024-10-22 DIAGNOSIS — E11.42 TYPE 2 DIABETES MELLITUS WITH DIABETIC POLYNEUROPATHY, WITHOUT LONG-TERM CURRENT USE OF INSULIN: ICD-10-CM

## 2024-10-22 DIAGNOSIS — I10 ESSENTIAL HYPERTENSION: ICD-10-CM

## 2024-10-22 DIAGNOSIS — Z23 IMMUNIZATION DUE: Primary | ICD-10-CM

## 2024-10-22 DIAGNOSIS — E78.2 MIXED HYPERLIPIDEMIA: ICD-10-CM

## 2024-10-22 LAB
ALBUMIN SERPL-MCNC: 4.7 G/DL (ref 3.5–5.2)
ALBUMIN UR-MCNC: 19.2 MG/DL
ALBUMIN/GLOB SERPL: 1.5 G/DL
ALP SERPL-CCNC: 74 U/L (ref 39–117)
ALT SERPL W P-5'-P-CCNC: 15 U/L (ref 1–33)
ANION GAP SERPL CALCULATED.3IONS-SCNC: 11.6 MMOL/L (ref 5–15)
AST SERPL-CCNC: 20 U/L (ref 1–32)
BILIRUB SERPL-MCNC: 0.3 MG/DL (ref 0–1.2)
BUN SERPL-MCNC: 14 MG/DL (ref 6–20)
BUN/CREAT SERPL: 13.7 (ref 7–25)
CALCIUM SPEC-SCNC: 9.8 MG/DL (ref 8.6–10.5)
CHLORIDE SERPL-SCNC: 102 MMOL/L (ref 98–107)
CHOLEST SERPL-MCNC: 110 MG/DL (ref 0–200)
CO2 SERPL-SCNC: 24.4 MMOL/L (ref 22–29)
CREAT SERPL-MCNC: 1.02 MG/DL (ref 0.57–1)
DEPRECATED RDW RBC AUTO: 46.5 FL (ref 37–54)
EGFRCR SERPLBLD CKD-EPI 2021: 64.7 ML/MIN/1.73
ERYTHROCYTE [DISTWIDTH] IN BLOOD BY AUTOMATED COUNT: 15.6 % (ref 12.3–15.4)
FOLATE SERPL-MCNC: 5.77 NG/ML (ref 4.78–24.2)
GLOBULIN UR ELPH-MCNC: 3.2 GM/DL
GLUCOSE SERPL-MCNC: 89 MG/DL (ref 65–99)
HCT VFR BLD AUTO: 37.4 % (ref 34–46.6)
HDLC SERPL-MCNC: 37 MG/DL (ref 40–60)
HGB BLD-MCNC: 11.7 G/DL (ref 12–15.9)
LDLC SERPL CALC-MCNC: 46 MG/DL (ref 0–100)
LDLC/HDLC SERPL: 1.12 {RATIO}
MCH RBC QN AUTO: 25.3 PG (ref 26.6–33)
MCHC RBC AUTO-ENTMCNC: 31.3 G/DL (ref 31.5–35.7)
MCV RBC AUTO: 80.8 FL (ref 79–97)
PLATELET # BLD AUTO: 291 10*3/MM3 (ref 140–450)
PMV BLD AUTO: 11.9 FL (ref 6–12)
POTASSIUM SERPL-SCNC: 4 MMOL/L (ref 3.5–5.2)
PROT SERPL-MCNC: 7.9 G/DL (ref 6–8.5)
RBC # BLD AUTO: 4.63 10*6/MM3 (ref 3.77–5.28)
SODIUM SERPL-SCNC: 138 MMOL/L (ref 136–145)
TRIGL SERPL-MCNC: 157 MG/DL (ref 0–150)
TSH SERPL DL<=0.05 MIU/L-ACNC: 1.67 UIU/ML (ref 0.27–4.2)
VIT B12 BLD-MCNC: 346 PG/ML (ref 211–946)
VLDLC SERPL-MCNC: 27 MG/DL (ref 5–40)
WBC NRBC COR # BLD AUTO: 7.69 10*3/MM3 (ref 3.4–10.8)

## 2024-10-22 PROCEDURE — 80061 LIPID PANEL: CPT

## 2024-10-22 PROCEDURE — 82607 VITAMIN B-12: CPT

## 2024-10-22 PROCEDURE — 85027 COMPLETE CBC AUTOMATED: CPT

## 2024-10-22 PROCEDURE — 84443 ASSAY THYROID STIM HORMONE: CPT

## 2024-10-22 PROCEDURE — 36415 COLL VENOUS BLD VENIPUNCTURE: CPT

## 2024-10-22 PROCEDURE — 82746 ASSAY OF FOLIC ACID SERUM: CPT

## 2024-10-22 PROCEDURE — 82043 UR ALBUMIN QUANTITATIVE: CPT

## 2024-10-22 PROCEDURE — 90656 IIV3 VACC NO PRSV 0.5 ML IM: CPT | Performed by: FAMILY MEDICINE

## 2024-10-22 PROCEDURE — 80053 COMPREHEN METABOLIC PANEL: CPT

## 2024-10-22 PROCEDURE — G0008 ADMIN INFLUENZA VIRUS VAC: HCPCS | Performed by: FAMILY MEDICINE

## 2024-10-22 RX ORDER — AMOXICILLIN 500 MG/1
500 CAPSULE ORAL 3 TIMES DAILY
Qty: 21 CAPSULE | Refills: 0 | Status: SHIPPED | OUTPATIENT
Start: 2024-10-22

## 2024-10-22 NOTE — TELEPHONE ENCOUNTER
I have sent a 1 week supply of amoxicillin to [facility].  If she develops fever or worsening symptoms, she may need to be seen.  Thanks.

## 2024-10-22 NOTE — TELEPHONE ENCOUNTER
Pt states she had all her teeth removed by an oral surgeon, however they left a root tip that she was told would work itself out however it has not and she is afraid it is now infected, states her left upper gum is swollen and very sore, states she can taste infection, has an appointment with a new dentist here in [place] on Monday 10/28/24, wondering if you could send in abx until she can see them

## 2024-11-02 DIAGNOSIS — E11.42 TYPE 2 DIABETES MELLITUS WITH DIABETIC POLYNEUROPATHY, WITHOUT LONG-TERM CURRENT USE OF INSULIN: ICD-10-CM

## 2024-11-04 RX ORDER — TIRZEPATIDE 10 MG/.5ML
10 INJECTION, SOLUTION SUBCUTANEOUS WEEKLY
Qty: 2 ML | Refills: 0 | Status: SHIPPED | OUTPATIENT
Start: 2024-11-04 | End: 2024-11-04

## 2024-11-04 RX ORDER — ONDANSETRON 4 MG/1
TABLET, FILM COATED ORAL
Qty: 30 TABLET | Refills: 2 | Status: SHIPPED | OUTPATIENT
Start: 2024-11-04

## 2024-11-04 NOTE — TELEPHONE ENCOUNTER
Noted.  I have just sent the 12.5 mg dose of Mounjaro for her.  Please TICKLE to call her in 4 weeks to see if we may increase to 15 mg weekly.

## 2024-11-04 NOTE — TELEPHONE ENCOUNTER
I sent the 10 mg dose just now.  However, double check on this.  It looks like she may have gotten the 10 mg dose in October.  If so, she would be due for the 12.5 mg dose.  If that is correct, then please queue the 12.5 mg dose.  Thanks.

## 2024-11-05 ENCOUNTER — TELEPHONE (OUTPATIENT)
Dept: FAMILY MEDICINE CLINIC | Age: 59
End: 2024-11-05
Payer: MEDICARE

## 2024-11-05 NOTE — TELEPHONE ENCOUNTER
Caller: Travis Yancey    Relationship to patient: Self    Best call back number: [phone]    Patient is needing: PATIENT IS CALLING TO MAKE SURE THAT ALL HER REFILLS FOR MEDICATIONS THAT CAN BE FOR 90 DAY SUPPLY BE SENT FOR THAT SO PATIENT DOES NOT HAVE TO MAKE SEVERAL TRIPS PER MONTH TO THE PHARMACY WHERE THE LINES ARE ALWAYS LONG AND THE COST IS LOWER  THROUGH INSURANCE   PLEASE CONTACT IF THERE ARE ANY QUESTIONS

## 2024-11-11 ENCOUNTER — TELEPHONE (OUTPATIENT)
Dept: FAMILY MEDICINE CLINIC | Age: 59
End: 2024-11-11
Payer: MEDICARE

## 2024-11-11 DIAGNOSIS — E11.42 TYPE 2 DIABETES MELLITUS WITH DIABETIC POLYNEUROPATHY, WITHOUT LONG-TERM CURRENT USE OF INSULIN: Primary | ICD-10-CM

## 2024-11-11 NOTE — TELEPHONE ENCOUNTER
Caller: Travis Yancey    Relationship: Self    Best call back number: [phone]     What medication are you requesting: INCREASE ZOFRAN TO 8 MG     What are your current symptoms: NAUSEA      If a prescription is needed, what is your preferred pharmacy and phone number: [facility] [id number] - [place], [place] - [address] [facility] - [phone] [facility] [phone] FX     Additional notes: NAUSEA HAS INCREASED AND PATIENT WOULD LIKE TO HAVE A STRONGER DOSE OF ZOFRAN.

## 2024-11-12 RX ORDER — ONDANSETRON 4 MG/1
8 TABLET, FILM COATED ORAL EVERY 8 HOURS PRN
Qty: 30 TABLET | Refills: 2 | Status: SHIPPED | OUTPATIENT
Start: 2024-11-12

## 2024-11-12 NOTE — TELEPHONE ENCOUNTER
Please let Travis know that I have sent a higher dose of generic Zofran to [facility] just now for her.  However, if she is having persistent nausea which does not seem to be improving with her current dose of Mounjaro, then I would recommend we lower the dose back to 10 mg weekly.  I do not want her to feel sick all the time.  Let me know her thoughts.  Thanks.

## 2024-11-13 ENCOUNTER — TELEPHONE (OUTPATIENT)
Dept: FAMILY MEDICINE CLINIC | Age: 59
End: 2024-11-13
Payer: MEDICARE

## 2024-11-13 NOTE — TELEPHONE ENCOUNTER
Caller: Travis Yancey    Relationship: Self    Best call back number: [phone]     What is the best time to reach you: ANYTIME     Who are you requesting to speak with (clinical staff, provider,  specific staff member): CLINICAL       What was the call regarding: PATIENT IS CALLING REQUESTING TO SPEAK WITH YADIEL IN REGARDS TO STILL HAVING NASUEA FOLLOWING TAKING INCREASES DOSAGE OF ondansetron (ZOFRAN) 4 MG tablet , PATIENT WOULD LIKE TO DISCUSS WITH THE NURSE PRIOR TO ANY CHANGES BEGIN MADE.

## 2024-11-13 NOTE — TELEPHONE ENCOUNTER
Pt states she would like to go back down to the 10mg of Mounjaro due to nausea experiencing. She is requesting 3 month supply be sent in. Please advise.

## 2024-11-14 DIAGNOSIS — E11.42 TYPE 2 DIABETES MELLITUS WITH DIABETIC POLYNEUROPATHY, WITHOUT LONG-TERM CURRENT USE OF INSULIN: ICD-10-CM

## 2024-11-14 RX ORDER — TIRZEPATIDE 10 MG/.5ML
10 INJECTION, SOLUTION SUBCUTANEOUS WEEKLY
Qty: 6 ML | Refills: 3 | Status: SHIPPED | OUTPATIENT
Start: 2024-11-14

## 2024-11-14 RX ORDER — TIRZEPATIDE 10 MG/.5ML
10 INJECTION, SOLUTION SUBCUTANEOUS WEEKLY
Qty: 6 ML | Refills: 3 | Status: CANCELLED | OUTPATIENT
Start: 2024-11-14

## 2024-11-14 NOTE — TELEPHONE ENCOUNTER
I have sent this for her.  Hopefully, she will do better with the 10 mg dose.  If her symptoms do not improve, she should reach back out though for additional guidance.  Thanks.

## 2024-11-14 NOTE — TELEPHONE ENCOUNTER
Caller: Bc Travisselena Giraldo    Relationship: Self    Best call back number: [phone]    Requested Prescriptions:   Requested Prescriptions     Pending Prescriptions Disp Refills    Tirzepatide (Mounjaro) 10 MG/0.5ML solution auto-injector 6 mL 3     Sig: Inject 10 mg under the skin into the appropriate area as directed 1 (One) Time Per Week.        Pharmacy where request should be sent: [facility] [id number] [place], [place] - [address] [facility] - [phone] [facility] [phone] FX     Last office visit with prescribing clinician: 10/1/2024   Last telemedicine visit with prescribing clinician: Visit date not found   Next office visit with prescribing clinician: 4/15/2025     Additional details provided by patient: DUE TO THE CHANGE IN DOSAGE AND THE SUPPLY AMOUNT  INSURANCE IS REQUIRING A NEW PRIOR AUTHORIZATION, PHARMACY HAS THE PRESCRIPTION   HER NEXT INJECTION IS DUE TOMORROW   PLEASE CONTACT PATIENT TO MAKE SURE THAT THIS MESSAGE IS CORRECT    Does the patient have less than a 3 day supply:  [x] Yes  [] No    Would you like a call back once the refill request has been completed: [] Yes [x] No    If the office needs to give you a call back, can they leave a voicemail: [] Yes [x] No    Molly Kern Rep   11/14/24 12:02 EST

## 2024-11-15 NOTE — TELEPHONE ENCOUNTER
Caller: Travis Yancey    Relationship to patient: Self    Best call back number: [phone]     Patient is needing: CALLER WOULD LIKE A CALL BACK TO DISCUSS STATUS OF PRIOR AUTHORIZATION OF MEDICATION.    HER INJECTION IS DUE TODAY.

## 2024-11-15 NOTE — TELEPHONE ENCOUNTER
Submitted PA yesterday and was told that it was not required. Called [facility] (spoke to Cris) - she was able to override it and it went through. Rx will be delivered Monday, pt said that is fine because she found an extra 10mg in her fridge from previous.

## 2024-11-25 RX ORDER — ONDANSETRON 4 MG/1
TABLET, FILM COATED ORAL
Qty: 60 TABLET | OUTPATIENT
Start: 2024-11-25

## 2024-11-26 RX ORDER — ONDANSETRON 4 MG/1
8 TABLET, FILM COATED ORAL EVERY 8 HOURS PRN
Qty: 30 TABLET | Refills: 2 | Status: SHIPPED | OUTPATIENT
Start: 2024-11-26

## 2024-11-26 NOTE — TELEPHONE ENCOUNTER
Caller: Bc Travis Kristal    Relationship: Self    Best call back number: [phone]    Requested Prescriptions:   Requested Prescriptions     Pending Prescriptions Disp Refills    ondansetron (ZOFRAN) 4 MG tablet 30 tablet 2     Sig: Take 2 tablets by mouth Every 8 (Eight) Hours As Needed for Nausea or Vomiting.        Pharmacy where request should be sent: [facility] [id number] [place], [place] - [address] [facility] - [phone]  - [phone] FX     Last office visit with prescribing clinician: 10/1/2024   Last telemedicine visit with prescribing clinician: Visit date not found   Next office visit with prescribing clinician: 4/15/2025     Additional details provided by patient: PATIENT IS NEEDING REFILL. SHE WAS INFORMED THAT IT WAS DENIED BUT NOT SURE WHY.     Does the patient have less than a 3 day supply:  [] Yes  [] No    Would you like a call back once the refill request has been completed: [x] Yes [] No    If the office needs to give you a call back, can they leave a voicemail: [x] Yes [] No    Molly Abdi Rep   11/26/24 08:33 EST

## 2024-11-30 DIAGNOSIS — E11.42 TYPE 2 DIABETES MELLITUS WITH DIABETIC POLYNEUROPATHY, WITHOUT LONG-TERM CURRENT USE OF INSULIN: ICD-10-CM

## 2024-12-02 RX ORDER — TIRZEPATIDE 12.5 MG/.5ML
INJECTION, SOLUTION SUBCUTANEOUS
Qty: 2 ML | Refills: 0 | OUTPATIENT
Start: 2024-12-02

## 2024-12-11 ENCOUNTER — TELEPHONE (OUTPATIENT)
Dept: FAMILY MEDICINE CLINIC | Age: 59
End: 2024-12-11
Payer: MEDICARE

## 2024-12-11 DIAGNOSIS — G89.29 CHRONIC MIDLINE LOW BACK PAIN WITHOUT SCIATICA: ICD-10-CM

## 2024-12-11 DIAGNOSIS — M54.50 CHRONIC MIDLINE LOW BACK PAIN WITHOUT SCIATICA: ICD-10-CM

## 2024-12-11 RX ORDER — ONDANSETRON 4 MG/1
TABLET, FILM COATED ORAL
Qty: 30 TABLET | Refills: 2 | OUTPATIENT
Start: 2024-12-11

## 2024-12-11 RX ORDER — IBUPROFEN 800 MG/1
800 TABLET, FILM COATED ORAL EVERY 8 HOURS PRN
Qty: 100 TABLET | Refills: 1 | Status: SHIPPED | OUTPATIENT
Start: 2024-12-11

## 2024-12-11 NOTE — TELEPHONE ENCOUNTER
Pt states she believes the hair loss is due to weight loss and nutritional intake related to Mounjaro use. She has purchased a multi vitamin w/iron and a hair/skin/nail vitamin. She is starting to level out on weight loss. She would like to know which vitamin she should take to help with the hair loss and if you can prescribe minoxidil for her to use. Please advise.

## 2024-12-11 NOTE — TELEPHONE ENCOUNTER
Caller: Travis Yancey    Relationship: Self    Best call back number: [phone]    Which medication are you concerned about: CHELA    Who prescribed you this medication: DR. LOWRY       What are your concerns: HAIR LOSS POSSIBLY DUE TO SIDE EFFECT OF MEDICATION OR THE WEIGHT LOSS     How long have you had these concerns: PAST COUPLE OF WEEKS     PATIENT WANTS TO SPEAK WITH CLINICAL TEAM TO GIVE DETAILS

## 2024-12-11 NOTE — TELEPHONE ENCOUNTER
The vitamins that she is currently taking sound reasonable.  More than likely, she has experienced a telogen effluvium which is hair loss related to weight loss and other physical stress.  I would not recommend a prescription medication at this point.  As her weight levels out, she will likely see improvement with this.  Thanks.

## 2024-12-17 ENCOUNTER — TELEPHONE (OUTPATIENT)
Dept: FAMILY MEDICINE CLINIC | Age: 59
End: 2024-12-17
Payer: MEDICARE

## 2024-12-17 RX ORDER — ONDANSETRON 4 MG/1
TABLET, FILM COATED ORAL
Qty: 30 TABLET | Refills: 2 | OUTPATIENT
Start: 2024-12-17

## 2024-12-17 RX ORDER — ONDANSETRON 4 MG/1
8 TABLET, FILM COATED ORAL EVERY 8 HOURS PRN
Qty: 30 TABLET | Refills: 2 | Status: SHIPPED | OUTPATIENT
Start: 2024-12-17

## 2024-12-17 NOTE — TELEPHONE ENCOUNTER
I sent a prescription for this earlier.  If she is continuing to have significant nausea that may be related to from Mounjaro, then we should consider decreasing the dose of it or possibly stopping it.  I believe we dropped the dose from 12.5 mg to 10 mg last month.  Please reach out to her regarding this and see what her thoughts are.  We could drop back to the 7.5 mg dose.  Thanks.

## 2024-12-17 NOTE — TELEPHONE ENCOUNTER
Patient is calling again regard status of medication hub did advise of pending medication, and possible time frames and turn around times of encounters.

## 2024-12-17 NOTE — TELEPHONE ENCOUNTER
Caller: Travis Yancey    Relationship: Self    Best call back number: [phone]    What is the best time to reach you: ANYTIME    Who are you requesting to speak with (clinical staff, provider,  specific staff member): NURSE     What was the call regarding: PATIENT WOULD LIKE A CALL BACK REGARDING HER ZOFRAN

## 2024-12-18 ENCOUNTER — TELEPHONE (OUTPATIENT)
Dept: FAMILY MEDICINE CLINIC | Age: 59
End: 2024-12-18
Payer: MEDICARE

## 2024-12-18 NOTE — TELEPHONE ENCOUNTER
Caller: Travis Yancey    Relationship: Self    Best call back number: [phone]     What is the best time to reach you: ANYTIME    Who are you requesting to speak with (clinical staff, provider,  specific staff member): ELINOR COTTO    What was the call regarding: HAIR LOSS    Is it okay if the provider responds through MyChart: NO

## 2024-12-18 NOTE — TELEPHONE ENCOUNTER
Pt states she is not having as much nausea now, things are leveling out. Pt doesn't want to discontinue med. She is doing well at current dose. She wanted to remind you that she was taking the nausea medication prior to starting the mounjaro, as the metformin causes nausea also.

## 2025-01-13 ENCOUNTER — TELEPHONE (OUTPATIENT)
Dept: FAMILY MEDICINE CLINIC | Age: 60
End: 2025-01-13
Payer: MEDICARE

## 2025-01-13 RX ORDER — ONDANSETRON 4 MG/1
TABLET, FILM COATED ORAL
Qty: 30 TABLET | Refills: 2 | Status: SHIPPED | OUTPATIENT
Start: 2025-01-13

## 2025-01-13 NOTE — TELEPHONE ENCOUNTER
Caller: Travis Yancey    Relationship: Self    Best call back number:   Telephone Information:   Mobile [phone]         What is the best time to reach you: ANYTIME     Who are you requesting to speak with (clinical staff, provider,  specific staff member): YADIEL          What was the call regarding:        THE PATIENT IS REQUESTING A CALL TO DISCUSS THE MEDICATION REQUEST THAT WAS SENT BY THE PHARMACY.        ondansetron (ZOFRAN) 4 MG tablet

## 2025-01-21 ENCOUNTER — TELEPHONE (OUTPATIENT)
Dept: FAMILY MEDICINE CLINIC | Age: 60
End: 2025-01-21
Payer: MEDICARE

## 2025-01-21 DIAGNOSIS — A60.00 GENITAL HERPES SIMPLEX, UNSPECIFIED SITE: Primary | ICD-10-CM

## 2025-01-21 RX ORDER — VALACYCLOVIR HYDROCHLORIDE 500 MG/1
500 TABLET, FILM COATED ORAL 2 TIMES DAILY
Qty: 6 TABLET | Refills: 2 | Status: SHIPPED | OUTPATIENT
Start: 2025-01-21

## 2025-01-21 NOTE — TELEPHONE ENCOUNTER
Oral Valtrex has been sent to [facility].  I have reviewed dosing instructions and sent the correct dose for this.  It is stronger than topical treatment, and topical treatment would not be recommended in addition.  It is primarily used for the treatment of cold sores.  Thanks.

## 2025-01-21 NOTE — TELEPHONE ENCOUNTER
Caller: Travis Yancey    Relationship to patient: Self    Best call back number: [phone]    Patient is needing: PATIENT CALLED IN AND SAID THAT SHE IS HAVING AN OUTBREAK AND WOULD LIKE A REFILL OF ACYCLOVIR OR VALACYCLOVIR AND IS REQUESTING A CALL FROM NURSE YADIEL IF POSSIBLE PLEASE.      [facility] [id number] [facility] [place] [place] - [address] [facility] - [phone] [facility] [phone]  [phone]

## 2025-01-21 NOTE — TELEPHONE ENCOUNTER
Caller: Travis Yancey    Relationship: Self    Best call back number: [phone]     What medication are you requesting: PATIENT WANTS ORAL AND TOPICAL OF THE VALTREX MEDICATION       Have you had these symptoms before:    [x] Yes  [] No    Have you been treated for these symptoms before:   [x] Yes  [] No    If a prescription is needed, what is your preferred pharmacy and phone number: [facility] [id number] - [place], [place] - [address] [facility] [phone] [facility] [phone] FX     Additional notes:  LOCATED IN PRIVATE AREA / RECTAL CAUSING ITCHING

## 2025-02-03 RX ORDER — ONDANSETRON 4 MG/1
8 TABLET, FILM COATED ORAL EVERY 8 HOURS PRN
Qty: 30 TABLET | Refills: 2 | Status: SHIPPED | OUTPATIENT
Start: 2025-02-03

## 2025-02-05 ENCOUNTER — TELEPHONE (OUTPATIENT)
Dept: FAMILY MEDICINE CLINIC | Age: 60
End: 2025-02-05
Payer: MEDICARE

## 2025-02-05 NOTE — TELEPHONE ENCOUNTER
Caller: Travis Yancey    Relationship to patient: Self    Best call back number: [phone]    Patient is needing: PATIENT CALLED REQUESTING TO SPEAK WITH YADIEL. PATIENT STATES SHE HAS SOME QUESTIONS REGARDING A HAIR LOSS SHAMPOO SHE WAS LOOKING INTO TO BUYING AND WOULD LIKE TO RUN THIS BY PCP FIRST BEFORE PURCHASING.

## 2025-02-05 NOTE — TELEPHONE ENCOUNTER
Pt states she purchased Minoxidil shampoo 5%. She would like to know if she can use this safely. Please advise.

## 2025-02-06 NOTE — TELEPHONE ENCOUNTER
The risk of using topical minoxidil is very low.  There is some concern with the oral version of the medication in patients with a history of diastolic heart failure.  However, she should be able to safely use it.  Thanks.

## 2025-02-24 DIAGNOSIS — G89.29 CHRONIC MIDLINE LOW BACK PAIN WITHOUT SCIATICA: ICD-10-CM

## 2025-02-24 DIAGNOSIS — M54.50 CHRONIC MIDLINE LOW BACK PAIN WITHOUT SCIATICA: ICD-10-CM

## 2025-02-24 RX ORDER — ONDANSETRON 4 MG/1
TABLET, FILM COATED ORAL
Qty: 30 TABLET | Refills: 2 | Status: SHIPPED | OUTPATIENT
Start: 2025-02-24

## 2025-02-24 RX ORDER — IBUPROFEN 800 MG/1
800 TABLET, FILM COATED ORAL EVERY 8 HOURS PRN
Qty: 100 TABLET | Refills: 1 | Status: SHIPPED | OUTPATIENT
Start: 2025-02-24

## 2025-03-06 DIAGNOSIS — E11.42 TYPE 2 DIABETES MELLITUS WITH DIABETIC POLYNEUROPATHY, WITHOUT LONG-TERM CURRENT USE OF INSULIN: Primary | ICD-10-CM

## 2025-03-06 NOTE — TELEPHONE ENCOUNTER
Caller: Travis Yancey    Relationship to patient: Self    Best call back number: [phone]    Patient is needing: PATIENT CALLED IN AND WOULD LIKE TO TRY AGAIN     Tirzepatide (Mounjaro)   AT THE 12.5 DOSE. PATIENT SAID SHE IS NO LONGER LOSING WEIGHT AND IS READY TO TRY 12.5 DOSE AGAIN. SHE IS REQUESTING A MONTH'S SUPPLY TO TRY AGAIN. PATIENT IS REQUESTING A CALL BACK PLEASE              [facility] [id number] - [place], [place] - [address] [facility] [phone] [facility] [phone]  [phone]

## 2025-03-06 NOTE — TELEPHONE ENCOUNTER
Noted.  I have sent the 12.5 mg dose to Erin just now.  Please TICKLE to call her in 4 weeks to see if we may press to 15 mg weekly.  Thanks.

## 2025-03-17 RX ORDER — ONDANSETRON 4 MG/1
TABLET, FILM COATED ORAL
Qty: 30 TABLET | Refills: 2 | Status: SHIPPED | OUTPATIENT
Start: 2025-03-17

## 2025-03-24 ENCOUNTER — TELEPHONE (OUTPATIENT)
Dept: FAMILY MEDICINE CLINIC | Age: 60
End: 2025-03-24
Payer: MEDICARE

## 2025-03-24 DIAGNOSIS — M79.7 FIBROMYALGIA: ICD-10-CM

## 2025-03-24 DIAGNOSIS — E78.2 MIXED HYPERLIPIDEMIA: ICD-10-CM

## 2025-03-24 DIAGNOSIS — Z79.899 OTHER LONG TERM (CURRENT) DRUG THERAPY: ICD-10-CM

## 2025-03-24 DIAGNOSIS — I10 ESSENTIAL HYPERTENSION: ICD-10-CM

## 2025-03-24 DIAGNOSIS — K76.9 LIVER DISEASE, UNSPECIFIED: ICD-10-CM

## 2025-03-24 DIAGNOSIS — E11.42 TYPE 2 DIABETES MELLITUS WITH DIABETIC POLYNEUROPATHY, WITHOUT LONG-TERM CURRENT USE OF INSULIN: Primary | ICD-10-CM

## 2025-03-24 NOTE — TELEPHONE ENCOUNTER
Caller: Travis Yancey    Relationship: Self    Best call back number:   Telephone Information:   Mobile [phone]         What orders are you requesting (i.e. lab or imaging): LABS     In what timeframe would the patient need to come in: BEFORE APPOINTMENT     Where will you receive your lab/imaging services:      Additional notes:     THE PATIENT IS  WANTING HER VITAMIN LEVELS CHECKED

## 2025-03-25 ENCOUNTER — TRANSCRIBE ORDERS (OUTPATIENT)
Dept: ADMINISTRATIVE | Facility: HOSPITAL | Age: 60
End: 2025-03-25
Payer: MEDICARE

## 2025-03-25 ENCOUNTER — LAB (OUTPATIENT)
Dept: LAB | Facility: HOSPITAL | Age: 60
End: 2025-03-25
Payer: MEDICARE

## 2025-03-25 DIAGNOSIS — L65.8 OTHER SPECIFIED NONSCARRING HAIR LOSS: Primary | ICD-10-CM

## 2025-03-25 DIAGNOSIS — L65.8 OTHER SPECIFIED NONSCARRING HAIR LOSS: ICD-10-CM

## 2025-03-25 LAB
ALBUMIN SERPL-MCNC: 4.3 G/DL (ref 3.5–5.2)
ALBUMIN/GLOB SERPL: 1.5 G/DL
ALP SERPL-CCNC: 79 U/L (ref 39–117)
ALT SERPL W P-5'-P-CCNC: 22 U/L (ref 1–33)
ANION GAP SERPL CALCULATED.3IONS-SCNC: 12 MMOL/L (ref 5–15)
AST SERPL-CCNC: 22 U/L (ref 1–32)
BILIRUB SERPL-MCNC: 0.2 MG/DL (ref 0–1.2)
BUN SERPL-MCNC: 27 MG/DL (ref 6–20)
BUN/CREAT SERPL: 32.5 (ref 7–25)
CALCIUM SPEC-SCNC: 9.4 MG/DL (ref 8.6–10.5)
CHLORIDE SERPL-SCNC: 103 MMOL/L (ref 98–107)
CO2 SERPL-SCNC: 24 MMOL/L (ref 22–29)
CREAT SERPL-MCNC: 0.83 MG/DL (ref 0.57–1)
EGFRCR SERPLBLD CKD-EPI 2021: 82.3 ML/MIN/1.73
GLOBULIN UR ELPH-MCNC: 2.9 GM/DL
GLUCOSE SERPL-MCNC: 72 MG/DL (ref 65–99)
POTASSIUM SERPL-SCNC: 4.7 MMOL/L (ref 3.5–5.2)
PROT SERPL-MCNC: 7.2 G/DL (ref 6–8.5)
SODIUM SERPL-SCNC: 139 MMOL/L (ref 136–145)

## 2025-03-25 PROCEDURE — 80053 COMPREHEN METABOLIC PANEL: CPT

## 2025-03-25 PROCEDURE — 36415 COLL VENOUS BLD VENIPUNCTURE: CPT

## 2025-03-25 PROCEDURE — 80061 LIPID PANEL: CPT | Performed by: FAMILY MEDICINE

## 2025-03-25 PROCEDURE — 84443 ASSAY THYROID STIM HORMONE: CPT | Performed by: FAMILY MEDICINE

## 2025-03-26 ENCOUNTER — RESULTS FOLLOW-UP (OUTPATIENT)
Dept: FAMILY MEDICINE CLINIC | Age: 60
End: 2025-03-26
Payer: MEDICARE

## 2025-03-26 LAB
CHOLEST SERPL-MCNC: 144 MG/DL (ref 0–200)
HDLC SERPL-MCNC: 39 MG/DL (ref 40–60)
LDLC SERPL CALC-MCNC: 67 MG/DL (ref 0–100)
LDLC/HDLC SERPL: 1.5 {RATIO}
TRIGL SERPL-MCNC: 233 MG/DL (ref 0–150)
TSH SERPL DL<=0.05 MIU/L-ACNC: 1.68 UIU/ML (ref 0.27–4.2)
VLDLC SERPL-MCNC: 38 MG/DL (ref 5–40)

## 2025-03-26 NOTE — TELEPHONE ENCOUNTER
Please let Travis know that I have reviewed her chart and placed orders labs to be done at her convenience.  Unfortunately, I was not able to review this before she had labs drawn yesterday.  I think they will be able to add a couple of the labs to what was done, but she will need to stop back in at her convenience for additional testing.  She should have plenty of fluids to drink before coming.  A couple of the tests are urine test.  Let me know if she has other concerns.  Thanks.

## 2025-03-31 ENCOUNTER — LAB (OUTPATIENT)
Dept: LAB | Facility: HOSPITAL | Age: 60
End: 2025-03-31
Payer: MEDICARE

## 2025-03-31 ENCOUNTER — OFFICE VISIT (OUTPATIENT)
Dept: FAMILY MEDICINE CLINIC | Age: 60
End: 2025-03-31
Payer: MEDICARE

## 2025-03-31 VITALS
HEIGHT: 65 IN | OXYGEN SATURATION: 99 % | DIASTOLIC BLOOD PRESSURE: 62 MMHG | SYSTOLIC BLOOD PRESSURE: 91 MMHG | BODY MASS INDEX: 30.99 KG/M2 | HEART RATE: 87 BPM | WEIGHT: 186 LBS | TEMPERATURE: 98 F

## 2025-03-31 DIAGNOSIS — I10 ESSENTIAL HYPERTENSION: ICD-10-CM

## 2025-03-31 DIAGNOSIS — M79.7 FIBROMYALGIA: ICD-10-CM

## 2025-03-31 DIAGNOSIS — I50.32 CHRONIC DIASTOLIC (CONGESTIVE) HEART FAILURE: ICD-10-CM

## 2025-03-31 DIAGNOSIS — L65.9 HAIR LOSS: Primary | ICD-10-CM

## 2025-03-31 DIAGNOSIS — K76.9 LIVER DISEASE, UNSPECIFIED: ICD-10-CM

## 2025-03-31 DIAGNOSIS — Z79.899 OTHER LONG TERM (CURRENT) DRUG THERAPY: ICD-10-CM

## 2025-03-31 DIAGNOSIS — E11.42 TYPE 2 DIABETES MELLITUS WITH DIABETIC POLYNEUROPATHY, WITHOUT LONG-TERM CURRENT USE OF INSULIN: ICD-10-CM

## 2025-03-31 DIAGNOSIS — L72.11 PILAR CYST OF SCALP: ICD-10-CM

## 2025-03-31 LAB
25(OH)D3 SERPL-MCNC: 50.8 NG/ML (ref 30–100)
ALBUMIN UR-MCNC: 3 MG/DL
BACTERIA UR QL AUTO: ABNORMAL /HPF
BILIRUB UR QL STRIP: NEGATIVE
CLARITY UR: CLEAR
COLOR UR: YELLOW
CREAT UR-MCNC: 261.8 MG/DL
FOLATE SERPL-MCNC: >20 NG/ML (ref 4.78–24.2)
GLUCOSE UR STRIP-MCNC: NEGATIVE MG/DL
HBA1C MFR BLD: 4.8 % (ref 4.8–5.6)
HGB UR QL STRIP.AUTO: NEGATIVE
HYALINE CASTS UR QL AUTO: ABNORMAL /LPF
KETONES UR QL STRIP: NEGATIVE
LEUKOCYTE ESTERASE UR QL STRIP.AUTO: NEGATIVE
MICROALBUMIN/CREAT UR: 11.5 MG/G (ref 0–29)
MUCOUS THREADS URNS QL MICRO: ABNORMAL /HPF
NITRITE UR QL STRIP: NEGATIVE
PH UR STRIP.AUTO: 5.5 [PH] (ref 5–8)
PROT UR QL STRIP: NEGATIVE
RBC # UR STRIP: ABNORMAL /HPF
REF LAB TEST METHOD: ABNORMAL
SP GR UR STRIP: >=1.03 (ref 1–1.03)
SQUAMOUS #/AREA URNS HPF: ABNORMAL /HPF
UROBILINOGEN UR QL STRIP: NORMAL
VIT B12 BLD-MCNC: 1372 PG/ML (ref 211–946)
WBC # UR STRIP: ABNORMAL /HPF

## 2025-03-31 PROCEDURE — 82746 ASSAY OF FOLIC ACID SERUM: CPT

## 2025-03-31 PROCEDURE — 82570 ASSAY OF URINE CREATININE: CPT

## 2025-03-31 PROCEDURE — 83036 HEMOGLOBIN GLYCOSYLATED A1C: CPT

## 2025-03-31 PROCEDURE — 81001 URINALYSIS AUTO W/SCOPE: CPT

## 2025-03-31 PROCEDURE — 36415 COLL VENOUS BLD VENIPUNCTURE: CPT

## 2025-03-31 PROCEDURE — 82607 VITAMIN B-12: CPT

## 2025-03-31 PROCEDURE — 82306 VITAMIN D 25 HYDROXY: CPT

## 2025-03-31 PROCEDURE — 82043 UR ALBUMIN QUANTITATIVE: CPT

## 2025-03-31 RX ORDER — SPIRONOLACTONE 50 MG/1
50 TABLET, FILM COATED ORAL DAILY
COMMUNITY
Start: 2025-03-26

## 2025-03-31 RX ORDER — CARVEDILOL 3.12 MG/1
3.12 TABLET ORAL 2 TIMES DAILY WITH MEALS
Qty: 60 TABLET | Refills: 1 | Status: SHIPPED | OUTPATIENT
Start: 2025-03-31

## 2025-03-31 NOTE — PROGRESS NOTES
Travis Yancey presents to [facility].    Chief Complaint:  Hair loss    Subjective   History of Present Illness:  Travis is being seen today for follow-up on hair loss.  She has noted a general thinning of her hair especially on the top portion of the scalp.  This has become more pronounced over several months.  There may be a time association with the use of Mounjaro.  She has been on it for several months and has had about a 30 pound weight loss.  She saw dermatology recently and was given a prescription for spironolactone.  She has only recently started that medication.  It is of note that her blood pressure is low normal today.  She has not been using furosemide or potassium recently.  She does take metoprolol twice daily though for hypertension.    Review of Systems:  Review of Systems   Constitutional:  Negative for chills, diaphoresis, fatigue and fever.   HENT:  Negative for congestion, sore throat and swollen glands.    Respiratory:  Negative for cough.    Cardiovascular:  Negative for chest pain.   Gastrointestinal:  Positive for nausea. Negative for abdominal pain and vomiting.   Genitourinary:  Negative for dysuria.   Musculoskeletal:  Negative for myalgias and neck pain.   Skin:  Negative for rash.   Neurological:  Positive for numbness. Negative for weakness.      Objective   Medical History:  Past Medical History:    ADHD (attention deficit hyperactivity disorder)    Anxiety    Arthritis    Congenital hip dysplasia    Depression    Fibromyalgia    Hypertension    Mixed hyperlipidemia    Type 2 diabetes mellitus     Past Surgical History:    APPENDECTOMY    CARDIAC CATHETERIZATION    Procedure: RIGHT AND LEFT HEART CATH;  Surgeon: Jennifer Wilcox MD;  Location: [facility] INVASIVE LOCATION;  Service: Cardiology;  Laterality: Left;  right and left cardiac catheterization    CARDIAC CATHETERIZATION    Procedure: Left ventriculography;  Surgeon: Jennifer Wilcox MD;   Location:  [facility] INVASIVE LOCATION;  Service: Cardiology;  Laterality: N/A;    CARDIAC CATHETERIZATION    Procedure: Coronary angiography;  Surgeon: Jennifer Wilcox MD;  Location:  [facility] INVASIVE LOCATION;  Service: Cardiology;  Laterality: N/A;    CERVICAL FUSION    c5-6     SECTION    CHOLECYSTECTOMY    DISCECTOMY POSTERIOR THORACIC TRANSPEDICULAR APPROACH    ENDOSCOPY    Procedure: ESOPHAGOGASTRODUODENOSCOPY WITH BIOPSY;  Surgeon: Jaxon Belle Jr., MD;  Location:  [facility];  Service: General;  Laterality: N/A;  PRE-GERD  POST- GASTRITIS    PARTIAL HIP ARTHROPLASTY      Family History   Problem Relation Age of Onset    Malig Hyperthermia Neg Hx      Social History     Tobacco Use    Smoking status: Former     Current packs/day: 0.00     Types: Cigarettes     Quit date: 2019     Years since quittin.6    Smokeless tobacco: Never    Tobacco comments:     Intermitt. starting age 22, 3 packs a wk   Substance Use Topics    Alcohol use: Not Currently       Health Maintenance Due   Topic Date Due    DIABETIC EYE EXAM  Never done    URINE MICROALBUMIN-CREATININE RATIO (uACR)  Never done    TDAP/TD VACCINES (1 - Tdap) Never done    ZOSTER VACCINE (2 of 2) 2021    ANNUAL WELLNESS VISIT  10/18/2022    Pneumococcal Vaccine 50+ (2 of 2 - PCV) 10/26/2022    MAMMOGRAM  2023    PAP SMEAR  10/18/2024    HEMOGLOBIN A1C  2025        Immunization History   Administered Date(s) Administered    COVID-19 (ALEX) 06/15/2021    COVID-19 (PFIZER) 12YRS+ (COMIRNATY) 10/01/2024    COVID-19 (PFIZER) Purple Cap Monovalent 2021    Flu Vaccine Split Quad 10/06/2021    Fluzone  >6mos 10/22/2024    INFLUENZA A MONOVALENT (H5N1), ADJUVANTED-2013 10/03/2023    Influenza, Unspecified 2020    Pneumococcal Polysaccharide (PPSV23) 10/26/2021    Shingrix 10/06/2021    flucelvax quad pfs =>4 YRS 2020       Allergies   Allergen Reactions    Erythromycin GI Intolerance     "    Medications:    Current Outpatient Medications:     albuterol sulfate  (90 Base) MCG/ACT inhaler, Inhale 2 puffs Every 4 (Four) Hours As Needed for Wheezing., Disp: , Rfl:     buPROPion XL (WELLBUTRIN XL) 300 MG 24 hr tablet, Take 1 tablet by mouth Daily., Disp: 90 tablet, Rfl: 3    furosemide (LASIX) 40 MG tablet, Take 1 tablet by mouth Daily As Needed (swelling)., Disp: , Rfl:     ibuprofen (ADVIL,MOTRIN) 800 MG tablet, TAKE ONE TABLET BY MOUTH EVERY 8 HOURS AS NEEDED FOR PAIN, Disp: 100 tablet, Rfl: 1    Lancets (OneTouch Delica Plus [id number]) misc, , Disp: , Rfl:     levocetirizine (XYZAL) 5 MG tablet, Take 1 tablet by mouth Daily., Disp: 90 tablet, Rfl: 3    lidocaine (LIDODERM) 5 %, Place 1 patch on the skin as directed by provider Daily. Remove & Discard patch within 12 hours or as directed by MD, Disp: , Rfl:     metFORMIN ER (GLUCOPHAGE-XR) 500 MG 24 hr tablet, Take 2 tablets by mouth Daily With Breakfast., Disp: 180 tablet, Rfl: 3    ondansetron (ZOFRAN) 4 MG tablet, TAKE 2 TABLETS BY MOUTH EVERY 8 HOURS AS NEEDED FOR NAUSEA AND/OR VOMITING, Disp: 30 tablet, Rfl: 2    OneTouch Verio test strip, , Disp: , Rfl:     pantoprazole (PROTONIX) 40 MG EC tablet, Take 1 tablet by mouth Daily., Disp: 90 tablet, Rfl: 3    potassium chloride (MICRO-K) 10 MEQ CR capsule, Take 1 capsule by mouth Daily As Needed (when taking furosemide)., Disp: , Rfl:     pravastatin (PRAVACHOL) 40 MG tablet, Take 1 tablet by mouth Daily., Disp: 90 tablet, Rfl: 3    rOPINIRole (REQUIP) 0.25 MG tablet, Take 1 tablet by mouth every night at bedtime. (Patient taking differently: Take 1 tablet by mouth Daily As Needed. \"I dont take it every night\"), Disp: 90 tablet, Rfl: 3    spironolactone (ALDACTONE) 50 MG tablet, Take 1 tablet by mouth Daily., Disp: , Rfl:     Tirzepatide 12.5 MG/0.5ML solution auto-injector, Inject 0.5 mL under the skin into the appropriate area as directed 1 (One) Time Per Week., Disp: 6 mL, Rfl: 0    " "valACYclovir (VALTREX) 500 MG tablet, Take 1 tablet by mouth 2 (Two) Times a Day., Disp: 6 tablet, Rfl: 2    carvedilol (Coreg) 3.125 MG tablet, Take 1 tablet by mouth 2 (Two) Times a Day With Meals., Disp: 60 tablet, Rfl: 1    Vital Signs:   BP 91/62 (BP Location: Left arm, Patient Position: Sitting)   Pulse 87   Temp 98 °F (36.7 °C) (Oral)   Ht 165.1 cm (65\")   Wt 84.4 kg (186 lb)   SpO2 99%   BMI 30.95 kg/m²       Physical Exam:  Physical Exam  Vitals reviewed.   Constitutional:       General: She is not in acute distress.     Appearance: She is not ill-appearing.   Eyes:      Pupils: Pupils are equal, round, and reactive to light.   Neck:      Comments: No thyromegaly  Cardiovascular:      Rate and Rhythm: Normal rate and regular rhythm.   Pulmonary:      Effort: Pulmonary effort is normal.      Breath sounds: Normal breath sounds.   Abdominal:      General: There is no distension.      Palpations: Abdomen is soft.      Tenderness: There is no abdominal tenderness.   Musculoskeletal:      Cervical back: Neck supple.   Lymphadenopathy:      Cervical: No cervical adenopathy.   Skin:     Findings: Lesion (scalp cyst present) present. No rash.      Comments: There is general thinning of the hair particularly on the top of the scalp   Neurological:      Mental Status: She is alert.     Result Review   The following data was reviewed by Jeferson Harmon MD on 03/31/2025.  Lab Results   Component Value Date    WBC 7.69 10/22/2024    HGB 11.7 (L) 10/22/2024    HCT 37.4 10/22/2024    MCV 80.8 10/22/2024     10/22/2024     Lab Results   Component Value Date    GLUCOSE 72 03/25/2025    BUN 27 (H) 03/25/2025    CREATININE 0.83 03/25/2025     03/25/2025    K 4.7 03/25/2025     03/25/2025    CALCIUM 9.4 03/25/2025    PROTEINTOT 7.2 03/25/2025    ALBUMIN 4.3 03/25/2025    ALT 22 03/25/2025    AST 22 03/25/2025    ALKPHOS 79 03/25/2025    BILITOT 0.2 03/25/2025    GLOB 2.9 03/25/2025    AGRATIO " 1.5 03/25/2025    BCR 32.5 (H) 03/25/2025    ANIONGAP 12.0 03/25/2025    EGFR 82.3 03/25/2025     Lab Results   Component Value Date    CHOL 144 03/25/2025    TRIG 233 (H) 03/25/2025    HDL 39 (L) 03/25/2025    LDL 67 03/25/2025     Lab Results   Component Value Date    TSH 1.680 03/25/2025     Lab Results   Component Value Date    HGBA1C 5.3 09/23/2024          Assessment and Plan:   More than likely, the hair loss that she has seen is due to a combination of factors including female pattern balding along with telogen effluvium.  For the near term, I have not recommended a change in her diabetes care.  She seems healthier overall than she was at her most recent visit.  She did express concerns as to whether metoprolol or Wellbutrin might be contributing to this as well.  It would be reasonable to make a change from metoprolol to carvedilol, and we will do that.  She is already focusing on good nutrition and will be taking spironolactone in addition.  We will see how things progress.  She does have a wellness exam scheduled in 2 weeks, and I recommended she go ahead and keep that appointment.    Diagnoses and all orders for this visit:    1. Hair loss (Primary)  Comments:  As above.    2. Essential hypertension  -     carvedilol (Coreg) 3.125 MG tablet; Take 1 tablet by mouth 2 (Two) Times a Day With Meals.  Dispense: 60 tablet; Refill: 1    3. Type 2 diabetes mellitus with diabetic polyneuropathy, without long-term current use of insulin  -     Tirzepatide 12.5 MG/0.5ML solution auto-injector; Inject 0.5 mL under the skin into the appropriate area as directed 1 (One) Time Per Week.  Dispense: 6 mL; Refill: 0    4. Chronic diastolic (congestive) heart failure  -     carvedilol (Coreg) 3.125 MG tablet; Take 1 tablet by mouth 2 (Two) Times a Day With Meals.  Dispense: 60 tablet; Refill: 1    5. Pilar cyst of scalp  -     Ambulatory Referral to General Surgery    Follow Up  Return in about 15 days (around 4/15/2025)  for Recheck, Next scheduled follow up.  Patient was given instructions and counseling regarding her condition or for health maintenance advice. Please see specific information pulled into the AVS if appropriate.

## 2025-04-04 ENCOUNTER — TELEPHONE (OUTPATIENT)
Dept: FAMILY MEDICINE CLINIC | Age: 60
End: 2025-04-04
Payer: MEDICARE

## 2025-04-04 DIAGNOSIS — L72.11 PILAR CYST OF SCALP: Primary | ICD-10-CM

## 2025-04-04 NOTE — TELEPHONE ENCOUNTER
Caller: Travis Yancey    Relationship: Self    Best call back number:     [phone]     What is the medical concern/diagnosis: CYST ON HEAD    What specialty or service is being requested: Ambulatory Referral to General Surgery     What is the office location: [place]    Any additional details: PATIENT IS ASKING IF THERE IS A PROVIDER IN [place], PATIENT IS CURRENTLY SCHEDULED HOWEVER THAT OFFICE NO LONGER COMING TO [place]. PATIENT IS REQUESTING THAT THE REFERRAL NOT BE SENT TO [facility].

## 2025-04-08 ENCOUNTER — TELEPHONE (OUTPATIENT)
Dept: FAMILY MEDICINE CLINIC | Age: 60
End: 2025-04-08
Payer: MEDICARE

## 2025-04-08 RX ORDER — ONDANSETRON 4 MG/1
TABLET, FILM COATED ORAL
Qty: 30 TABLET | Refills: 2 | Status: SHIPPED | OUTPATIENT
Start: 2025-04-08

## 2025-04-08 NOTE — TELEPHONE ENCOUNTER
Caller: Travis Yancey    Relationship: Self    Best call back number: [phone]    Requested Prescriptions:   Requested Prescriptions      No prescriptions requested or ordered in this encounter    ondansetron (ZOFRAN) 4 MG tablet     Pharmacy where request should be sent: [facility] [id number] [facility] [place] - [address] - [phone]  - [phone] FX     Last office visit with prescribing clinician: 3/31/2025   Last telemedicine visit with prescribing clinician: Visit date not found   Next office visit with prescribing clinician: 4/15/2025     Additional details provided by patient: PATIENT HAS LESS THAN A THREE DAY SUPPLY OF THIS MEDICATION. SHE IS HAVING ORAL SURGERY THIS MORNING AND WILL NEED TO PICK THIS UP ON HER WAY HOME. PLEASE SEND NEW PRESCRIPTION TO PHARMACY WITH REFILLS ASAP THIS MORNING.    Does the patient have less than a 3 day supply:  [x] Yes  [] No    Would you like a call back once the refill request has been completed: [] Yes [] No    If the office needs to give you a call back, can they leave a voicemail: [] Yes [] No    Molly Tello Rep   04/08/25 08:25 EDT

## 2025-04-10 ENCOUNTER — TELEPHONE (OUTPATIENT)
Dept: FAMILY MEDICINE CLINIC | Age: 60
End: 2025-04-10
Payer: MEDICARE

## 2025-04-10 NOTE — TELEPHONE ENCOUNTER
Caller: Travis Yancey    Relationship: Self    Best call back number: [phone]     What medication are you requesting:   DIFLUCAN    If a prescription is needed, what is your preferred pharmacy and phone number: [facility] [id number] - [place], [place] - [address] [facility] [phone] [facility] [phone] FX     Additional notes: PATIENT STATES SHE HAD ORAL SURGERY (INNOVATIVE ORAL SURGERY) ON 4.8.2025 AND THEY CALLED IN AMOXICILLIN TODAY.       PER PATIENT PER ORAL SURGERY PRIMARY WOULD HAVE TO PRESCRIBE THE DIFLUCAN.    CONTACT PATIENT TO ADVISE.

## 2025-04-11 RX ORDER — FLUCONAZOLE 150 MG/1
TABLET ORAL
Qty: 2 TABLET | Refills: 0 | Status: SHIPPED | OUTPATIENT
Start: 2025-04-11

## 2025-04-11 NOTE — TELEPHONE ENCOUNTER
Noted.  I have sent Diflucan for to Erin just now for her to get if needed.  I would not recommend she take it unless she develops symptoms of yeast infection.  It can potentially interact with carvedilol causing carvedilol to be more potent.  This is unlikely to be of significant concern since she is on such a low dose of it, but I wanted to be aware.  Thanks.

## 2025-04-15 ENCOUNTER — TELEPHONE (OUTPATIENT)
Dept: FAMILY MEDICINE CLINIC | Age: 60
End: 2025-04-15

## 2025-04-15 ENCOUNTER — OFFICE VISIT (OUTPATIENT)
Dept: FAMILY MEDICINE CLINIC | Age: 60
End: 2025-04-15
Payer: MEDICARE

## 2025-04-15 VITALS
DIASTOLIC BLOOD PRESSURE: 71 MMHG | TEMPERATURE: 97.8 F | HEIGHT: 65 IN | HEART RATE: 89 BPM | SYSTOLIC BLOOD PRESSURE: 113 MMHG | BODY MASS INDEX: 30.82 KG/M2 | OXYGEN SATURATION: 100 % | WEIGHT: 185 LBS

## 2025-04-15 DIAGNOSIS — Z00.00 PHYSICAL EXAM: Primary | ICD-10-CM

## 2025-04-15 DIAGNOSIS — J30.9 CHRONIC ALLERGIC RHINITIS: ICD-10-CM

## 2025-04-15 DIAGNOSIS — Z01.419 WELL WOMAN EXAM: ICD-10-CM

## 2025-04-15 DIAGNOSIS — F32.A CHRONIC DEPRESSION: ICD-10-CM

## 2025-04-15 DIAGNOSIS — I50.32 CHRONIC DIASTOLIC (CONGESTIVE) HEART FAILURE: ICD-10-CM

## 2025-04-15 DIAGNOSIS — E78.2 MIXED HYPERLIPIDEMIA: ICD-10-CM

## 2025-04-15 DIAGNOSIS — Z12.11 SCREEN FOR COLON CANCER: ICD-10-CM

## 2025-04-15 DIAGNOSIS — I10 ESSENTIAL HYPERTENSION: ICD-10-CM

## 2025-04-15 DIAGNOSIS — Z80.0 FH: COLON CANCER: ICD-10-CM

## 2025-04-15 DIAGNOSIS — K21.9 GASTROESOPHAGEAL REFLUX DISEASE, UNSPECIFIED WHETHER ESOPHAGITIS PRESENT: ICD-10-CM

## 2025-04-15 DIAGNOSIS — E11.42 TYPE 2 DIABETES MELLITUS WITH DIABETIC POLYNEUROPATHY, WITHOUT LONG-TERM CURRENT USE OF INSULIN: ICD-10-CM

## 2025-04-15 DIAGNOSIS — Z12.31 ENCOUNTER FOR SCREENING MAMMOGRAM FOR MALIGNANT NEOPLASM OF BREAST: Primary | ICD-10-CM

## 2025-04-15 RX ORDER — METFORMIN HYDROCHLORIDE 500 MG/1
1000 TABLET, EXTENDED RELEASE ORAL
Qty: 180 TABLET | Refills: 3 | Status: SHIPPED | OUTPATIENT
Start: 2025-04-15

## 2025-04-15 RX ORDER — CARVEDILOL 3.12 MG/1
3.12 TABLET ORAL 2 TIMES DAILY WITH MEALS
Qty: 60 TABLET | Refills: 1 | Status: SHIPPED | OUTPATIENT
Start: 2025-04-15

## 2025-04-15 RX ORDER — LEVOCETIRIZINE DIHYDROCHLORIDE 5 MG/1
5 TABLET, FILM COATED ORAL DAILY
Qty: 90 TABLET | Refills: 3 | Status: SHIPPED | OUTPATIENT
Start: 2025-04-15

## 2025-04-15 RX ORDER — BUPROPION HYDROCHLORIDE 300 MG/1
300 TABLET ORAL DAILY
Qty: 90 TABLET | Refills: 3 | Status: SHIPPED | OUTPATIENT
Start: 2025-04-15

## 2025-04-15 RX ORDER — PRAVASTATIN SODIUM 40 MG
40 TABLET ORAL DAILY
Qty: 90 TABLET | Refills: 3 | Status: SHIPPED | OUTPATIENT
Start: 2025-04-15

## 2025-04-15 RX ORDER — PANTOPRAZOLE SODIUM 40 MG/1
40 TABLET, DELAYED RELEASE ORAL DAILY
Qty: 90 TABLET | Refills: 3 | Status: SHIPPED | OUTPATIENT
Start: 2025-04-15

## 2025-04-15 RX ORDER — AMOXICILLIN 500 MG/1
500 CAPSULE ORAL EVERY 8 HOURS
COMMUNITY
Start: 2025-04-10

## 2025-04-15 NOTE — PROGRESS NOTES
The ABCs of the Annual Wellness Visit  Subsequent Medicare Wellness Visit    Subjective    Travis Yancey is a 59 y.o. patient who presents for a Subsequent Medicare Wellness Visit.    The following portions of the patient's history were reviewed and updated as appropriate: allergies, current medications, past family history, past medical history, past social history, past surgical history, and problem list.    Compared to one year ago, the patient feels her physical health is better.    Compared to one year ago, the patient feels her mental health is the same.    Recent Hospitalizations:  She was not admitted to the hospital during the last year.     Current Medical Providers:  Patient Care Team:  Jeferson Harmon MD as PCP - General (Family Medicine)  Mesfin Downs MD (Psychiatry)  Daniel Elmore MD as Consulting Physician (Cardiology)    Outpatient Medications Prior to Visit   Medication Sig Dispense Refill    albuterol sulfate  (90 Base) MCG/ACT inhaler Inhale 2 puffs Every 4 (Four) Hours As Needed for Wheezing.      amoxicillin (AMOXIL) 500 MG capsule Take 1 capsule by mouth Every 8 (Eight) Hours.      furosemide (LASIX) 40 MG tablet Take 1 tablet by mouth Daily As Needed (swelling).      ibuprofen (ADVIL,MOTRIN) 800 MG tablet TAKE ONE TABLET BY MOUTH EVERY 8 HOURS AS NEEDED FOR PAIN 100 tablet 1    Lancets (OneTouch Delica Plus [id number]) misc       lidocaine (LIDODERM) 5 % Place 1 patch on the skin as directed by provider Daily. Remove & Discard patch within 12 hours or as directed by MD      ondansetron (ZOFRAN) 4 MG tablet TAKE 2 TABLETS BY MOUTH EVERY 8 HOURS AS NEEDED FOR NAUSEA AND/OR VOMITING 30 tablet 2    OneTouch Verio test strip       potassium chloride (MICRO-K) 10 MEQ CR capsule Take 1 capsule by mouth Daily As Needed (when taking furosemide).      rOPINIRole (REQUIP) 0.25 MG tablet Take 1 tablet by mouth every night at bedtime. (Patient taking differently: Take 1  "tablet by mouth Daily As Needed. \"I dont take it every night\") 90 tablet 3    spironolactone (ALDACTONE) 50 MG tablet Take 1 tablet by mouth Daily.      Tirzepatide 12.5 MG/0.5ML solution auto-injector Inject 0.5 mL under the skin into the appropriate area as directed 1 (One) Time Per Week. 6 mL 0    valACYclovir (VALTREX) 500 MG tablet Take 1 tablet by mouth 2 (Two) Times a Day. (Patient taking differently: Take 1 tablet by mouth As Needed.) 6 tablet 2    buPROPion XL (WELLBUTRIN XL) 300 MG 24 hr tablet Take 1 tablet by mouth Daily. 90 tablet 3    carvedilol (Coreg) 3.125 MG tablet Take 1 tablet by mouth 2 (Two) Times a Day With Meals. 60 tablet 1    levocetirizine (XYZAL) 5 MG tablet Take 1 tablet by mouth Daily. 90 tablet 3    metFORMIN ER (GLUCOPHAGE-XR) 500 MG 24 hr tablet Take 2 tablets by mouth Daily With Breakfast. 180 tablet 3    pantoprazole (PROTONIX) 40 MG EC tablet Take 1 tablet by mouth Daily. 90 tablet 3    pravastatin (PRAVACHOL) 40 MG tablet Take 1 tablet by mouth Daily. 90 tablet 3    fluconazole (Diflucan) 150 MG tablet Take 1 tablet now.  Repeat dose in 1 week if needed. (Patient not taking: Reported on 4/15/2025) 2 tablet 0     No facility-administered medications prior to visit.       No opioid medication identified on active medication list. I have reviewed chart for other potential  high risk medication/s and harmful drug interactions in the elderly.      Aspirin is not on active medication list.  Aspirin use is not indicated based on review of current medical condition/s. Risk of harm outweighs potential benefits.      Patient Active Problem List   Diagnosis    Anemia    Anxiety    Arthritis    Bowel and bladder incontinence    Chronic bronchitis    Chronic low back pain    Bipolar disorder    Depression    Esophageal reflux    Fibromyalgia    Mixed hyperlipidemia    Essential hypertension    Migraine    Neuropathy    Orthostatic hypotension    Chronic hip pain    Type 2 diabetes mellitus " "with diabetic polyneuropathy    BREE on CPAP    Chronic diastolic (congestive) heart failure    Syncope and collapse    JANE (acute kidney injury)    Obesity hypoventilation syndrome    Cervical spinal stenosis    Other long term (current) drug therapy    Osteoarthritis of hip    Herpes simplex    Dietary counseling    PTSD (post-traumatic stress disorder)    Attention deficit hyperactivity disorder (ADHD)    Obesity, Class III, BMI 40-49.9 (morbid obesity)     Advance Care Planning  Advance Directive is not on file.  ACP discussion was held with the patient during this visit. Patient does not have an advance directive, information provided.     Objective    Vitals:    04/15/25 1300   BP: 113/71   Pulse: 89   Temp: 97.8 °F (36.6 °C)   TempSrc: Temporal   SpO2: 100%   Weight: 83.9 kg (185 lb)   Height: 165.1 cm (65\")   PainSc: 4      Estimated body mass index is 30.79 kg/m² as calculated from the following:    Height as of this encounter: 165.1 cm (65\").    Weight as of this encounter: 83.9 kg (185 lb).    BMI is >= 30 and <35. (Class 1 Obesity). The following options were offered after discussion;: exercise counseling/recommendations and nutrition counseling/recommendations    Does the patient have evidence of cognitive impairment? No    Lab Results   Component Value Date    TRIG 233 (H) 2025    HDL 39 (L) 2025    LDL 67 2025    VLDL 38 2025    HGBA1C 4.80 2025        HEALTH RISK ASSESSMENT    Smoking Status:  Social History     Tobacco Use   Smoking Status Former    Current packs/day: 0.00    Types: Cigarettes    Quit date:     Years since quittin.3   Smokeless Tobacco Never   Tobacco Comments    Intermitt. starting age 22, 3 packs a wk     Alcohol Consumption:  Social History     Substance and Sexual Activity   Alcohol Use Not Currently     Fall Risk Screen:    STEADI Fall Risk Assessment was completed, and patient is at LOW risk for falls.Assessment completed " on:4/15/2025    Depression Screenin/15/2025     1:05 PM   PHQ-2/PHQ-9 Depression Screening   Little interest or pleasure in doing things Not at all   Feeling down, depressed, or hopeless Not at all       Health Habits and Functional and Cognitive Screenin/15/2025     1:05 PM   Functional & Cognitive Status   Do you have difficulty preparing food and eating? No   Do you have difficulty bathing yourself, getting dressed or grooming yourself? No   Do you have difficulty using the toilet? No   Do you have difficulty moving around from place to place? No   Do you have trouble with steps or getting out of a bed or a chair? No   Current Diet Well Balanced Diet   Dental Exam Up to date   Eye Exam Up to date   Exercise (times per week) 0 times per week   Current Exercises Include No Regular Exercise   Do you need help using the phone?  No   Are you deaf or do you have serious difficulty hearing?  No   Do you need help to go to places out of walking distance? No   Do you need help shopping? No   Do you need help preparing meals?  No   Do you need help with housework?  No   Do you need help with laundry? No   Do you need help taking your medications? No   Do you need help managing money? No   Do you ever drive or ride in a car without wearing a seat belt? No   Have you felt unusual stress, anger or loneliness in the last month? No   Who do you live with? Alone   If you need help, do you have trouble finding someone available to you? No   Do you have difficulty concentrating, remembering or making decisions? Yes       Age-appropriate Screening Schedule:  Refer to the list below for future screening recommendations based on patient's age, sex and/or medical conditions. Orders for these recommended tests are listed in the plan section. The patient has been provided with a written plan.    Health Maintenance   Topic Date Due    TDAP/TD VACCINES (1 - Tdap) Never done    ZOSTER VACCINE (2 of 2) 2021     Pneumococcal Vaccine 50+ (2 of 2 - PCV) 10/26/2022    MAMMOGRAM  11/16/2023    PAP SMEAR  10/18/2024    INFLUENZA VACCINE  07/01/2025    HEMOGLOBIN A1C  09/30/2025    LIPID PANEL  03/25/2026    URINE MICROALBUMIN-CREATININE RATIO (uACR)  03/31/2026    DIABETIC EYE EXAM  04/04/2026    ANNUAL WELLNESS VISIT  04/15/2026    COLORECTAL CANCER SCREENING  10/23/2028    HEPATITIS C SCREENING  Completed    COVID-19 Vaccine  Completed    Hepatitis B  Discontinued          CMS Preventative Services Quick Reference  Risk Factors Identified During Encounter  Depression/Dysphoria: Current medication is effective, no change recommended  Immunizations Discussed/Encouraged: Tdap, Prevnar 20 (Pneumococcal 20-valent conjugate), and Shingrix  The above risks/problems have been discussed with the patient.  Pertinent information has been shared with the patient in the After Visit Summary.  An After Visit Summary and PPPS were made available to the patient.    Follow Up:   Next Medicare Wellness visit to be scheduled in 1 year.     Additional E&M Note during same encounter follows:  Patient has multiple medical problems which are significant and separately identifiable that require additional work above and beyond the Medicare Wellness Visit.      Chief Complaint:  Diabetes and other issues    Subjective    History of Present Illness:  In addition to the Medicare wellness exam, She has type 2 diabetes for which she currently takes Mounjaro and metformin.  She is tolerating her medicines relatively well.  She continues to have some weight loss with Mounjaro.     She also has hypertension and elevated cholesterol.  She remains on treatment for these issues.  Her blood pressure is well-controlled today.     She also has depression for which she currently takes Wellbutrin.  It is of benefit for her.  She feels that she has been relatively stable emotionally recently.    Review of Systems:  Review of Systems   Constitutional:  Negative for  "chills and fever.   Respiratory:  Negative for cough and shortness of breath.    Cardiovascular:  Negative for chest pain and palpitations.   Gastrointestinal:  Negative for abdominal pain, nausea and vomiting.      Objective   Vital Signs:  /71   Pulse 89   Temp 97.8 °F (36.6 °C) (Temporal)   Ht 165.1 cm (65\")   Wt 83.9 kg (185 lb)   SpO2 100%   BMI 30.79 kg/m²     Physical Exam  Vitals and nursing note reviewed.   Constitutional:       General: She is not in acute distress.     Appearance: She is obese. She is not ill-appearing.   HENT:      Right Ear: Tympanic membrane and ear canal normal.      Left Ear: Tympanic membrane and ear canal normal.      Ears:      Comments: Hearing is normal with forced whisper bilaterally.     Mouth/Throat:      Mouth: Mucous membranes are moist.      Comments: Pharynx appears normal  Eyes:      Extraocular Movements: Extraocular movements intact.      Pupils: Pupils are equal, round, and reactive to light.      Comments: Binocular vision is 20/20 with correction.   Neck:      Thyroid: No thyromegaly.   Cardiovascular:      Rate and Rhythm: Normal rate and regular rhythm.      Heart sounds: No murmur heard.  Pulmonary:      Effort: Pulmonary effort is normal.      Breath sounds: Normal breath sounds.   Abdominal:      General: There is no distension.      Palpations: Abdomen is soft. There is no mass.      Tenderness: There is no abdominal tenderness.   Musculoskeletal:      Cervical back: Normal range of motion.      Right lower leg: No edema.      Left lower leg: No edema.   Skin:     Findings: No lesion or rash.   Neurological:      General: No focal deficit present.      Mental Status: She is oriented to person, place, and time.      Cranial Nerves: No cranial nerve deficit.      Motor: No weakness.      Coordination: Coordination normal.      Gait: Gait normal.   Psychiatric:         Mood and Affect: Mood normal.     The following data was reviewed by Jeferson Sanchez " MD Faustino on 04/15/2025.  Lab Results   Component Value Date    WBC 7.69 10/22/2024    HGB 11.7 (L) 10/22/2024    HCT 37.4 10/22/2024    MCV 80.8 10/22/2024     10/22/2024     Lab Results   Component Value Date    GLUCOSE 72 03/25/2025    BUN 27 (H) 03/25/2025    CREATININE 0.83 03/25/2025     03/25/2025    K 4.7 03/25/2025     03/25/2025    CALCIUM 9.4 03/25/2025    PROTEINTOT 7.2 03/25/2025    ALBUMIN 4.3 03/25/2025    ALT 22 03/25/2025    AST 22 03/25/2025    ALKPHOS 79 03/25/2025    BILITOT 0.2 03/25/2025    GLOB 2.9 03/25/2025    AGRATIO 1.5 03/25/2025    BCR 32.5 (H) 03/25/2025    ANIONGAP 12.0 03/25/2025    EGFR 82.3 03/25/2025     Lab Results   Component Value Date    CHOL 144 03/25/2025    TRIG 233 (H) 03/25/2025    HDL 39 (L) 03/25/2025    LDL 67 03/25/2025     Lab Results   Component Value Date    TSH 1.680 03/25/2025     Lab Results   Component Value Date    HGBA1C 4.80 03/31/2025             Assessment and Plan:   Today, we have reviewed Travis's care.  Regarding the Medicare wellness exam, she is due for well woman exam, and referral will be made to gynecology regarding this.  She is also due for colonoscopy, and we will make referral for that.  She has an appointment already scheduled with Dr. Herzog next week, and we will add colon cancer screening to the referral order.  We also reviewed vaccines today and recommended she move ahead with Prevnar 20 at her pharmacy at her earliest convenience.    Regarding her usual care, Travis's problems are relatively stable.  For now, we will refill her medications and tentatively plan to see her back in about 6 months, sooner if needed.    Diagnoses and all orders for this visit:    1. Physical exam (Primary)    2. Type 2 diabetes mellitus with diabetic polyneuropathy, without long-term current use of insulin  -     metFORMIN ER (GLUCOPHAGE-XR) 500 MG 24 hr tablet; Take 2 tablets by mouth Daily With Breakfast.  Dispense: 180 tablet; Refill:  3    3. Essential hypertension  -     carvedilol (Coreg) 3.125 MG tablet; Take 1 tablet by mouth 2 (Two) Times a Day With Meals.  Dispense: 60 tablet; Refill: 1    4. Mixed hyperlipidemia  -     pravastatin (PRAVACHOL) 40 MG tablet; Take 1 tablet by mouth Daily.  Dispense: 90 tablet; Refill: 3    5. Chronic diastolic (congestive) heart failure  -     carvedilol (Coreg) 3.125 MG tablet; Take 1 tablet by mouth 2 (Two) Times a Day With Meals.  Dispense: 60 tablet; Refill: 1    6. Well woman exam  -     Ambulatory Referral to Obstetrics / Gynecology    7. FH: colon cancer  -     Ambulatory Referral For Screening Colonoscopy    8. Screen for colon cancer  -     Ambulatory Referral For Screening Colonoscopy    9. Chronic depression  -     buPROPion XL (WELLBUTRIN XL) 300 MG 24 hr tablet; Take 1 tablet by mouth Daily.  Dispense: 90 tablet; Refill: 3    10. Chronic allergic rhinitis  -     levocetirizine (XYZAL) 5 MG tablet; Take 1 tablet by mouth Daily.  Dispense: 90 tablet; Refill: 3    11. Gastroesophageal reflux disease, unspecified whether esophagitis present  -     pantoprazole (PROTONIX) 40 MG EC tablet; Take 1 tablet by mouth Daily.  Dispense: 90 tablet; Refill: 3       Follow Up  Return in about 6 months (around 10/15/2025) for Recheck, Next scheduled follow up.  Patient was given instructions and counseling regarding her condition or for health maintenance advice. Please see specific information pulled into the AVS if appropriate.

## 2025-04-15 NOTE — Clinical Note
Please reach out to Travis regarding vaccines.  If she has not had Prevnar 20, I would recommend she check with her pharmacy about it.  It would also be reasonable to move ahead with Shingrix and/or Tdap at her convenience.  Thanks.

## 2025-04-15 NOTE — TELEPHONE ENCOUNTER
Caller: Travis Yancey    Relationship: Self    Best call back number: [phone]    What is the best time to reach you: ANYTIME    Who are you requesting to speak with (clinical staff, provider,  specific staff member): NURSE     What was the call regarding: PATIENT IS CALLING REGARDING LAST TIME SHE HAD HER MAMMOGRAM, IT WAS MARCH 7TH OF LAST YEAR.  [facility] IN [place], [place].   PATIENT'S FAMILY DOCTOR WAS [facility] IT WAS THROUGH [facility] AND BHARTI LARSON WAS HER PCP     PULMONARY LAB DONE AT [facility] AND ULTRA SOUND WAS DONE THERE AS WELL.      SHE WOULD LIKE TO GO AHEAD AND GET A REFERRAL FOR A MAMMOGRAM

## 2025-04-15 NOTE — Clinical Note
Please reach out to [facility] in [facility] for a copy of most recent mammogram.  Thanks.

## 2025-04-15 NOTE — Clinical Note
Please contact [facility] for a copy of most recent mammogram and CT chest.  She may have also had stress test, EKG, or echocardiogram.  Thanks.

## 2025-04-15 NOTE — Clinical Note
She is seeing Dr. Herzog from general surgery next week.  She is due for colonoscopy because of a family history of colon cancer, and I would like the consultation to also include that.  Thanks.

## 2025-04-18 ENCOUNTER — TELEPHONE (OUTPATIENT)
Dept: FAMILY MEDICINE CLINIC | Age: 60
End: 2025-04-18
Payer: MEDICARE

## 2025-04-18 NOTE — TELEPHONE ENCOUNTER
Pt gt a message that she is needing a lung cancer screening and she is wanting to get this ordered. Could an order be put in for this so she can get it scheduled?   She is thinks that she needs her mammo and heart scan too.

## 2025-04-28 ENCOUNTER — TELEPHONE (OUTPATIENT)
Dept: SURGERY | Facility: CLINIC | Age: 60
End: 2025-04-28
Payer: MEDICARE

## 2025-04-28 RX ORDER — ONDANSETRON 4 MG/1
8 TABLET, FILM COATED ORAL EVERY 8 HOURS PRN
Qty: 30 TABLET | Refills: 2 | Status: SHIPPED | OUTPATIENT
Start: 2025-04-28

## 2025-04-28 NOTE — TELEPHONE ENCOUNTER
PER MALCOM, CALL PT TO INFORM HER HER VISIT ON TUESDAY 4/29 WILL NOT INCLUDE COLON CONSULT AS [facility] DOES NOT DO SCOPES AND SEE IF SHE WOULD LIKE TO SEE ANOTHER PROVIDER FOR BOTH ISSUES, OR SCHEDULE A COLON CONSULT W/ APRIL TANNER.    NO ANSWER, LMOM

## 2025-04-29 DIAGNOSIS — E11.42 TYPE 2 DIABETES MELLITUS WITH DIABETIC POLYNEUROPATHY, WITHOUT LONG-TERM CURRENT USE OF INSULIN: Primary | ICD-10-CM

## 2025-04-29 NOTE — TELEPHONE ENCOUNTER
Caller: Travis Yancey    Relationship: Self    Best call back number: [phone]     What medication are you requesting:     Tirzepatide     If a prescription is needed, what is your preferred pharmacy and phone number: [id number] - [place], [place] - [address] [facility] - [phone] [facility] [phone] FX     Additional notes: PATIENT IS ASKING FOR AN INCREASE IN DOSAGE FOR MEDICATION LISTED TO THE 15 MG, PATIENT STATES SHE FEELS LIKE SHE IS NEEDING THAT INCREASE DUE TO THE CURRENT DOSE IS NOT ASSISTING AS IT WAS. PATIENT IS DUE FOR THE NEXT INJECTION ON FRIDAY.

## 2025-05-06 ENCOUNTER — OFFICE VISIT (OUTPATIENT)
Age: 60
End: 2025-05-06
Payer: MEDICARE

## 2025-05-06 VITALS
BODY MASS INDEX: 30.81 KG/M2 | DIASTOLIC BLOOD PRESSURE: 64 MMHG | SYSTOLIC BLOOD PRESSURE: 109 MMHG | HEIGHT: 65 IN | RESPIRATION RATE: 18 BRPM | HEART RATE: 94 BPM | WEIGHT: 184.9 LBS | OXYGEN SATURATION: 98 %

## 2025-05-06 DIAGNOSIS — Z82.62 FAMILY HISTORY OF OSTEOPOROSIS IN MOTHER: ICD-10-CM

## 2025-05-06 DIAGNOSIS — Z13.820 ENCOUNTER FOR OSTEOPOROSIS SCREENING IN ASYMPTOMATIC POSTMENOPAUSAL PATIENT: ICD-10-CM

## 2025-05-06 DIAGNOSIS — Z11.51 SCREENING FOR HUMAN PAPILLOMAVIRUS (HPV): ICD-10-CM

## 2025-05-06 DIAGNOSIS — N63.12 MASS OF UPPER INNER QUADRANT OF RIGHT BREAST: ICD-10-CM

## 2025-05-06 DIAGNOSIS — R92.8 ABNORMALITY OF RIGHT BREAST ON SCREENING MAMMOGRAM: ICD-10-CM

## 2025-05-06 DIAGNOSIS — Z01.419 WELL FEMALE EXAM WITH ROUTINE GYNECOLOGICAL EXAM: Primary | ICD-10-CM

## 2025-05-06 DIAGNOSIS — R92.8 ABNORMALITY OF BOTH BREASTS ON SCREENING MAMMOGRAM: ICD-10-CM

## 2025-05-06 DIAGNOSIS — Z78.0 ENCOUNTER FOR OSTEOPOROSIS SCREENING IN ASYMPTOMATIC POSTMENOPAUSAL PATIENT: ICD-10-CM

## 2025-05-06 DIAGNOSIS — F43.21 GRIEF: ICD-10-CM

## 2025-05-06 DIAGNOSIS — Z12.4 SCREENING FOR MALIGNANT NEOPLASM OF CERVIX: ICD-10-CM

## 2025-05-06 PROCEDURE — 88175 CYTOPATH C/V AUTO FLUID REDO: CPT

## 2025-05-06 PROCEDURE — 87624 HPV HI-RISK TYP POOLED RSLT: CPT

## 2025-05-06 NOTE — PROGRESS NOTES
Subjective     Chief Complaint   Patient presents with    Gynecologic Exam     Annual Exam, Last Pap was 2021, Pap was normal and negative HPV, Mammogram scheduled for 5/29/25, needs to be scheduled for Dexa scan       History of Present Illness    Travis Yancey is a 59 y.o. No obstetric history on file. who presents for annual exam.  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        Menstrual History:     No LMP recorded. Patient is postmenopausal.         Current contraception: post menopausal status  History of abnormal Pap smear: yes - history   Received Gardasil immunization: no  Perform regular self breast exam: no  Family history of uterine or ovarian cancer: yes - mom  Family History of colon cancer: mom  Family history of breast cancer: yes - maternal aunts      Mammogram: planned for this month  Colonoscopy: recommended.  DEXA: ordered.    Exercise: not active  Calcium/Vitamin D: adequate intake and uses supplements    The following portions of the patient's history were reviewed and updated as appropriate: allergies, current medications, past family history, past medical history, past social history, past surgical history, and problem list.    Review of Systems   Constitutional: Negative.    HENT: Negative.     Eyes: Negative.    Respiratory: Negative.     Cardiovascular: Negative.    Gastrointestinal: Negative.    Endocrine: Negative.    Genitourinary: Negative.    Musculoskeletal: Negative.    Skin: Negative.    Allergic/Immunologic: Negative.    Neurological: Negative.    Hematological: Negative.    Psychiatric/Behavioral: Negative.         Patient reports that she is not currently experiencing any symptoms of urinary incontinence.       Objective   Physical  "Exam  Vitals and nursing note reviewed. Exam conducted with a chaperone present.   Constitutional:       Appearance: Normal appearance.   HENT:      Head: Normocephalic.   Eyes:      Pupils: Pupils are equal, round, and reactive to light.   Cardiovascular:      Rate and Rhythm: Normal rate and regular rhythm.      Pulses: Normal pulses.      Heart sounds: Normal heart sounds.   Pulmonary:      Effort: Pulmonary effort is normal.      Breath sounds: Normal breath sounds.   Chest:   Breasts:     Right: Normal. Mass and tenderness present.      Left: Normal.          Comments: Right mobile mass noted pea sized   Abdominal:      General: Abdomen is flat. Bowel sounds are normal.      Palpations: Abdomen is soft.   Genitourinary:     General: Normal vulva.      Exam position: Lithotomy position.      Vagina: Normal.      Cervix: Normal.      Uterus: Normal.       Adnexa: Right adnexa normal and left adnexa normal.      Rectum: Normal.   Musculoskeletal:         General: Normal range of motion.      Cervical back: Full passive range of motion without pain and normal range of motion.      Right lower leg: No edema.      Left lower leg: No edema.   Lymphadenopathy:      Head:      Right side of head: No submental or submandibular adenopathy.      Left side of head: No submental or submandibular adenopathy.   Skin:     General: Skin is warm and dry.   Neurological:      General: No focal deficit present.      Mental Status: She is alert.      Gait: Gait is intact.   Psychiatric:         Attention and Perception: Attention normal.         Mood and Affect: Mood normal.         Speech: Speech normal.         /64   Pulse 94   Resp 18   Ht 165.1 cm (65\")   Wt 83.9 kg (184 lb 14.4 oz)   SpO2 98%   BMI 30.77 kg/m²     Assessment & Plan   Diagnoses and all orders for this visit:    1. Well female exam with routine gynecological exam (Primary)  -     IGP, Apt HPV,rfx 16 / 18,45    2. Screening for human papillomavirus " (HPV)  -     IGP, Apt HPV,rfx 16 / 18,45    3. Screening for malignant neoplasm of cervix  -     IGP, Apt HPV,rfx 16 / 18,45    4. Family history of osteoporosis in mother  -     DEXA Bone Density Axial; Future    5. Encounter for osteoporosis screening in asymptomatic postmenopausal patient  -     DEXA Bone Density Axial; Future    6. Abnormality of right breast on screening mammogram    7. Abnormality of both breasts on screening mammogram    8. Mass of upper inner quadrant of right breast  -     US Breast Right Limited; Future  -     Mammo Diagnostic Digital Tomosynthesis Bilateral With CAD; Future      Pap today guidelines reviewed  Plan to complete diagnostic breast imaging   Has a referral to GI for c-scope  Discussed grief counseling she will consider but is ok right now  Family hx of osteoporosis early with her mom she would like a dexa   All questions answered and addressed  Breast self exam technique reviewed and patient encouraged to perform self-exam monthly.  Discussed healthy lifestyle modifications.  Recommended 30 minutes of aerobic exercise five times per week.  Discussed vit d and calcium needs to prevent osteoporosis.  Call the office if you have not received results from today's visit within 2 weeks               JUNO Garcia  5/6/2025, 13:04 EDT

## 2025-05-09 ENCOUNTER — PATIENT ROUNDING (BHMG ONLY) (OUTPATIENT)
Age: 60
End: 2025-05-09
Payer: MEDICARE

## 2025-05-09 LAB
CYTOLOGIST CVX/VAG CYTO: NORMAL
CYTOLOGY CVX/VAG DOC CYTO: NORMAL
CYTOLOGY CVX/VAG DOC THIN PREP: NORMAL
DX ICD CODE: NORMAL
HPV I/H RISK 4 DNA CVX QL PROBE+SIG AMP: NEGATIVE
OTHER STN SPEC: NORMAL
SERVICE CMNT-IMP: NORMAL
STAT OF ADQ CVX/VAG CYTO-IMP: NORMAL

## 2025-05-09 NOTE — PROGRESS NOTES
".\"A [employer] message has been sent to the patient for [place]IENT [place] [facility]  "

## 2025-05-12 DIAGNOSIS — G89.29 CHRONIC MIDLINE LOW BACK PAIN WITHOUT SCIATICA: ICD-10-CM

## 2025-05-12 DIAGNOSIS — M54.50 CHRONIC MIDLINE LOW BACK PAIN WITHOUT SCIATICA: ICD-10-CM

## 2025-05-12 RX ORDER — IBUPROFEN 800 MG/1
800 TABLET, FILM COATED ORAL EVERY 8 HOURS PRN
Qty: 100 TABLET | Refills: 1 | Status: SHIPPED | OUTPATIENT
Start: 2025-05-12

## 2025-05-19 ENCOUNTER — HOSPITAL ENCOUNTER (OUTPATIENT)
Dept: MAMMOGRAPHY | Facility: HOSPITAL | Age: 60
Discharge: HOME | End: 2025-05-19
Payer: MEDICARE

## 2025-05-19 ENCOUNTER — TELEPHONE (OUTPATIENT)
Dept: FAMILY MEDICINE CLINIC | Age: 60
End: 2025-05-19
Payer: MEDICARE

## 2025-05-19 ENCOUNTER — HOSPITAL ENCOUNTER (OUTPATIENT)
Dept: ULTRASOUND IMAGING | Facility: HOSPITAL | Age: 60
Discharge: HOME | End: 2025-05-19
Payer: MEDICARE

## 2025-05-19 ENCOUNTER — TELEPHONE (OUTPATIENT)
Age: 60
End: 2025-05-19
Payer: MEDICARE

## 2025-05-19 DIAGNOSIS — N63.12 MASS OF UPPER INNER QUADRANT OF RIGHT BREAST: ICD-10-CM

## 2025-05-19 PROCEDURE — G0279 TOMOSYNTHESIS, MAMMO: HCPCS

## 2025-05-19 PROCEDURE — 76642 ULTRASOUND BREAST LIMITED: CPT

## 2025-05-19 PROCEDURE — 77066 DX MAMMO INCL CAD BI: CPT

## 2025-05-19 RX ORDER — ONDANSETRON 4 MG/1
TABLET, FILM COATED ORAL
Qty: 30 TABLET | Refills: 2 | Status: SHIPPED | OUTPATIENT
Start: 2025-05-19

## 2025-05-19 NOTE — TELEPHONE ENCOUNTER
Patient called the office requesting more extensive testing for Breast Cancer due to family history of multiple cancers. Patient recently had Mammogram and Breast US completed that currently showed no malignancy. Patient also requested for me to reach out for her previous Mammogram records again from 2024. Patient has signed records release for our office. Patient also inquired about medication that was requested last week. I told the patient I have sent her information over to her provider and I will try to reach out for her records again. Patient acknowledged and understood.

## 2025-05-19 NOTE — TELEPHONE ENCOUNTER
Caller: Travis Yancey    Relationship: Self    Best call back number: [phone]      What test was performed: DIAGNOSTIC MAMMOGRAM AND ULTRA SOUND    When was the test performed: 05/19/2025    Where was the test performed: [facility]     Additional notes: PATIENT WAS GIVEN INFORMATION AFTER THE EXAMS BY THE TECHNICIANS AND SHE WAS TOLD THERE WERE KNOTS THAT WERE BENIGN ACCORDING TO THE RADIOLOGIST   AFTER SEEING THE RESULTS IN HER MY CHART SHE IS NOT SURE SHE WAS GIVEN ACCURATE INFORMATION  THERE IS A HISTORY OF BREAST CANCER IN HER FAMILY AND SHE WANTS ANOTHER OPINION   PLEASE CONTACT AND ADVISE

## 2025-05-19 NOTE — TELEPHONE ENCOUNTER
"[facility] Internal Medicine Progress Note            Interval History:   Record reviewed. Seen with RN. Remains on IV vancomycin. No complaints.    Indicates continues to feel well.  WD symptoms controlled with sebutex.  Ambulatory without  LH.  No CP, SOB, cough, nausea,  reflux, abd pain.  Tolerating diet.  Regular formed BM.  Voiding OK.  Not accepted at [place].           Medications:   All medications reviewed today          Physical Exam:   Blood pressure (!) 150/92, pulse 86, temperature 96.7  F (35.9  C), temperature source Oral, resp. rate 19, height 1.803 m (5' 11\"), weight 74.3 kg (163 lb 12.8 oz), SpO2 98 %.    Intake/Output Summary (Last 24 hours) at 7/20/2019 1629  Last data filed at 7/20/2019 1149  Gross per 24 hour   Intake 450 ml   Output 400 ml   Net 50 ml       General:  Alert.   Fluent speech.  Cooperative.  Orientated.   No clinical distress.   No O2.  -150    Heent:       Neck:    Skin:    Chest:  clear    Cardiac:  Reg without gallop, murmur.  No JVD.     Abdomen:  Non distended, soft, non tender.   BS normal.     Extremities:  Perfused.  RLE - circumferential swelling improved..  No appreciable erythema.  No calf, posterior thigh tenderness to suggest DVT.      Neuro:  No tremor.             Data:     Results for orders placed or performed during the hospital encounter of 07/20/19 (from the past 24 hour(s))   Creatinine   Result Value Ref Range    Creatinine 0.56 (L) 0.66 - 1.25 mg/dL    GFR Estimate >90 >60 mL/min/[1.73_m2]    GFR Estimate If Black >90 >60 mL/min/[1.73_m2]   Platelet count   Result Value Ref Range    Platelet Count 446 150 - 450 10e9/L      7/20  Last Comprehensive Metabolic Panel:  Sodium   Date Value Ref Range Status   07/22/2019 140 133 - 144 mmol/L Final     Potassium   Date Value Ref Range Status   07/22/2019 3.5 3.4 - 5.3 mmol/L Final     Chloride   Date Value Ref Range Status   07/22/2019 110 (H) 94 - 109 mmol/L Final     Carbon Dioxide   Date " Provider: Kya Spaulding APRN     Caller: Travis Yancey    Relationship to Patient: Self        Reason for Call: PT REQ A CALL BACK REG MOST RECENT RESULTS  UNABLE TO WARM TRANSFER     Value Ref Range Status   07/22/2019 24 20 - 32 mmol/L Final     Anion Gap   Date Value Ref Range Status   07/22/2019 6 3 - 14 mmol/L Final     Glucose   Date Value Ref Range Status   07/22/2019 99 70 - 99 mg/dL Final     Urea Nitrogen   Date Value Ref Range Status   07/22/2019 4 (L) 7 - 30 mg/dL Final     Creatinine   Date Value Ref Range Status   07/26/2019 0.56 (L) 0.66 - 1.25 mg/dL Final     GFR Estimate   Date Value Ref Range Status   07/26/2019 >90 >60 mL/min/[1.73_m2] Final     Comment:     Non  GFR Calc  Starting 12/18/2018, serum creatinine based estimated GFR (eGFR) will be   calculated using the Chronic Kidney Disease Epidemiology Collaboration   (CKD-EPI) equation.       Calcium   Date Value Ref Range Status   07/22/2019 8.4 (L) 8.5 - 10.1 mg/dL Final     CRP 34.8 7/22         Assessment and Plan:   1)  RLE cellulitis dating back to 6/8 incompletely or inadequately treated.  Essentially resolved on IV vanco.  Neg follow up doppler for DVT.   2)  MRSA bacteremia documented 6/8.  Clinically not toxic or septic appearing.  On IV vanco.  Neg Echo.  Planned 4 week course IV ABs.   3)  IV heroin dependence, continuous.  WD controlled on subutex.    4)  HTN, labile.  Adequate for now on increased lopressor and norvasc. .    5)  Depression, anxiety per record. Appears compensated.   6)  Altered MS with sedation, mild confusion ojn admission related to substance use.  Resolved.   7)  Peripheral neuropathy per record previously on neurontin.   8)  Nausea pc with epigastric tenderness possibly acid peptic. Normal hepatic function. Resolved.  On PPI.   9)  Hepatitis C with active disease status.     PLAN:  1)   Continue IV vanco.  2)  Monitor COWs.  Sebutex prn.   3)  CD consult reviewed - Pt is open to  [facility] for treatment.  4) Continue amlodipine 2.5 mg and Lopressor 25 mg BID.  5)  On empiric protonix.  6)  Trend labs.  Monitor clinically.   Disposition Plan   Expected discharge pending CD  evaluation after 4 weeks IV ABs.  Care Coordinator looking into other options.      Entered: Marcus Arguelles 07/26/2019, 5:25 PM              Attestation:  I have reviewed today's vital signs, notes, medications, labs and imaging.     Marcus Arguelles MD

## 2025-05-20 DIAGNOSIS — Z80.3 FAMILY HISTORY OF BREAST CANCER: Primary | ICD-10-CM

## 2025-05-21 ENCOUNTER — CLINICAL SUPPORT (OUTPATIENT)
Age: 60
End: 2025-05-21
Payer: MEDICARE

## 2025-05-21 DIAGNOSIS — Z80.3 FAMILY HISTORY OF BREAST CANCER: Primary | ICD-10-CM

## 2025-05-22 ENCOUNTER — TELEPHONE (OUTPATIENT)
Dept: SURGERY | Facility: CLINIC | Age: 60
End: 2025-05-22
Payer: MEDICARE

## 2025-05-22 NOTE — TELEPHONE ENCOUNTER
New patient appointment scheduled with Ruthie Hamm APRN    7/14/2025 1:00 PM Arrival 15 minutes prior to appointment time.     New patient packet mailed. She expressed v/u of appointment date and time.

## 2025-05-27 ENCOUNTER — HOSPITAL ENCOUNTER (OUTPATIENT)
Dept: CT IMAGING | Facility: HOSPITAL | Age: 60
Discharge: HOME | End: 2025-05-27
Payer: MEDICARE

## 2025-05-27 ENCOUNTER — HOSPITAL ENCOUNTER (OUTPATIENT)
Dept: BONE DENSITY | Facility: HOSPITAL | Age: 60
Discharge: HOME | End: 2025-05-27
Payer: MEDICARE

## 2025-05-27 ENCOUNTER — TELEPHONE (OUTPATIENT)
Dept: FAMILY MEDICINE CLINIC | Age: 60
End: 2025-05-27
Payer: MEDICARE

## 2025-05-27 DIAGNOSIS — Z78.0 ENCOUNTER FOR OSTEOPOROSIS SCREENING IN ASYMPTOMATIC POSTMENOPAUSAL PATIENT: ICD-10-CM

## 2025-05-27 DIAGNOSIS — Z13.820 ENCOUNTER FOR OSTEOPOROSIS SCREENING IN ASYMPTOMATIC POSTMENOPAUSAL PATIENT: ICD-10-CM

## 2025-05-27 DIAGNOSIS — Z82.62 FAMILY HISTORY OF OSTEOPOROSIS IN MOTHER: ICD-10-CM

## 2025-05-27 DIAGNOSIS — E11.42 TYPE 2 DIABETES MELLITUS WITH DIABETIC POLYNEUROPATHY, WITHOUT LONG-TERM CURRENT USE OF INSULIN: ICD-10-CM

## 2025-05-27 DIAGNOSIS — F17.210 NICOTINE DEPENDENCE, CIGARETTES, UNCOMPLICATED: ICD-10-CM

## 2025-05-27 PROCEDURE — 77080 DXA BONE DENSITY AXIAL: CPT

## 2025-05-27 PROCEDURE — 71271 CT THORAX LUNG CANCER SCR C-: CPT

## 2025-05-27 RX ORDER — TIRZEPATIDE 12.5 MG/.5ML
12.5 INJECTION, SOLUTION SUBCUTANEOUS WEEKLY
Qty: 2 ML | Refills: 2 | Status: SHIPPED | OUTPATIENT
Start: 2025-05-27

## 2025-05-27 NOTE — TELEPHONE ENCOUNTER
Happy to lower the dose.  Confirm if she would prefer to go back to 12.5 or possibly to 10 mg.  Thanks.

## 2025-05-27 NOTE — TELEPHONE ENCOUNTER
This has been sent to [facility].  I would recommend not considering an increased dose for a few months.  Please TICKLE to call her again in 12 weeks to see if she would like to stay with 12.5 mg weekly or possibly try 15 mg weekly again.  Thanks.

## 2025-05-27 NOTE — TELEPHONE ENCOUNTER
Caller: Travis Yancey    Relationship: Self    Best call back number:     [phone]       What medication are you requesting:     Tirzepatide     If a prescription is needed, what is your preferred pharmacy and phone number:  [id number] [place] [facility] [address] [facility] [phone]  - [phone]     Additional notes: PATIENT IS NEEDING PRESCRIPTION DOSING LOWERED BACK TO 12.5 MG DUE TO CAUSING EXTREME NAUSEA.

## 2025-05-28 ENCOUNTER — TELEPHONE (OUTPATIENT)
Dept: FAMILY MEDICINE CLINIC | Age: 60
End: 2025-05-28
Payer: MEDICARE

## 2025-05-28 NOTE — TELEPHONE ENCOUNTER
Caller: Travis Yancey    Relationship: Self    Best call back number: [phone]     What was the call regarding: PATIENT STATES THE NEW MOUNJARO DOSAGE NEEDS A PRIOR AUTHORIZATION.

## 2025-05-30 ENCOUNTER — TELEPHONE (OUTPATIENT)
Dept: FAMILY MEDICINE CLINIC | Age: 60
End: 2025-05-30
Payer: MEDICARE

## 2025-05-30 ENCOUNTER — RESULTS FOLLOW-UP (OUTPATIENT)
Dept: FAMILY MEDICINE CLINIC | Age: 60
End: 2025-05-30
Payer: MEDICARE

## 2025-05-30 DIAGNOSIS — R91.1 NODULE OF UPPER LOBE OF RIGHT LUNG: Primary | ICD-10-CM

## 2025-05-30 NOTE — TELEPHONE ENCOUNTER
Caller: Travis Yancey    Relationship: Self    Best call back number: [phone]     What was the call regarding: PATIENT STATES SHE WOULD LIKE FOR US TO REQUEST THE ACTUAL IMAGES FROM HER CT OF LUNGS AT [facility] ON 2/29/24 AND HER MAMMOGRAM AT [facility] ON 3/7/24. PATIENT STATES SHE WOULD LIKE FOR US TO HAVE THE ACTUAL IMAGES AVAILABLE AND NOT JUST THE REPORT SO THAT THE RADIOLOGIST CAN COMPARE THE IMAGES WITH THE ONES TAKEN 5/25. PATIENT STATES SHE IS BEING REFERRED FOR TROUBLING FINDINGS ON HER MOST RECENT CT AND MAMMOGRAM SO SHE WOULD LIKE FOR LAST YEARS IMAGES TO BE AVAILABLE TO COMPARE TO.

## 2025-05-30 NOTE — TELEPHONE ENCOUNTER
Caller: Travis Yancey    Relationship to patient: Self    Best call back number: [phone]    Patient is needing: Patient called in and is wanting a call back regarding the name, location and phone number of the lung nodule clinic that she is being referred to. Patient said it is okay to leave message on phone.

## 2025-05-30 NOTE — TELEPHONE ENCOUNTER
CoverMyMeds Key: [id number]  Message from plan -  The patient currently has access to the requested medication and a Prior Authorization is not needed for the patient/medication.    Per Cameron at [facility] - refill too soon until 6/3/25.    Called pt, she states she has enough until then.

## 2025-06-04 ENCOUNTER — TELEPHONE (OUTPATIENT)
Dept: FAMILY MEDICINE CLINIC | Age: 60
End: 2025-06-04
Payer: MEDICARE

## 2025-06-04 DIAGNOSIS — R11.0 NAUSEA: Primary | ICD-10-CM

## 2025-06-04 RX ORDER — ONDANSETRON 4 MG/1
8 TABLET, FILM COATED ORAL EVERY 8 HOURS PRN
Qty: 100 TABLET | Refills: 1 | Status: SHIPPED | OUTPATIENT
Start: 2025-06-04

## 2025-06-04 RX ORDER — ONDANSETRON 4 MG/1
TABLET, FILM COATED ORAL
Qty: 30 TABLET | Refills: 2 | OUTPATIENT
Start: 2025-06-04

## 2025-06-04 NOTE — TELEPHONE ENCOUNTER
Pt states she is taking the zofran as prescribed- usually 2 q 8hrs. With the qty we are sending in it only lasts about 6 days. She states she has had nausea with Mounjaro since starting and that is what she is needing to tolerate. Would like qty increased. Please advise.

## 2025-06-04 NOTE — TELEPHONE ENCOUNTER
Pt inf, she states she is doing better at the 12.5mg dose and she will stay here and see how things progress.

## 2025-06-04 NOTE — TELEPHONE ENCOUNTER
Noted.  I have sent a larger quantity of the medication to [facility], but this degree of nausea would not be expected with Mounjaro.  I do not expect for her to take generic Zofran on a scheduled or regular basis.  I would recommend continuing to lower the dose of Mounjaro until we find a dose which does not impact her so significantly with nausea.  Does she want me to send a lower dose now?  It would also be an option to stop the Mounjaro until her nausea resolves and then titrate upward again slowly.  See what she would like to do.  Thanks.

## 2025-06-05 LAB
Lab: NEGATIVE
Lab: NORMAL

## 2025-06-19 NOTE — PROGRESS NOTES
Primary Care Provider  Jeferson Harmon MD   Referring Provider  No ref. provider found    Patient Complaint  Establish Care, abnormal CT, Lung Nodule, COPD, and Sleep Apnea    Patient or patient representative verbalized consent for the use of Ambient Listening during the visit with  JUNO Velázquez for chart documentation. 6/20/2025  13:06 EDT      Subjective       History of Presenting Illness  Travis Yancey is a pleasant 59 y.o. female  who presents to [facility] here for new patient appointment.  Patient was referred by her PCP for lung nodule of the upper right lobe.  Patient had low-dose lung cancer screening 5/27/2025.  Findings revealed nodular and irregular densities in the right upper lobe suspected to be postinfectious/inflammatory in etiology. A follow-up low-dose chest CT in 3 months is recommended to reevaluate.     History of Present Illness  The patient is a 59-year-old female who presents for follow-up on an abnormal CT scan as a new patient.    She was referred by her primary care physician following an abnormal finding on a low-dose lung cancer screening CT scan conducted at [facility] in 2024. She reports no current symptoms of fever or chills and overall feels healthy. She has a history of smoking, having quit in 2023 after nearly 40 years of consuming approximately one pack per day. She does not have any pets at home.    She experiences shortness of breath when climbing stairs to her apartment. She uses albuterol as needed and previously used Symbicort, which she has since discontinued. She underwent a 28-panel genetic drug cancer screening test. She has a history of COPD, diagnosed in 2020, but was informed by Dr. López in [place] that she no longer has this condition.    She has a history of sleep apnea, diagnosed in 2021, but has not used her CPAP machine since 2022 due to incarceration and subsequent treatment. She  reports frequent awakenings at night but no episodes of gasping for air.    She has a history of diastolic congestive heart failure, diagnosed in 2020, but reports no current issues. She continues to take metoprolol for blood pressure management.    She has a history of prediabetes, diagnosed in 2020, but reports no current issues.    She has a history of hypertension, diagnosed in 2020, but reports no current issues. She continues to take metoprolol for blood pressure management.    She has a history of hair loss and started taking spironolactone about 3 months ago. She reports that she is not losing any more hair. She thinks a lot of it might be due to rapid weight loss. She is also taking vitamins.    She has a right artificial hip that has been in place for 11 years and causes her discomfort during treadmill use. She has lost 90 pounds over the past year due to the use of Mounjaro. She attributes her weight gain to a period of incarceration and subsequent treatment for drug and alcohol addiction, during which she reached a peak weight of 263 pounds.    SOCIAL HISTORY  Exercise: Uses a treadmill but experiences difficulty due to a right artificial hip and gets out of breath.  Tobacco: Former smoker, quit in 2023 after smoking for almost 40 years at about a pack a day.  Recreational Drugs: Went to treatment for drug recovery.     At present time patient denies dyspnea, coughing, wheezing, headaches, chest pain, weight loss or hemoptysis. Patient denies fevers, chills and night sweats. Travis Yancey is able to perform ADLs.      I have personally reviewed the review of systems, past family, social, medical and surgical histories; and agree with their findings.      Review of Systems   Constitutional: Negative.    HENT: Negative.     Respiratory: Negative.     Cardiovascular: Negative.    Musculoskeletal: Negative.    Neurological: Negative.    Psychiatric/Behavioral: Negative.           Family History   Problem  Relation Age of Onset    Uterine cancer Mother     Colon cancer Mother     Osteoporosis Mother     Arthritis Mother             Cancer Mother         Colon and uterine    COPD Mother     Depression Mother     Drug abuse Mother     Early death Mother     Hyperlipidemia Mother     Mental illness Mother     Anxiety disorder Mother             Vision loss Mother     Sleep apnea Mother             Sleep disorder Mother     Hypertension Father     Depression Father     Hyperlipidemia Father     Anxiety disorder Father     Obesity Father     Sleep apnea Father     Sleep disorder Father     Chronic bronchitis Father     Skin cancer Father     Alcohol abuse Maternal Grandmother             Diabetes Paternal Grandfather          from diabetes    Alcohol abuse Paternal Grandfather     Cancer Paternal Grandmother         Lung    Coronary artery disease Paternal Uncle     Heart disease Paternal Uncle     Hypertension Paternal Uncle     Heart attack Paternal Uncle     Heart failure Paternal Uncle     Hypertension Maternal Aunt     Obesity Maternal Aunt     Breast cancer Maternal Aunt     Sleep apnea Maternal Aunt     Sleep disorder Maternal Aunt     Cancer Maternal Aunt         Breast    Sleep apnea Maternal Aunt     Sleep disorder Maternal Aunt     Cancer Maternal Aunt         Uterine    Asthma Brother     Birth defects Brother     Depression Brother     Anxiety disorder Brother     COPD Brother     Cancer Paternal Aunt         Brain    Brain cancer Paternal Aunt     Alcohol abuse Son     Asthma Son     Depression Son          by suicide    Drug abuse Son     Early death Son     Learning disabilities Son     Mental illness Son     Anxiety disorder Son     Breast cancer Maternal Aunt     Breast cancer Maternal Aunt     Cancer Maternal Aunt     Heart failure Paternal Uncle     Heart attack Maternal Uncle     Heart failure Maternal Uncle     Hypertension Maternal Uncle     Malig Hyperthermia  "Neg Hx         Social History     Socioeconomic History    Marital status:    Tobacco Use    Smoking status: Former     Current packs/day: 0.00     Average packs/day: 1 pack/day for 40.0 years (40.0 ttl pk-yrs)     Types: Cigarettes, Pipe     Start date: 1983     Quit date:      Years since quittin.4    Smokeless tobacco: Never    Tobacco comments:     Intermitt. starting age 22, 3 packs a wk   Vaping Use    Vaping status: Never Used   Substance and Sexual Activity    Alcohol use: Not Currently    Drug use: Yes     Types: Amphetamines, Benzodiazepines, \"Crack\" cocaine, Cocaine(coke), LSD, MDMA (ecstacy), Methamphetamines, Oxycodone, Psilocybin     Comment: In recovery    Sexual activity: Not Currently     Partners: Male     Birth control/protection: Post-menopausal, Tubal ligation     Comment: Menopause        Past Medical History:   Diagnosis Date    Abnormal Pap smear of cervix     ADHD (attention deficit hyperactivity disorder)     Alcoholism     Sober 2 years    Allergic Since childhood    Allergic rhinitis     Anemia     Anxiety     Arthritis     Asthma, intrinsic     Breast cancer     No    Cervical dysplasia     CHF (congestive heart failure) Diastolic CHF    Cholesterol blood reduced     Chronic bronchitis     Colon polyp     Congenital hip dysplasia     COPD (chronic obstructive pulmonary disease) 2020    Coronary artery disease 2020    Depression     Diastolic dysfunction 2020    Dyastolic CHF    Fibromyalgia     Fibromyalgia, primary 10 yrs ago    GERD (gastroesophageal reflux disease) Long time    Herpes     History of transfusion     After placenta previa    HPV (human papilloma virus) infection     Hypertension     Irritable bowel syndrome ???    Low back pain 2005    Lung nodule     Mixed hyperlipidemia     Non-insulin dependent type 2 diabetes mellitus     Obesity     Osteoarthritis     Placenta previa     Polycystic ovary syndrome 1995    Rheumatoid " arthritis     Seasonal allergies     Sleep apnea, obstructive     Trauma     Type 2 diabetes mellitus     Varicella     Visual impairment Recently        Immunization History   Administered Date(s) Administered    COVID-19 (ALEX) 06/15/2021    COVID-19 (PFIZER) 12YRS+ (COMIRNATY) 10/01/2024    COVID-19 (PFIZER) Purple Cap Monovalent 12/16/2021    Flu Vaccine Split Quad 10/06/2021    Fluzone  >6mos 10/22/2024    INFLUENZA A MONOVALENT (H5N1), ADJUVANTED-2013 10/03/2023    Influenza, Unspecified 11/18/2020    Pneumococcal Conjugate 20-Valent (PCV20) 04/18/2025    Pneumococcal Polysaccharide (PPSV23) 10/26/2021    Shingrix 10/06/2021, 04/18/2025    Tdap 04/18/2025    flucelvax quad pfs =>4 YRS 11/18/2020       Allergies   Allergen Reactions    Erythromycin GI Intolerance          Current Outpatient Medications:     albuterol sulfate  (90 Base) MCG/ACT inhaler, Inhale 2 puffs Every 4 (Four) Hours As Needed for Wheezing., Disp: 18 g, Rfl: 5    buPROPion XL (WELLBUTRIN XL) 300 MG 24 hr tablet, Take 1 tablet by mouth Daily., Disp: 90 tablet, Rfl: 3    carvedilol (Coreg) 3.125 MG tablet, Take 1 tablet by mouth 2 (Two) Times a Day With Meals., Disp: 60 tablet, Rfl: 1    furosemide (LASIX) 40 MG tablet, Take 1 tablet by mouth Daily As Needed (swelling)., Disp: , Rfl:     ibuprofen (ADVIL,MOTRIN) 800 MG tablet, Take 1 tablet by mouth Every 8 (Eight) Hours As Needed for Moderate Pain. for pain, Disp: 100 tablet, Rfl: 1    Lancets (OneTouch Delica Plus [id number]) misc, , Disp: , Rfl:     levocetirizine (XYZAL) 5 MG tablet, Take 1 tablet by mouth Daily., Disp: 90 tablet, Rfl: 3    lidocaine (LIDODERM) 5 %, Place 1 patch on the skin as directed by provider Daily. Remove & Discard patch within 12 hours or as directed by MD, Disp: , Rfl:     metFORMIN ER (GLUCOPHAGE-XR) 500 MG 24 hr tablet, Take 2 tablets by mouth Daily With Breakfast., Disp: 180 tablet, Rfl: 3    ondansetron (ZOFRAN) 4 MG tablet, Take 2 tablets by mouth  "Every 8 (Eight) Hours As Needed for Nausea or Vomiting., Disp: 100 tablet, Rfl: 1    OneTouch Verio test strip, , Disp: , Rfl:     pantoprazole (PROTONIX) 40 MG EC tablet, Take 1 tablet by mouth Daily., Disp: 90 tablet, Rfl: 3    potassium chloride (MICRO-K) 10 MEQ CR capsule, Take 1 capsule by mouth Daily As Needed (when taking furosemide)., Disp: , Rfl:     pravastatin (PRAVACHOL) 40 MG tablet, Take 1 tablet by mouth Daily., Disp: 90 tablet, Rfl: 3    rOPINIRole (REQUIP) 0.25 MG tablet, Take 1 tablet by mouth every night at bedtime. (Patient taking differently: Take 1 tablet by mouth Daily As Needed. \"I dont take it every night\"), Disp: 90 tablet, Rfl: 3    spironolactone (ALDACTONE) 50 MG tablet, Take 1 tablet by mouth Daily., Disp: , Rfl:     Tirzepatide (Mounjaro) 12.5 MG/0.5ML solution auto-injector, Inject 0.5 mL under the skin into the appropriate area as directed 1 (One) Time Per Week., Disp: 2 mL, Rfl: 2    valACYclovir (VALTREX) 500 MG tablet, Take 1 tablet by mouth 2 (Two) Times a Day. (Patient taking differently: Take 1 tablet by mouth As Needed.), Disp: 6 tablet, Rfl: 2         Vital Signs   /63 (BP Location: Right arm, Patient Position: Sitting, Cuff Size: Adult)   Pulse 98   Temp 98 °F (36.7 °C)   Resp 16   Ht 165.1 cm (65\")   Wt 83.5 kg (184 lb)   SpO2 100%   BMI 30.62 kg/m²       Objective     Physical Exam  Vitals reviewed.   Constitutional:       General: She is not in acute distress.     Appearance: Normal appearance. She is not ill-appearing.   HENT:      Head: Normocephalic and atraumatic.      Nose: Nose normal.      Mouth/Throat:      Mouth: Mucous membranes are moist.      Pharynx: Oropharynx is clear.   Eyes:      Extraocular Movements: Extraocular movements intact.      Conjunctiva/sclera: Conjunctivae normal.      Pupils: Pupils are equal, round, and reactive to light.   Cardiovascular:      Rate and Rhythm: Normal rate and regular rhythm.      Pulses: Normal pulses.      " Heart sounds: Normal heart sounds.   Pulmonary:      Effort: Pulmonary effort is normal. No respiratory distress.      Breath sounds: Normal breath sounds. No stridor. No wheezing, rhonchi or rales.   Abdominal:      General: Bowel sounds are normal.   Musculoskeletal:         General: Normal range of motion.      Cervical back: Normal range of motion and neck supple.   Skin:     General: Skin is warm and dry.   Neurological:      Mental Status: She is alert and oriented to person, place, and time.   Psychiatric:         Behavior: Behavior normal.         Physical Exam  Blood Pressure: 123/63  Heart: Regular rate and rhythm, no murmurs noted.  Lungs: Clear to auscultation bilaterally.         Results Review  I have personally reviewed the prior office notes, hospital records, labs, and diagnostics.  CT Chest Low Dose Cancer Screening WO [[id number]] (Order [id number])  Order  Status: Edited Result - FINAL     Appointment Information    Display Notes    MO- MW                  PACS Images     Radiology Images      Addendum    ADDENDUM #1  ADDENDUM: The patient's outside chest CT from [facility] dated 2/29/2024 is made available for comparison. The nodular and irregular densities noted in the right upper lobe appear similar to the previous exam. Finding is consistent with lung   RADS category 2. A follow-up low-dose chest CT in 12 months is recommended to reevaluate.     Electronically Signed: Phil Perdomo MD    6/2/2025 4:25 PM EDT    Workstation ID: [id number]  ORIGINAL REPORT:  CT CHEST LOW DOSE CANCER SCREENING WO     Date of Exam: 5/27/2025 3:00 PM EDT     Indication: Lung cancer screening.     Comparison: None available.     Technique: Low dose CT imaging of the chest was performed without intravenous contrast enhancement.  Automated exposure control and iterative reconstruction methods were used.        Findings:  No pleural or pericardial effusion is seen. Heart size is normal. Mild coronary artery  atherosclerotic calcification is noted. Calcified hilar lymph nodes are consistent with previous granulomatous disease. No adenopathy is evident.     In the posterior segment of the right upper lobe on image 27 is a 0.2 cm nodular density. More inferiorly in the right upper lobe is some irregular density which is favored to be postinfectious/inflammatory in etiology. In the right upper lobe on image   41 are 2 adjacent nodular densities measuring 0.6 cm and 0.6 cm. The lungs are otherwise clear.     The visualized upper abdomen appears unremarkable. No fracture or malalignment is seen.     Impression:  Nodular and irregular densities in the right upper lobe suspected to be postinfectious/inflammatory in etiology. A follow-up low-dose chest CT in 3 months is recommended to reevaluate.     Recommendation:  Comparison with prior studies before assignment of Lung-RADS classification, additional imaging needed or 1 3-month follow-up low dose CT (LDCT)     Lung Rads Assessment:  Lung-RADS L0 - Incomplete.            Electronically Signed: Phil Perdomo MD    5/29/2025 1:04 PM EDT    Workstation ID: [id number]   Addended by Phil Perdomo MD on 6/2/2025  4:25 PM     Results  Imaging   - CT low dose lung cancer screening of the right upper lobe: 05/2024, Irregular density suspected to be postinfection or inflammation          Assessment         Patient Active Problem List   Diagnosis    Anemia    Anxiety    Arthritis    Bowel and bladder incontinence    Chronic bronchitis    Chronic low back pain    Bipolar disorder    Depression    Esophageal reflux    Fibromyalgia    Mixed hyperlipidemia    Essential hypertension    Migraine    Neuropathy    Orthostatic hypotension    Chronic hip pain    Type 2 diabetes mellitus with diabetic polyneuropathy    BREE on CPAP    Chronic diastolic (congestive) heart failure    Syncope and collapse    JANE (acute kidney injury)    Obesity hypoventilation syndrome    Cervical spinal stenosis     Other long term (current) drug therapy    Osteoarthritis of hip    Herpes simplex    Dietary counseling    PTSD (post-traumatic stress disorder)    Attention deficit hyperactivity disorder (ADHD)    Obesity, Class III, BMI 40-49.9 (morbid obesity)        Plan     Diagnoses and all orders for this visit:    1. Abnormal CT scan of lung (Primary)  -     Complete PFT - Pre & Post Bronchodilator; Future  -     6 Minute Walk Test; Future  -     Alpha - 1 - Antitrypsin Phenotype  -     CT Chest Without Contrast; Future  -     albuterol sulfate  (90 Base) MCG/ACT inhaler; Inhale 2 puffs Every 4 (Four) Hours As Needed for Wheezing.  Dispense: 18 g; Refill: 5    2. Nodular radiologic density  -     Complete PFT - Pre & Post Bronchodilator; Future  -     6 Minute Walk Test; Future  -     Alpha - 1 - Antitrypsin Phenotype  -     CT Chest Without Contrast; Future  -     albuterol sulfate  (90 Base) MCG/ACT inhaler; Inhale 2 puffs Every 4 (Four) Hours As Needed for Wheezing.  Dispense: 18 g; Refill: 5         Assessment & Plan  1. Abnormal CT scan.  The CT scan from 05/2025 showed an irregular density in the right upper lobe, suspected to be post-infection or inflammation. A follow-up low-dose CT scan will be ordered for 09/2025. A lung function test will also be conducted to assess for any potential restriction or obstruction. An alpha-1 antitrypsin test will be performed to rule out a genetic predisposition to lung disease. A prescription refill for albuterol will be sent to the pharmacy.    2. Chronic Obstructive Pulmonary Disease (COPD).  Despite previous diagnoses, recent lung function tests by Dr. López indicated no current COPD. The patient will continue using albuterol as needed. The lung function test will help confirm the current status of COPD.    3. Hypertension.  The patient's blood pressure is well-controlled at 123/63 mmHg. She will continue taking metoprolol.    4. Diastolic Congestive Heart  Failure.  The patient reports getting winded while climbing stairs, which may be related to her history of diastolic heart failure and long-term smoking. Continued monitoring of symptoms is advised.    5. Sleep Apnea.  The patient has not used her CPAP machine since 2022 due to weight loss and other circumstances. A reassessment of her sleep apnea status will be considered based on the results of the lung function test.    6. Hair Loss.  The patient started taking spironolactone three months ago for hair loss and reports no further hair loss. Continued use of spironolactone is recommended.    Follow-up  The patient will follow up in 3 months.      Smoking status:  reports that she quit smoking about 2 years ago. Her smoking use included cigarettes and pipe. She started smoking about 42 years ago. She has a 40 pack-year smoking history. She has never used smokeless tobacco.    Vaccination status: Reviewed  Immunization History   Administered Date(s) Administered    COVID-19 (ALEX) 06/15/2021    COVID-19 (PFIZER) 12YRS+ (COMIRNATY) 10/01/2024    COVID-19 (PFIZER) Purple Cap Monovalent 12/16/2021    Flu Vaccine Split Quad 10/06/2021    Fluzone  >6mos 10/22/2024    INFLUENZA A MONOVALENT (H5N1), ADJUVANTED-2013 10/03/2023    Influenza, Unspecified 11/18/2020    Pneumococcal Conjugate 20-Valent (PCV20) 04/18/2025    Pneumococcal Polysaccharide (PPSV23) 10/26/2021    Shingrix 10/06/2021, 04/18/2025    Tdap 04/18/2025    flucelvax quad pfs =>4 YRS 11/18/2020        Medications personally reviewed    Follow Up  Return in about 3 months (around 9/20/2025) for after PFT.    Patient was given instructions and counseling regarding her condition or for health maintenance advice. Please see specific information pulled into the AVS if appropriate.     I spent 20 minutes caring for Travis Yancey on this date of service. This time includes time spent by me in the following activities:preparing for the visit, reviewing tests,  obtaining and/or reviewing a separately obtained history, performing a medically appropriate examination and/or evaluation, counseling and educating the patient/family/caregiver, ordering medications, tests, or procedures, documenting information in the medical record, independently interpreting results and communicating that information with the patient/family/caregiver and answered questions family members, discuss medications.

## 2025-06-20 ENCOUNTER — OFFICE VISIT (OUTPATIENT)
Dept: PULMONOLOGY | Facility: CLINIC | Age: 60
End: 2025-06-20
Payer: MEDICARE

## 2025-06-20 ENCOUNTER — LAB (OUTPATIENT)
Facility: HOSPITAL | Age: 60
End: 2025-06-20
Payer: MEDICARE

## 2025-06-20 VITALS
HEART RATE: 98 BPM | TEMPERATURE: 98 F | BODY MASS INDEX: 30.66 KG/M2 | OXYGEN SATURATION: 100 % | DIASTOLIC BLOOD PRESSURE: 63 MMHG | HEIGHT: 65 IN | RESPIRATION RATE: 16 BRPM | SYSTOLIC BLOOD PRESSURE: 123 MMHG | WEIGHT: 184 LBS

## 2025-06-20 DIAGNOSIS — R93.89 NODULAR RADIOLOGIC DENSITY: ICD-10-CM

## 2025-06-20 DIAGNOSIS — R91.8 ABNORMAL CT SCAN OF LUNG: Primary | ICD-10-CM

## 2025-06-20 PROCEDURE — 36415 COLL VENOUS BLD VENIPUNCTURE: CPT | Performed by: NURSE PRACTITIONER

## 2025-06-20 PROCEDURE — 82103 ALPHA-1-ANTITRYPSIN TOTAL: CPT | Performed by: NURSE PRACTITIONER

## 2025-06-20 PROCEDURE — 82104 ALPHA-1-ANTITRYPSIN PHENO: CPT | Performed by: NURSE PRACTITIONER

## 2025-06-20 RX ORDER — ALBUTEROL SULFATE 90 UG/1
2 INHALANT RESPIRATORY (INHALATION) EVERY 4 HOURS PRN
Qty: 18 G | Refills: 5 | Status: SHIPPED | OUTPATIENT
Start: 2025-06-20

## 2025-06-24 LAB
A1AT PHENOTYP SERPL IFE: NORMAL
A1AT SERPL-MCNC: 159 MG/DL (ref 101–187)

## 2025-07-14 ENCOUNTER — OFFICE VISIT (OUTPATIENT)
Dept: SURGERY | Facility: CLINIC | Age: 60
End: 2025-07-14
Payer: MEDICARE

## 2025-07-14 VITALS
HEART RATE: 91 BPM | HEIGHT: 65 IN | OXYGEN SATURATION: 99 % | BODY MASS INDEX: 29.27 KG/M2 | WEIGHT: 175.7 LBS | SYSTOLIC BLOOD PRESSURE: 106 MMHG | DIASTOLIC BLOOD PRESSURE: 72 MMHG | RESPIRATION RATE: 16 BRPM

## 2025-07-14 DIAGNOSIS — Z80.3 FAMILY HISTORY OF BREAST CANCER: Primary | ICD-10-CM

## 2025-07-14 NOTE — PROGRESS NOTES
[facility]     Referring Provider: MICHELLE Garcia*     Chief complaint: management of breast cancer risk     Subjective    HPI: Ms. Travis Yancey is a 58 yo woman, seen at the request of SANCHEZ Garcia, for evaluation for high risk for breast cancer secondary to family history of breast cancer.      She denies any breast pain, skin changes, or nipple discharge. Has complaints of right breast lump that her OBGYN felt on her annual exam, patient did not notice it, this was evaluated in May with diagnostic imaging and was found to have benign lumps. She does not perform self breast exams. She has had 90lb weight loss with Mounjaro.   She denies any prior history of abnormal mammograms or breast biopsies.  She has a family history of breast cancer in her two maternal aunts (estimated dx age 60 and 63).  She is alone in the office today.       Review of Systems   Constitutional: Negative.  Negative for chills.        Weight loss -90lbs in 1 year with manjauro   HENT:   Positive for nosebleeds.    Eyes:  Positive for eye problems.   Respiratory: Negative.     Cardiovascular: Negative.    Gastrointestinal:  Positive for diarrhea and nausea (due to medications).   Endocrine: Positive for hot flashes.   Genitourinary:  Positive for bladder incontinence.    Musculoskeletal:  Positive for arthralgias and back pain.   Skin: Negative.    Neurological: Negative.    Hematological:  Bruises/bleeds easily.   Psychiatric/Behavioral:  Positive for sleep disturbance.        Medications:    Current Outpatient Medications:     albuterol sulfate  (90 Base) MCG/ACT inhaler, Inhale 2 puffs Every 4 (Four) Hours As Needed for Wheezing., Disp: 18 g, Rfl: 5    buPROPion XL (WELLBUTRIN XL) 300 MG 24 hr tablet, Take 1 tablet by mouth Daily., Disp: 90 tablet, Rfl: 3    carvedilol (Coreg) 3.125 MG tablet, Take 1 tablet by mouth 2 (Two) Times a Day With Meals., Disp: 60 tablet, Rfl: 1    furosemide (LASIX) 40 MG  "tablet, Take 1 tablet by mouth Daily As Needed (swelling)., Disp: , Rfl:     ibuprofen (ADVIL,MOTRIN) 800 MG tablet, Take 1 tablet by mouth Every 8 (Eight) Hours As Needed for Moderate Pain. for pain, Disp: 100 tablet, Rfl: 1    Lancets (OneTouch Delica Plus [id number]) misc, , Disp: , Rfl:     levocetirizine (XYZAL) 5 MG tablet, Take 1 tablet by mouth Daily., Disp: 90 tablet, Rfl: 3    lidocaine (LIDODERM) 5 %, Place 1 patch on the skin as directed by provider Daily. Remove & Discard patch within 12 hours or as directed by MD, Disp: , Rfl:     metFORMIN ER (GLUCOPHAGE-XR) 500 MG 24 hr tablet, Take 2 tablets by mouth Daily With Breakfast., Disp: 180 tablet, Rfl: 3    ondansetron (ZOFRAN) 4 MG tablet, Take 2 tablets by mouth Every 8 (Eight) Hours As Needed for Nausea or Vomiting., Disp: 100 tablet, Rfl: 1    OneTouch Verio test strip, , Disp: , Rfl:     pantoprazole (PROTONIX) 40 MG EC tablet, Take 1 tablet by mouth Daily., Disp: 90 tablet, Rfl: 3    potassium chloride (MICRO-K) 10 MEQ CR capsule, Take 1 capsule by mouth Daily As Needed (when taking furosemide)., Disp: , Rfl:     pravastatin (PRAVACHOL) 40 MG tablet, Take 1 tablet by mouth Daily., Disp: 90 tablet, Rfl: 3    rOPINIRole (REQUIP) 0.25 MG tablet, Take 1 tablet by mouth every night at bedtime. (Patient taking differently: Take 1 tablet by mouth Daily As Needed. \"I dont take it every night\"), Disp: 90 tablet, Rfl: 3    spironolactone (ALDACTONE) 50 MG tablet, Take 1 tablet by mouth Daily., Disp: , Rfl:     Tirzepatide (Mounjaro) 12.5 MG/0.5ML solution auto-injector, Inject 0.5 mL under the skin into the appropriate area as directed 1 (One) Time Per Week., Disp: 2 mL, Rfl: 2    valACYclovir (VALTREX) 500 MG tablet, Take 1 tablet by mouth 2 (Two) Times a Day. (Patient taking differently: Take 1 tablet by mouth As Needed.), Disp: 6 tablet, Rfl: 2    Allergies:  Allergies   Allergen Reactions    Erythromycin GI Intolerance       Medical history:  Past Medical " History:   Diagnosis Date    Abnormal Pap smear of cervix 1999    ADHD (attention deficit hyperactivity disorder)     Alcoholism     Sober 2 years    Allergic Since childhood    Allergic rhinitis     Anemia     Anxiety     Arthritis     Asthma, intrinsic     Breast cancer     No    Cervical dysplasia     CHF (congestive heart failure) Diastolic CHF    Cholesterol blood reduced     Chronic bronchitis     Colon polyp     Congenital hip dysplasia     COPD (chronic obstructive pulmonary disease) 2020    Coronary artery disease 2020    Depression     Diastolic dysfunction 2020    Dyastolic CHF    Fibromyalgia     Fibromyalgia, primary 10 yrs ago    GERD (gastroesophageal reflux disease) Long time    Herpes     History of transfusion 1986    After placenta previa    HPV (human papilloma virus) infection 1995    Hypertension     Irritable bowel syndrome ???    Low back pain 2005    Lung nodule     Mixed hyperlipidemia     Non-insulin dependent type 2 diabetes mellitus 2020    Obesity 1990    Osteoarthritis     Placenta previa 1986    Polycystic ovary syndrome 1995    Rheumatoid arthritis     Seasonal allergies     Sleep apnea, obstructive     Trauma     Type 2 diabetes mellitus     Varicella     Visual impairment Recently       Surgical History:  Past Surgical History:   Procedure Laterality Date    APPENDECTOMY      CARDIAC CATHETERIZATION Left 10/13/2021    Procedure: RIGHT AND LEFT HEART CATH;  Surgeon: Jennifer Wilcox MD;  Location: [facility] CATH INVASIVE LOCATION;  Service: Cardiology;  Laterality: Left;  right and left cardiac catheterization    CARDIAC CATHETERIZATION N/A 10/13/2021    Procedure: Left ventriculography;  Surgeon: Jennifer Wilcox MD;  Location:  [facility] CATH INVASIVE LOCATION;  Service: Cardiology;  Laterality: N/A;    CARDIAC CATHETERIZATION N/A 10/13/2021    Procedure: Coronary angiography;  Surgeon: Jennifer Wilcox MD;  Location: [facility] CATH INVASIVE LOCATION;  Service: Cardiology;  Laterality: N/A;     CERVICAL BIOPSY  W/ LOOP ELECTRODE EXCISION      CERVICAL FUSION      c5-6     SECTION       SECTION WITH TUBAL      CHOLECYSTECTOMY      COLONOSCOPY      DISCECTOMY POSTERIOR THORACIC TRANSPEDICULAR APPROACH      ENDOMETRIAL ABLATION      ENDOSCOPY N/A 2022    Procedure: ESOPHAGOGASTRODUODENOSCOPY WITH BIOPSY;  Surgeon: Jaxon Belle Jr., MD;  Location: [facility] ENDOSCOPY;  Service: General;  Laterality: N/A;  PRE-GERD  POST- GASTRITIS    JOINT REPLACEMENT      Total right hip    LAPAROSCOPIC CHOLECYSTECTOMY      OVARIAN CYST SURGERY      PARTIAL HIP ARTHROPLASTY Right     PELVIC LAPAROSCOPY      SPINE SURGERY   and     Lumbar Disectomy L4/L5  and cervical fusion C5/6     TONSILLECTOMY  Age 9    TUBAL ABDOMINAL LIGATION  1992    WISDOM TOOTH EXTRACTION         Family History:  Family History   Problem Relation Age of Onset    Uterine cancer Mother     Colon cancer Mother     Osteoporosis Mother     Arthritis Mother             Cancer Mother         Colon and uterine    COPD Mother     Depression Mother     Drug abuse Mother     Early death Mother     Hyperlipidemia Mother     Mental illness Mother     Anxiety disorder Mother             Vision loss Mother     Sleep apnea Mother             Sleep disorder Mother     Hypertension Father     Depression Father     Hyperlipidemia Father     Anxiety disorder Father     Obesity Father     Sleep apnea Father     Sleep disorder Father     Chronic bronchitis Father     Skin cancer Father     Alcohol abuse Maternal Grandmother             Diabetes Paternal Grandfather          from diabetes    Alcohol abuse Paternal Grandfather     Cancer Paternal Grandmother         Lung    Coronary artery disease Paternal Uncle     Heart disease Paternal Uncle     Hypertension Paternal Uncle     Heart attack Paternal Uncle     Heart failure Paternal Uncle     Hypertension Maternal Aunt     Obesity  "Maternal Aunt     Breast cancer Maternal Aunt     Sleep apnea Maternal Aunt     Sleep disorder Maternal Aunt     Cancer Maternal Aunt         Breast    Sleep apnea Maternal Aunt     Sleep disorder Maternal Aunt     Cancer Maternal Aunt         Uterine    Asthma Brother     Birth defects Brother     Depression Brother     Anxiety disorder Brother     COPD Brother     Cancer Paternal Aunt         Brain    Brain cancer Paternal Aunt     Alcohol abuse Son     Asthma Son     Depression Son          by suicide    Drug abuse Son     Early death Son     Learning disabilities Son     Mental illness Son     Anxiety disorder Son     Breast cancer Maternal Aunt     Breast cancer Maternal Aunt     Cancer Maternal Aunt     Heart failure Paternal Uncle     Heart attack Maternal Uncle     Heart failure Maternal Uncle     Hypertension Maternal Uncle     Malig Hyperthermia Neg Hx        Family cancer history-relationship (age at diagnosis/age at death or current):  Breast: two maternal aunts (dx 60 and 63, estimated age, still living)  Colon: mother (dx 66, )   Lung:N/A  Ovarian: N/A  Pancreatic: N/A   Prostate: N/A  Skin: melanoma-father (dx 79, still living)   Uterine: Maternal aunt (dx 20s, still living)   Brain: paternal aunt (glioblastoma, dx 50s, )     Social History:   Social History     Socioeconomic History    Marital status:     Number of children: 2   Tobacco Use    Smoking status: Former     Current packs/day: 0.00     Average packs/day: 1 pack/day for 40.0 years (40.0 ttl pk-yrs)     Types: Cigarettes, Pipe     Start date: 1983     Quit date:      Years since quittin.5    Smokeless tobacco: Never    Tobacco comments:     Intermitt. starting age 22, 3 packs a wk   Vaping Use    Vaping status: Never Used   Substance and Sexual Activity    Alcohol use: Not Currently    Drug use: Yes     Types: Amphetamines, Benzodiazepines, \"Crack\" cocaine, Cocaine(coke), LSD, MDMA (ecstacy), " Methamphetamines, Oxycodone, Psilocybin     Comment: In recovery    Sexual activity: Not Currently     Partners: Male     Birth control/protection: Post-menopausal, Tubal ligation     Comment: Menopause     Patient drinks 1-2 servings of caffeine per week.  Her occupation is certified    She lives with self       GYNECOLOGIC HISTORY:   . P: 2. AB: 0.  Last menstrual period: unknown  Age at menarche: 13  Age at first childbirth: 21  Lactation/How long: few weeks  Age at menopause: 45-50  Total years of oral contraceptive use: 25-30  Total years of hormone replacement therapy: 0    Objective     Vitals:    25 1304   BP: 106/72   Pulse: 91   Resp: 16   SpO2: 99%     ECOG 0 - Asymptomatic  General: NAD, well appearing  Psych: a&o x 3, normal mood and affect  Lymph nodes: no cervical, supraclavicular or axillary lymphadenopathy  Breast: symmetric, medium sized, grade III ptosis, bra size 40D  Right: No visible abnormalities on inspection while seated, with arms raised or hands on hips. No masses, skin changes, or nipple abnormalities.  Left: No visible abnormalities on inspection while seated, with arms raised or hands on hips. No masses, skin changes, or nipple abnormalities.    Imaging/Pathology:   3/7/2024 bilateral screening mammogram at [facility]  There are scattered areas of fibroglandular density.  Benign calcifications.  Scattered areas of focal asymmetry are noted.  No new suspicious mass, suspicious calcifications or architectural distortion is present.  Impression: No mammographic evidence of malignancy.  BI-RADS 2    2025 bilateral diagnostic mammogram and right Limited breast ultrasound at [facility]  FINDINGS:  Mammogram: No developing mass, suspicious cluster of calcifications, or architectural distortion is identified.  ULTRASOUND: Along the right breast at the 2 o'clock position 6 cm from the nipple is a  small benign lipoma measuring 7 x 4 mm. Along the 1 o'clock position 4 cm from the nipple are 2 similar appearing smaller lipomas measuring 4 x 2 mm and 2 x 1 mm. At the 1 o'clock position 3 cm from the nipple is a benign rim-calcified cyst measuring 4 x 4 mm, and at the 3 o'clock position 6 cm from the nipple is a benign calcification measuring 3 mm. No concerning features were identified.  IMPRESSION: No evidence of malignancy. Recommend resume annual screening mammography.  TISSUE DENSITY:  There are scattered areas of fibroglandular density.   BI-RADS ASSESSMENT: BI-RADS 2    5/21/2025 [facility] genetic testing-40 gene panel  VUS NF1    Assessment & Plan   Assessment:    Lifetime risk of breast cancer-according to TC 8 she has a lifetime risk of 10.8%, 10 year risk of 3.8% and a 5 year ansley model risk of 1% (calculated 7/14/2025)  Family history of breast cancer        Discussion:  Patient was made aware that she was not high risk for breast cancer, no further preventative imaging is warranted at this time. Encouraged her to continue with self breast exam and yearly mammograms.     Plan:  -Patient will follow up PRN if she has any new breast complaints or concerns.   -Continue with yearly mammograms with her OBGYN.     I have advised the patient to continue monthly breast self examination and advised to notify us if she develops new breast symptoms.      JUNO Rubio    I spent 20 total minutes, face-to-face, chart review and caring for Travis today. This time includes time spent by me in the following activities: preparing for the visit, performing a medically appropriate examination and/or evaluation, counseling and educating the patient/family/caregiver, ordering medications, tests, or procedures, documenting information in the medical record and independently interpreting results and communicating that information with the patient/family/caregiver.    CC:  MICHELLE Garcia*  Jeferson Harmon  MD YUE Sanchez/transcription disclaimer:  Dictated using Dragon dictation

## 2025-07-14 NOTE — LETTER
July 14, 2025     Jeferson Harmon MD  [address]  [address]  [facility] [address]    Patient: Travis Yancey   YOB: 1965   Date of Visit: 7/14/2025     Dear Jeferson Harmon MD:       Thank you for referring Travis Yancey to me for evaluation. Below are the relevant portions of my assessment and plan of care.    If you have questions, please do not hesitate to call me. I look forward to following Travis along with you.         Sincerely,        JUNO Rubio        CC: JUNO Garcia Tracey, APRN  07/14/25 1606  Sign when Signing Visit  [facility]     Referring Provider: SANCHEZ Garcia     Chief complaint: management of breast cancer risk     Subjective   HPI: Ms. Travis Yancey is a 60 yo woman, seen at the request of MICHELLE Garcia*, for evaluation for high risk for breast cancer secondary to family history of breast cancer.      She denies any breast pain, skin changes, or nipple discharge. Has complaints of right breast lump that her OBGYN felt on her annual exam, patient did not notice it, this was evaluated in May with diagnostic imaging and was found to have benign lumps. She does not perform self breast exams. She has had 90lb weight loss with Mounjaro.   She denies any prior history of abnormal mammograms or breast biopsies.  She has a family history of breast cancer in her two maternal aunts (estimated dx age 60 and 63).  She is alone in the office today.       Review of Systems   Constitutional: Negative.  Negative for chills.        Weight loss -90lbs in 1 year with manjauro   HENT:   Positive for nosebleeds.    Eyes:  Positive for eye problems.   Respiratory: Negative.     Cardiovascular: Negative.    Gastrointestinal:  Positive for diarrhea and nausea (due to medications).   Endocrine: Positive for hot flashes.   Genitourinary:  Positive for bladder incontinence.    Musculoskeletal:  Positive for  arthralgias and back pain.   Skin: Negative.    Neurological: Negative.    Hematological:  Bruises/bleeds easily.   Psychiatric/Behavioral:  Positive for sleep disturbance.        Medications:    Current Outpatient Medications:   •  albuterol sulfate  (90 Base) MCG/ACT inhaler, Inhale 2 puffs Every 4 (Four) Hours As Needed for Wheezing., Disp: 18 g, Rfl: 5  •  buPROPion XL (WELLBUTRIN XL) 300 MG 24 hr tablet, Take 1 tablet by mouth Daily., Disp: 90 tablet, Rfl: 3  •  carvedilol (Coreg) 3.125 MG tablet, Take 1 tablet by mouth 2 (Two) Times a Day With Meals., Disp: 60 tablet, Rfl: 1  •  furosemide (LASIX) 40 MG tablet, Take 1 tablet by mouth Daily As Needed (swelling)., Disp: , Rfl:   •  ibuprofen (ADVIL,MOTRIN) 800 MG tablet, Take 1 tablet by mouth Every 8 (Eight) Hours As Needed for Moderate Pain. for pain, Disp: 100 tablet, Rfl: 1  •  Lancets (OneTouch Delica Plus [id number]) misc, , Disp: , Rfl:   •  levocetirizine (XYZAL) 5 MG tablet, Take 1 tablet by mouth Daily., Disp: 90 tablet, Rfl: 3  •  lidocaine (LIDODERM) 5 %, Place 1 patch on the skin as directed by provider Daily. Remove & Discard patch within 12 hours or as directed by MD, Disp: , Rfl:   •  metFORMIN ER (GLUCOPHAGE-XR) 500 MG 24 hr tablet, Take 2 tablets by mouth Daily With Breakfast., Disp: 180 tablet, Rfl: 3  •  ondansetron (ZOFRAN) 4 MG tablet, Take 2 tablets by mouth Every 8 (Eight) Hours As Needed for Nausea or Vomiting., Disp: 100 tablet, Rfl: 1  •  OneTouch Verio test strip, , Disp: , Rfl:   •  pantoprazole (PROTONIX) 40 MG EC tablet, Take 1 tablet by mouth Daily., Disp: 90 tablet, Rfl: 3  •  potassium chloride (MICRO-K) 10 MEQ CR capsule, Take 1 capsule by mouth Daily As Needed (when taking furosemide)., Disp: , Rfl:   •  pravastatin (PRAVACHOL) 40 MG tablet, Take 1 tablet by mouth Daily., Disp: 90 tablet, Rfl: 3  •  rOPINIRole (REQUIP) 0.25 MG tablet, Take 1 tablet by mouth every night at bedtime. (Patient taking differently: Take 1  "tablet by mouth Daily As Needed. \"I dont take it every night\"), Disp: 90 tablet, Rfl: 3  •  spironolactone (ALDACTONE) 50 MG tablet, Take 1 tablet by mouth Daily., Disp: , Rfl:   •  Tirzepatide (Mounjaro) 12.5 MG/0.5ML solution auto-injector, Inject 0.5 mL under the skin into the appropriate area as directed 1 (One) Time Per Week., Disp: 2 mL, Rfl: 2  •  valACYclovir (VALTREX) 500 MG tablet, Take 1 tablet by mouth 2 (Two) Times a Day. (Patient taking differently: Take 1 tablet by mouth As Needed.), Disp: 6 tablet, Rfl: 2    Allergies:  Allergies   Allergen Reactions   • Erythromycin GI Intolerance       Medical history:  Past Medical History:   Diagnosis Date   • Abnormal Pap smear of cervix 1999   • ADHD (attention deficit hyperactivity disorder)    • Alcoholism     Sober 2 years   • Allergic Since childhood   • Allergic rhinitis    • Anemia    • Anxiety    • Arthritis    • Asthma, intrinsic    • Breast cancer     No   • Cervical dysplasia    • CHF (congestive heart failure) Diastolic CHF   • Cholesterol blood reduced    • Chronic bronchitis    • Colon polyp    • Congenital hip dysplasia    • COPD (chronic obstructive pulmonary disease) 2020   • Coronary artery disease 2020   • Depression    • Diastolic dysfunction 2020    Dyastolic CHF   • Fibromyalgia    • Fibromyalgia, primary 10 yrs ago   • GERD (gastroesophageal reflux disease) Long time   • Herpes    • History of transfusion 1986    After placenta previa   • HPV (human papilloma virus) infection 1995   • Hypertension    • Irritable bowel syndrome ???   • Low back pain 2005   • Lung nodule    • Mixed hyperlipidemia    • Non-insulin dependent type 2 diabetes mellitus 2020   • Obesity 1990   • Osteoarthritis    • Placenta previa 1986   • Polycystic ovary syndrome 1995   • Rheumatoid arthritis    • Seasonal allergies    • Sleep apnea, obstructive    • Trauma    • Type 2 diabetes mellitus    • Varicella    • Visual impairment Recently       Surgical History:  Past " Surgical History:   Procedure Laterality Date   • APPENDECTOMY     • CARDIAC CATHETERIZATION Left 10/13/2021    Procedure: RIGHT AND LEFT HEART CATH;  Surgeon: Jennifer Wilcox MD;  Location:  [facility];  Service: Cardiology;  Laterality: Left;  right and left cardiac catheterization   • CARDIAC CATHETERIZATION N/A 10/13/2021    Procedure: Left ventriculography;  Surgeon: Jennifer Wilcox MD;  Location:  [facility];  Service: Cardiology;  Laterality: N/A;   • CARDIAC CATHETERIZATION N/A 10/13/2021    Procedure: Coronary angiography;  Surgeon: Jennifer Wilcox MD;  Location:  [facility];  Service: Cardiology;  Laterality: N/A;   • CERVICAL BIOPSY  W/ LOOP ELECTRODE EXCISION     • CERVICAL FUSION      c5-6   •  SECTION     •  SECTION WITH TUBAL     • CHOLECYSTECTOMY     • COLONOSCOPY     • DISCECTOMY POSTERIOR THORACIC TRANSPEDICULAR APPROACH     • ENDOMETRIAL ABLATION     • ENDOSCOPY N/A 2022    Procedure: ESOPHAGOGASTRODUODENOSCOPY WITH BIOPSY;  Surgeon: Jaxon Belle Jr., MD;  Location: [facility] ENDOSCOPY;  Service: General;  Laterality: N/A;  PRE-GERD  POST- GASTRITIS   • JOINT REPLACEMENT      Total right hip   • LAPAROSCOPIC CHOLECYSTECTOMY     • OVARIAN CYST SURGERY     • PARTIAL HIP ARTHROPLASTY Right    • PELVIC LAPAROSCOPY     • SPINE SURGERY   and     Lumbar Disectomy L4/L5  and cervical fusion C5/6    • TONSILLECTOMY  Age 9   • TUBAL ABDOMINAL LIGATION     • WISDOM TOOTH EXTRACTION         Family History:  Family History   Problem Relation Age of Onset   • Uterine cancer Mother    • Colon cancer Mother    • Osteoporosis Mother    • Arthritis Mother            • Cancer Mother         Colon and uterine   • COPD Mother    • Depression Mother    • Drug abuse Mother    • Early death Mother    • Hyperlipidemia Mother    • Mental illness Mother    • Anxiety disorder Mother            •  Vision loss Mother    • Sleep apnea Mother            • Sleep disorder Mother    • Hypertension Father    • Depression Father    • Hyperlipidemia Father    • Anxiety disorder Father    • Obesity Father    • Sleep apnea Father    • Sleep disorder Father    • Chronic bronchitis Father    • Skin cancer Father    • Alcohol abuse Maternal Grandmother            • Diabetes Paternal Grandfather          from diabetes   • Alcohol abuse Paternal Grandfather    • Cancer Paternal Grandmother         Lung   • Coronary artery disease Paternal Uncle    • Heart disease Paternal Uncle    • Hypertension Paternal Uncle    • Heart attack Paternal Uncle    • Heart failure Paternal Uncle    • Hypertension Maternal Aunt    • Obesity Maternal Aunt    • Breast cancer Maternal Aunt    • Sleep apnea Maternal Aunt    • Sleep disorder Maternal Aunt    • Cancer Maternal Aunt         Breast   • Sleep apnea Maternal Aunt    • Sleep disorder Maternal Aunt    • Cancer Maternal Aunt         Uterine   • Asthma Brother    • Birth defects Brother    • Depression Brother    • Anxiety disorder Brother    • COPD Brother    • Cancer Paternal Aunt         Brain   • Brain cancer Paternal Aunt    • Alcohol abuse Son    • Asthma Son    • Depression Son          by suicide   • Drug abuse Son    • Early death Son    • Learning disabilities Son    • Mental illness Son    • Anxiety disorder Son    • Breast cancer Maternal Aunt    • Breast cancer Maternal Aunt    • Cancer Maternal Aunt    • Heart failure Paternal Uncle    • Heart attack Maternal Uncle    • Heart failure Maternal Uncle    • Hypertension Maternal Uncle    • Malig Hyperthermia Neg Hx        Family cancer history-relationship (age at diagnosis/age at death or current):  Breast: two maternal aunts (dx 60 and 63, estimated age, still living)  Colon: mother (dx 66, )   Lung:N/A  Ovarian: N/A  Pancreatic: N/A   Prostate: N/A  Skin: melanoma-father (dx 79, still living)  "  Uterine: Maternal aunt (dx 20s, still living)   Brain: paternal aunt (glioblastoma, dx 50s, )     Social History:   Social History     Socioeconomic History   • Marital status:    • Number of children: 2   Tobacco Use   • Smoking status: Former     Current packs/day: 0.00     Average packs/day: 1 pack/day for 40.0 years (40.0 ttl pk-yrs)     Types: Cigarettes, Pipe     Start date: 1983     Quit date:      Years since quittin.5   • Smokeless tobacco: Never   • Tobacco comments:     Intermitt. starting age 22, 3 packs a wk   Vaping Use   • Vaping status: Never Used   Substance and Sexual Activity   • Alcohol use: Not Currently   • Drug use: Yes     Types: Amphetamines, Benzodiazepines, \"Crack\" cocaine, Cocaine(coke), LSD, MDMA (ecstacy), Methamphetamines, Oxycodone, Psilocybin     Comment: In recovery   • Sexual activity: Not Currently     Partners: Male     Birth control/protection: Post-menopausal, Tubal ligation     Comment: Menopause     Patient drinks 1-2 servings of caffeine per week.  Her occupation is certified    She lives with self       GYNECOLOGIC HISTORY:   . P: 2. AB: 0.  Last menstrual period: unknown  Age at menarche: 13  Age at first childbirth: 21  Lactation/How long: few weeks  Age at menopause: 45-50  Total years of oral contraceptive use: 25-30  Total years of hormone replacement therapy: 0    Objective    Vitals:    25 1304   BP: 106/72   Pulse: 91   Resp: 16   SpO2: 99%     ECOG 0 - Asymptomatic  General: NAD, well appearing  Psych: a&o x 3, normal mood and affect  Lymph nodes: no cervical, supraclavicular or axillary lymphadenopathy  Breast: symmetric, medium sized, grade III ptosis, bra size 40D  Right: No visible abnormalities on inspection while seated, with arms raised or hands on hips. No masses, skin changes, or nipple abnormalities.  Left: No visible abnormalities on inspection while seated, with arms raised or hands on hips. " No masses, skin changes, or nipple abnormalities.    Imaging/Pathology:   3/7/2024 bilateral screening mammogram at [facility]  There are scattered areas of fibroglandular density.  Benign calcifications.  Scattered areas of focal asymmetry are noted.  No new suspicious mass, suspicious calcifications or architectural distortion is present.  Impression: No mammographic evidence of malignancy.  BI-RADS 2    5/19/2025 bilateral diagnostic mammogram and right Limited breast ultrasound at [facility]  FINDINGS:  Mammogram: No developing mass, suspicious cluster of calcifications, or architectural distortion is identified.  ULTRASOUND: Along the right breast at the 2 o'clock position 6 cm from the nipple is a small benign lipoma measuring 7 x 4 mm. Along the 1 o'clock position 4 cm from the nipple are 2 similar appearing smaller lipomas measuring 4 x 2 mm and 2 x 1 mm. At the 1 o'clock position 3 cm from the nipple is a benign rim-calcified cyst measuring 4 x 4 mm, and at the 3 o'clock position 6 cm from the nipple is a benign calcification measuring 3 mm. No concerning features were identified.  IMPRESSION: No evidence of malignancy. Recommend resume annual screening mammography.  TISSUE DENSITY:  There are scattered areas of fibroglandular density.   BI-RADS ASSESSMENT: BI-RADS 2    5/21/2025 [facility] genetic testing-40 gene panel  VUS NF1    Assessment & Plan  Assessment:    Lifetime risk of breast cancer-according to TC 8 she has a lifetime risk of 10.8%, 10 year risk of 3.8% and a 5 year ansley model risk of 1%  Family history of breast cancer        Discussion:  Patient was made aware that she was not high risk for breast cancer, no further preventative imaging is warranted at this time. Encouraged her to continue with self breast exam and yearly mammograms.     Plan:  -Patient will follow up PRN if she has any new breast complaints or concerns.   -Continue  with yearly mammograms with her OBGYN.     I have advised the patient to continue monthly breast self examination and advised to notify us if she develops new breast symptoms.      JUNO Rubio    I spent 20 total minutes, face-to-face, chart review and caring for Travis gallo. This time includes time spent by me in the following activities: preparing for the visit, performing a medically appropriate examination and/or evaluation, counseling and educating the patient/family/caregiver, ordering medications, tests, or procedures, documenting information in the medical record and independently interpreting results and communicating that information with the patient/family/caregiver.    CC:  Kya Spaulding AP*  Jeferson Harmon MD    EMR Dragon/transcription disclaimer:  Dictated using Dragon dictation

## 2025-07-17 DIAGNOSIS — I50.32 CHRONIC DIASTOLIC (CONGESTIVE) HEART FAILURE: ICD-10-CM

## 2025-07-17 DIAGNOSIS — I10 ESSENTIAL HYPERTENSION: ICD-10-CM

## 2025-07-17 RX ORDER — CARVEDILOL 3.12 MG/1
3.12 TABLET ORAL 2 TIMES DAILY WITH MEALS
Qty: 180 TABLET | Refills: 3 | Status: SHIPPED | OUTPATIENT
Start: 2025-07-17

## 2025-07-25 ENCOUNTER — TRANSCRIBE ORDERS (OUTPATIENT)
Dept: ADMINISTRATIVE | Facility: HOSPITAL | Age: 60
End: 2025-07-25
Payer: MEDICARE

## 2025-07-25 ENCOUNTER — LAB (OUTPATIENT)
Dept: LAB | Facility: HOSPITAL | Age: 60
End: 2025-07-25
Payer: MEDICARE

## 2025-07-25 DIAGNOSIS — L65.8 OTHER SPECIFIED NONSCARRING HAIR LOSS: Primary | ICD-10-CM

## 2025-07-25 DIAGNOSIS — L65.8 OTHER SPECIFIED NONSCARRING HAIR LOSS: ICD-10-CM

## 2025-07-25 LAB
ALBUMIN SERPL-MCNC: 4.4 G/DL (ref 3.5–5.2)
ALBUMIN/GLOB SERPL: 1.4 G/DL
ALP SERPL-CCNC: 73 U/L (ref 39–117)
ALT SERPL W P-5'-P-CCNC: 20 U/L (ref 1–33)
ANION GAP SERPL CALCULATED.3IONS-SCNC: 13 MMOL/L (ref 5–15)
AST SERPL-CCNC: 24 U/L (ref 1–32)
BILIRUB SERPL-MCNC: 0.3 MG/DL (ref 0–1.2)
BUN SERPL-MCNC: 19 MG/DL (ref 6–20)
BUN/CREAT SERPL: 14 (ref 7–25)
CALCIUM SPEC-SCNC: 9.7 MG/DL (ref 8.6–10.5)
CHLORIDE SERPL-SCNC: 103 MMOL/L (ref 98–107)
CO2 SERPL-SCNC: 22 MMOL/L (ref 22–29)
CREAT SERPL-MCNC: 1.36 MG/DL (ref 0.57–1)
EGFRCR SERPLBLD CKD-EPI 2021: 45.5 ML/MIN/1.73
GLOBULIN UR ELPH-MCNC: 3.1 GM/DL
GLUCOSE SERPL-MCNC: 80 MG/DL (ref 65–99)
POTASSIUM SERPL-SCNC: 4.7 MMOL/L (ref 3.5–5.2)
PROT SERPL-MCNC: 7.5 G/DL (ref 6–8.5)
SODIUM SERPL-SCNC: 138 MMOL/L (ref 136–145)

## 2025-07-25 PROCEDURE — 36415 COLL VENOUS BLD VENIPUNCTURE: CPT

## 2025-07-25 PROCEDURE — 80053 COMPREHEN METABOLIC PANEL: CPT

## 2025-07-28 ENCOUNTER — TELEPHONE (OUTPATIENT)
Dept: FAMILY MEDICINE CLINIC | Age: 60
End: 2025-07-28
Payer: MEDICARE

## 2025-07-28 NOTE — TELEPHONE ENCOUNTER
Caller: Travis Yancey    Relationship: Self    Best call back number: [phone]     What is the best time to reach you: ANYTIME     Who are you requesting to speak with (clinical staff, provider,  specific staff member): CLINIC     What was the call regarding: PATIENT CALLED IN STATING SHE GOT LABS DONE THAT WERE ORDERED BY HER DERMATOLOGIST- PATIENT IS REQUESTING FOR MD LOWRY TO LOOK AT THOSE BECAUSE THEY ARE ABNORMAL.   PATIENT WOULD LIKE A CALL BACK AS SOON AS POSSIBLE TO DISCUSS THIS AND TO FIND OUT OF THERE IS ANY CONCERNS- PLEASE ADVISE

## 2025-07-28 NOTE — TELEPHONE ENCOUNTER
PATIENT IS CALLING BACK, AND STATED THAT HER DERMATOLOGY WOULD LIKE DISCONTINUE THE spironolactone (ALDACTONE) 50 MG tablet , UNTIL PCP CLEARS FOR PATIENT TO CONTINUE TAKING FOLLOWING RECENT LABS. PATIENT WOULD LIKE TO ALSO SPEAK WITH YADIEL.

## 2025-08-03 NOTE — TELEPHONE ENCOUNTER
It is okay with me to simply stop the spironolactone.  Continue other medications at current doses.  It is possible that her blood pressure may go up with the change or that she may have swelling of the legs.  Please TICKLE for BMP, weight, and blood pressure check in 2 weeks for hypertension.  Thanks.

## 2025-08-04 NOTE — TELEPHONE ENCOUNTER
Pt inf, she states after further discussion with dermatologist, they are ok with leaving her on the spironolactone due to pt not being able to get cream they were going to prescribe in its place being too expensive. They are wanting to know if you are ok with leaving pt on spironolactone after reviewing her recent labs. Please advise.

## 2025-08-05 DIAGNOSIS — R11.0 NAUSEA: ICD-10-CM

## 2025-08-05 RX ORDER — ONDANSETRON 4 MG/1
8 TABLET, FILM COATED ORAL EVERY 8 HOURS PRN
Qty: 100 TABLET | Refills: 1 | Status: SHIPPED | OUTPATIENT
Start: 2025-08-05

## 2025-08-06 ENCOUNTER — TELEPHONE (OUTPATIENT)
Dept: FAMILY MEDICINE CLINIC | Age: 60
End: 2025-08-06
Payer: MEDICARE

## 2025-08-07 ENCOUNTER — TELEPHONE (OUTPATIENT)
Dept: FAMILY MEDICINE CLINIC | Age: 60
End: 2025-08-07
Payer: MEDICARE

## 2025-08-15 ENCOUNTER — TELEPHONE (OUTPATIENT)
Dept: FAMILY MEDICINE CLINIC | Age: 60
End: 2025-08-15
Payer: MEDICARE

## 2025-08-15 DIAGNOSIS — Z79.899 OTHER LONG TERM (CURRENT) DRUG THERAPY: ICD-10-CM

## 2025-08-15 DIAGNOSIS — I10 ESSENTIAL HYPERTENSION: ICD-10-CM

## 2025-08-15 DIAGNOSIS — E78.2 MIXED HYPERLIPIDEMIA: ICD-10-CM

## 2025-08-15 DIAGNOSIS — E11.42 TYPE 2 DIABETES MELLITUS WITH DIABETIC POLYNEUROPATHY, WITHOUT LONG-TERM CURRENT USE OF INSULIN: Primary | ICD-10-CM

## (undated) DEVICE — FRCP BX RADJAW4 NDL 2.8 240CM LG OG BX40

## (undated) DEVICE — GLIDESHEATH SLENDER STAINLESS STEEL KIT: Brand: GLIDESHEATH SLENDER

## (undated) DEVICE — Device

## (undated) DEVICE — CATH VENT MIV RADL PIG ST TIP 5F 110CM

## (undated) DEVICE — MSK PROC CURAPLEX O2 2/ADAPT 7FT

## (undated) DEVICE — CATH DIAG IMPULSE FL4 5F 100CM

## (undated) DEVICE — SENSR O2 OXIMAX FNGR A/ 18IN NONSTR

## (undated) DEVICE — BITEBLOCK OMNI BLOC

## (undated) DEVICE — PK CATH CARD 40

## (undated) DEVICE — GW EMR FIX EXCHG J STD .035 3MM 260CM

## (undated) DEVICE — LN SMPL CO2 SHTRM SD STREAM W/M LUER

## (undated) DEVICE — RADIFOCUS GLIDEWIRE: Brand: GLIDEWIRE

## (undated) DEVICE — KT MANIFLD CARDIAC

## (undated) DEVICE — BALN PRESS WEDGE 5F 110CM

## (undated) DEVICE — TUBING, SUCTION, 1/4" X 10', STRAIGHT: Brand: MEDLINE

## (undated) DEVICE — ADAPT CLN BIOGUARD AIR/H2O DISP

## (undated) DEVICE — HI-TORQUE BALANCE MIDDLEWEIGHT GUIDE WIRE .014 STRAIGHT TIP 3.0 CM X 190 CM: Brand: HI-TORQUE BALANCE MIDDLEWEIGHT

## (undated) DEVICE — CATH DIAG IMPULSE FL3.5 5F 100CM

## (undated) DEVICE — SYS PANNUS RETENT LF

## (undated) DEVICE — KT ORCA ORCAPOD DISP STRL

## (undated) DEVICE — GLIDESHEATH BASIC HYDROPHILIC COATED INTRODUCER SHEATH: Brand: GLIDESHEATH

## (undated) DEVICE — CATH DIAG IMPULSE FR4 5F 100CM

## (undated) DEVICE — INTRO SHEATH ART/FEM ENGAGE .035 5F12CM